# Patient Record
Sex: FEMALE | Race: BLACK OR AFRICAN AMERICAN
[De-identification: names, ages, dates, MRNs, and addresses within clinical notes are randomized per-mention and may not be internally consistent; named-entity substitution may affect disease eponyms.]

---

## 2018-03-28 ENCOUNTER — HOSPITAL ENCOUNTER (EMERGENCY)
Dept: HOSPITAL 74 - JER | Age: 58
Discharge: HOME | End: 2018-03-28
Payer: COMMERCIAL

## 2018-03-28 VITALS — TEMPERATURE: 97.5 F | HEART RATE: 76 BPM | SYSTOLIC BLOOD PRESSURE: 125 MMHG | DIASTOLIC BLOOD PRESSURE: 93 MMHG

## 2018-03-28 VITALS — BODY MASS INDEX: 19.5 KG/M2

## 2018-03-28 DIAGNOSIS — N61.1: Primary | ICD-10-CM

## 2018-03-28 DIAGNOSIS — Z86.14: ICD-10-CM

## 2018-03-28 DIAGNOSIS — G40.909: ICD-10-CM

## 2018-03-28 DIAGNOSIS — G80.9: ICD-10-CM

## 2018-03-28 PROCEDURE — 0H9UXZZ: ICD-10-PCS

## 2018-03-28 NOTE — PDOC
Rapid Medical Evaluation


Medical Evaluation: 


 Allergies











Allergy/AdvReac Type Severity Reaction Status Date / Time


 


phenytoin sodium AdvReac Mild  Verified 07/24/14 18:46





[From Dilantin]     


 


phenytoin sodium extended AdvReac Mild  Verified 07/24/14 18:46





[From Dilantin]     











03/28/18 20:06





I have performed a brief in-person evaluation of this patient.





The patient presents with a chief complaint of: L axilla abscess, currently on 

abx per aide, sent for Robert for I&D. H/o cerebral palsy, profound MR





Pertinent physical exam findings:Stable, exam deferred to ED provider





I have ordered the following:nothing





The patient will proceed to the ED for further evaluation.











**Discharge Disposition





- Diagnosis


 Abscess








- Referrals





- Patient Instructions





- Post Discharge Activity

## 2018-03-28 NOTE — PDOC
History of Present Illness





- General


Chief Complaint: Wound


Stated Complaint: ABSCESS


Time Seen by Provider: 03/28/18 20:10


History Source: Patient





- History of Present Illness


Initial Comments: 





03/28/18 20:57


57 year old female from [facility] history of MRCP, seizure 

disorder, spastic quaraplegic, history of MRSA with left breast abscess 

currently on Clindamycin PO. denies fever/ chills. patient send in for I& D of 

abscess. 





Past History





- Past Medical History


Allergies/Adverse Reactions: 


 Allergies











Allergy/AdvReac Type Severity Reaction Status Date / Time


 


phenytoin sodium AdvReac Mild  Verified 03/28/18 20:11





[From Dilantin]     


 


phenytoin sodium extended AdvReac Mild  Verified 03/28/18 20:11





[From Dilantin]     











Home Medications: 


Ambulatory Orders





Acetaminophen [Tylenol .Regular Strength -] 650 mg PO Q6H 07/24/14 


Albuterol 2.5/Ipratropium 0.5 [Duoneb -] 1 neb NEB Q4H PRN 07/24/14 


Bisacodyl [Dulcolax] 5 mg PO DAILY 07/24/14 


Calcium Carbonate/Vitamin D3 [Oyster Shell Calcium + D Tab] 1 each PO BID 07/24/ 14 


Carbamazepine Xr [Tegretol Xr -] 200 mg PO HS 07/24/14 


Carbamazepine Xr [Tegretol Xr -] 400 mg PO ACHS 07/24/14 


Chlorhexidine Gluconate [Peridex -] 12 ea PO BID 07/24/14 


Docusate Liquid [Colace Liquid -] 20 mg PO BID 07/24/14 


Guaifenesin [Mucinex -] 600 mg PO BID PRN 07/24/14 


Ibuprofen [Motrin -] 400 mg PO TID PRN 07/24/14 


Loratadine 10 mg PO DAILY 07/24/14 


Methylcellulose (with Sugar) [Citrucel Powder] 850 gm PO DAILY 07/24/14 


Pnv/Iron,Carb/Om-3/FA/Fat 1 [Multivitamin with Minerals Cap] 1 each PO DAILY 07/ 24/14 


Polyethylene Glycol 3350 [Miralax 119 gm Btl -] 17 gm PO DAILY 07/24/14 


Ammonium Lactate Lotion [Lac-Hydrin 12% Lotion -] 1 applic TP BID 07/09/16 


Docusate Liquid [Colace Liquid -] 100 mg PO BID 07/09/16 


Simethicone Liquid [Mylicon] 40 mg PO QID PRN 07/09/16 


Sodium Phosphate,Mono-Dibasic [Fleet Enema] 133 ml RC PRN PRN 07/09/16 








COPD: No


Seizures: Yes (EPILEPSY)


Other medical history: Cerebral palsy, MR, congenital quadraplegia





- Suicide/Smoking/Psychosocial Hx


Smoking Status: No


Smoking History: Never smoked


Have you smoked in the past 12 months: No


Number of Cigarettes Smoked Daily: 0


Information on smoking cessation initiated: No


Hx Alcohol Use: No


Drug/Substance Use Hx: No


Substance Use Type: None





*Physical Exam





- Vital Signs


 Last Vital Signs











Temp Pulse Resp BP Pulse Ox


 


 97.5 F L  76   20   125/93   99 


 


 03/28/18 20:12  03/28/18 20:12  03/28/18 20:12  03/28/18 20:12  03/28/18 20:12














- Physical Exam


General Appearance: Yes: Appropriately Dressed


Extremity: positive: Swelling, Other (left breast swelling proximal to left 

axilla noted to PUs fill abscess and induration. no surrounding cellulitis 

noted. )


Neurologic: positive: Fully Oriented, Alert, Normal Mood/Affect





Procedures





- Incision and Drainage


I&D Site: Left: Other (breast small incision draining copious pus drainage. )


Betadine cleansed: Yes


Anesthesia: 1% Lidocaine


Blade Size: 11


Attempts: 1





*DC/Admit/Observation/Transfer


Diagnosis at time of Disposition: 


 Abscess








- Referrals


Referrals: 


Vinod Segal Jr [Primary Care Provider] - 


Wyatt Alfaro MD [Staff Physician] - 24 hours





- Patient Instructions


Printed Discharge Instructions:  DI for Wound Infection


Additional Instructions: 


continue clindamycin





apply warm compress to the area. 








Please have her see breast surgeon as soon as possible. 








return to the ER if symptoms worsen, fever./ 





- Post Discharge Activity

## 2019-06-11 ENCOUNTER — HOSPITAL ENCOUNTER (EMERGENCY)
Dept: HOSPITAL 74 - JER | Age: 59
LOS: 1 days | Discharge: HOME | End: 2019-06-12
Payer: COMMERCIAL

## 2019-06-11 VITALS — HEART RATE: 92 BPM | SYSTOLIC BLOOD PRESSURE: 106 MMHG | DIASTOLIC BLOOD PRESSURE: 76 MMHG

## 2019-06-11 VITALS — BODY MASS INDEX: 11.1 KG/M2

## 2019-06-11 VITALS — TEMPERATURE: 99.4 F

## 2019-06-11 DIAGNOSIS — R63.8: ICD-10-CM

## 2019-06-11 DIAGNOSIS — F73: ICD-10-CM

## 2019-06-11 DIAGNOSIS — G40.909: ICD-10-CM

## 2019-06-11 DIAGNOSIS — N39.0: Primary | ICD-10-CM

## 2019-06-11 DIAGNOSIS — G80.0: ICD-10-CM

## 2019-06-11 LAB
ALBUMIN SERPL-MCNC: 3.7 G/DL (ref 3.4–5)
ALP SERPL-CCNC: 127 U/L (ref 45–117)
ALT SERPL-CCNC: 17 U/L (ref 13–61)
ANION GAP SERPL CALC-SCNC: 6 MMOL/L (ref 8–16)
APPEARANCE UR: (no result)
AST SERPL-CCNC: 24 U/L (ref 15–37)
BACTERIA #/AREA URNS HPF: 5145.6 /HPF
BASOPHILS # BLD: 0.3 % (ref 0–2)
BILIRUB SERPL-MCNC: 0.2 MG/DL (ref 0.2–1)
BILIRUB UR STRIP.AUTO-MCNC: NEGATIVE MG/DL
BUN SERPL-MCNC: 14.4 MG/DL (ref 7–18)
CALCIUM SERPL-MCNC: 9.3 MG/DL (ref 8.5–10.1)
CHLORIDE SERPL-SCNC: 109 MMOL/L (ref 98–107)
CO2 SERPL-SCNC: 32 MMOL/L (ref 21–32)
COLOR UR: (no result)
CREAT SERPL-MCNC: 0.4 MG/DL (ref 0.55–1.3)
DEPRECATED RDW RBC AUTO: 12.9 % (ref 11.6–15.6)
EOSINOPHIL # BLD: 0 % (ref 0–4.5)
EPITH CASTS URNS QL MICRO: 42.3 /HPF
GLUCOSE SERPL-MCNC: 95 MG/DL (ref 74–106)
HCT VFR BLD CALC: 47.9 % (ref 32.4–45.2)
HGB BLD-MCNC: 15.3 GM/DL (ref 10.7–15.3)
LEUKOCYTE ESTERASE UR QL STRIP.AUTO: (no result)
LYMPHOCYTES # BLD: 10.3 % (ref 8–40)
MCH RBC QN AUTO: 27.7 PG (ref 25.7–33.7)
MCHC RBC AUTO-ENTMCNC: 31.8 G/DL (ref 32–36)
MCV RBC: 87.2 FL (ref 80–96)
MONOCYTES # BLD AUTO: 9.5 % (ref 3.8–10.2)
NEUTROPHILS # BLD: 79.9 % (ref 42.8–82.8)
PH UR: 6.5 [PH] (ref 5–8)
PLATELET # BLD AUTO: 162 K/MM3 (ref 134–434)
PMV BLD: 10.9 FL (ref 7.5–11.1)
POTASSIUM SERPLBLD-SCNC: 3.6 MMOL/L (ref 3.5–5.1)
PROT SERPL-MCNC: 7.6 G/DL (ref 6.4–8.2)
PROT UR QL STRIP: 100
PROT UR QL STRIP: NEGATIVE
RBC # BLD AUTO: 5.5 M/MM3 (ref 3.6–5.2)
RBC # BLD AUTO: 70.7 /HPF (ref 0–4)
SODIUM SERPL-SCNC: 148 MMOL/L (ref 136–145)
SP GR UR: 1.03 (ref 1.01–1.03)
UROBILINOGEN UR STRIP-MCNC: 1 MG/DL (ref 0.2–1)
WBC # BLD AUTO: 6.7 K/MM3 (ref 4–10)
WBC # UR AUTO: 61.1 /HPF (ref 0–5)
YEAST #/AREA URNS HPF: (no result) /[HPF]

## 2019-06-11 PROCEDURE — 0T9B70Z DRAINAGE OF BLADDER WITH DRAINAGE DEVICE, VIA NATURAL OR ARTIFICIAL OPENING: ICD-10-PCS

## 2019-06-11 PROCEDURE — 0T9B7ZZ DRAINAGE OF BLADDER, VIA NATURAL OR ARTIFICIAL OPENING: ICD-10-PCS

## 2019-06-11 PROCEDURE — 3E02329 INTRODUCTION OF OTHER ANTI-INFECTIVE INTO MUSCLE, PERCUTANEOUS APPROACH: ICD-10-PCS

## 2019-06-11 NOTE — PDOC
Rapid Medical Evaluation


Time Seen by Provider: 06/11/19 16:21


Medical Evaluation: 


 Allergies











Allergy/AdvReac Type Severity Reaction Status Date / Time


 


phenytoin sodium AdvReac Mild  Verified 03/28/18 20:11





[From Dilantin]     


 


phenytoin sodium extended AdvReac Mild  Verified 03/28/18 20:11





[From Dilantin]     











06/11/19 16:21


I have performed a brief in-person evaluation of this patient.





The patient presents with a chief complaint of: poor PO intake x1 week





Pertinent physical exam findings: Normoactive BS. Abd SNTND.





I have ordered the following: labs, urine





The patient will proceed to the ED for further evaluation.





**Discharge Disposition





- Diagnosis


 Decreased oral intake








- Referrals





- Patient Instructions





- Post Discharge Activity

## 2019-06-11 NOTE — PDOC
History of Present Illness





- General


Chief Complaint: Loss of Appetite


Stated Complaint: LOSS OF APPETITE


Time Seen by Provider: 06/11/19 16:21





- History of Present Illness


Initial Comments: 





06/11/19 17:23


58F [facility] services history of MRCP, seizure disorder, spastic 

quaraplegic brought in for decreased PO intake for the past 3 days. 


Typically is fed using straw but seems like there is now poor effort to get 

food through the straw, has to be more "forcibly fed by hand". Sister is at 

bedside said theres is some vaginal discharge associated with foul urine odor. 





06/11/19 20:50








Past History





- Past Medical History


Allergies/Adverse Reactions: 


 Allergies











Allergy/AdvReac Type Severity Reaction Status Date / Time


 


phenytoin sodium AdvReac Mild  Verified 03/28/18 20:11





[From Dilantin]     


 


phenytoin sodium extended AdvReac Mild  Verified 03/28/18 20:11





[From Dilantin]     











Home Medications: 


Ambulatory Orders





Acetaminophen [Tylenol .Regular Strength -] 650 mg PO Q6H PRN 07/24/14 


Albuterol 2.5/Ipratropium 0.5 [Duoneb -] 1 neb NEB Q4H PRN 07/24/14 


Bisacodyl [Dulcolax] 5 mg PO DAILY 07/24/14 


Carbamazepine Xr [Tegretol Xr -] 400 mg PO ACHS 07/24/14 


Chlorhexidine Gluconate [Peridex -] 1 tsp PO BID 07/24/14 


Guaifenesin [Mucinex -] 600 mg PO BID PRN 07/24/14 


Ibuprofen [Motrin -] 400 mg PO TID PRN 07/24/14 


Loratadine 10 mg PO DAILY 07/24/14 


Methylcellulose (with Sugar) [Citrucel Powder] 1 tsp PO DAILY 07/24/14 


Pnv/Iron,Carb/Om-3/FA/Fat 1 [Multivitamin with Minerals Cap] 1 each PO DAILY 07/ 24/14 


Polyethylene Glycol 3350 [Miralax 119 gm Btl -] 17 gm PO DAILY 07/24/14 


Ammonium Lactate Lotion [Lac-Hydrin 12% Lotion -] 1 applic TP BID 07/09/16 


Docusate Liquid [Colace Liquid -] 20 mg PO BID 07/09/16 


Simethicone Liquid [Mylicon] 40 mg PO QID PRN 07/09/16 


Sodium Phosphate,Mono-Dibasic [Fleet Enema] 133 ml RC PRN PRN 07/09/16 


Calcium Carbonate/Vitamin D3 [Oystercal-D 500 mg-400 Unit Tb] 1 each PO BID 06/ 11/19 


Ergocalciferol [Vitamin D2] 50,000 unit PO Q7D@1000 06/11/19 








COPD: No


Seizures: Yes (EPILEPSY,Astigmatism,Scoliosis)


Other medical history: profound intellectual disabilities,cerebral palsy w/

spastic quadriparesis,





- Immunization History


Immunization Up to Date: No





- Suicide/Smoking/Psychosocial Hx


Smoking Status: No


Smoking History: Never smoked


Have you smoked in the past 12 months: No


Number of Cigarettes Smoked Daily: 0


Information on smoking cessation initiated: No


Hx Alcohol Use: No


Drug/Substance Use Hx: No


Substance Use Type: None





**Review of Systems





- Review of Systems


Able to Perform ROS?: No (non-verbal)





*Physical Exam





- Vital Signs


 Last Vital Signs











Temp Pulse Resp BP Pulse Ox


 


 99.4 F   118 H  16   109/87   94 L


 


 06/11/19 16:21  06/11/19 16:21  06/11/19 16:21  06/11/19 16:21  06/11/19 16:21














- Physical Exam


General Appearance: Yes: Other (Patient noon-verbal, contracted.)


HEENT: positive: EOMI, RANDAL, Normal ENT Inspection


Respiratory/Chest: positive: Lungs Clear, Normal Breath Sounds.  negative: 

Chest Tender, Respiratory Distress


Cardiovascular: positive: Regular Rhythm, S1, S2, Tachycardia


Female Pelvic Exam: positive: other (some yeast like discharge )


Gastrointestinal/Abdominal: positive: Normal Bowel Sounds, Flat, Soft.  negative

: Tender


Integumentary: positive: Normal Color, Dry, Warm


Neurologic: positive: Other (At baseline)





ED Treatment Course





- LABORATORY


CBC & Chemistry Diagram: 


 06/11/19 19:00





 06/11/19 19:00





Medical Decision Making





- Medical Decision Making





06/11/19 20:52


Cheko get septic workup, investigate probable UTI., chest xray and basic labs. 





Difficulty getting urine through straight cath due to difficult anatomy. Second 

try successful with olivares. 


Labs pending. 





Conversation with family as obtaining a IV line is difficult. Will get all labs 

then reassess necessity for IV line. 


06/11/19 22:03


Patient signed out to Dr. Carmichael





*DC/Admit/Observation/Transfer


Diagnosis at time of Disposition: 


 Decreased oral intake, UTI (urinary tract infection)








- Discharge Dispostion


Condition at time of disposition: Stable





- Referrals





- Patient Instructions





- Post Discharge Activity

## 2019-06-11 NOTE — PDOC
Documentation entered by Hamida Nielson SCRIBE, acting as scribe for Linh West DO.








Linh West DO:  This documentation has been prepared by the 

Nisreen michaels Brenda, SCRIBE, under my direction and personally reviewed by me 

in its entirety.  I confirm that the documentation accurately reflects all work

, treatment, procedures, and medical decision making performed by me.  





Attending Attestation





- Resident


Resident Name: Rahul De La Paz





- ED Attending Attestation


I have performed the following: I have examined & evaluated the patient, The 

case was reviewed & discussed with the resident, I agree w/resident's findings 

& plan





- HPI


HPI: 





06/11/19 19:32





The patient is a 58 year old female from [facility] via EMS 

with a significant past medical history of MRCP, seizure disorder, spastic 

quadriplegic secondary to cerebral palsy who presents to the Emergency 

Department with 3 days of decreased PO intake and urine abnormalities. As per 

aide, she is usually fed through a straw at baseline, but has been having a 

poor effort getting food through the straw. The aide additionally notes her 

urine has been foul smelling with white purulence passing through when 

urinating. Patient is nonverbal at baseline secondary to CP since childhood. 





Allergies: phenytoin sodium


Past surgical history: Not reported


Social history: Not reported








- Physicial Exam


PE: 





06/11/19 19:33





Agrees with the residents physical exam. 











- Medical Decision Making





06/11/19 23:30


58-year-old female sent for decreased by mouth intake and possible urinary 

tract infection


Catheterized urine is consistent with UTI at


Patient's sister is at the bedside, she acts as her health care surrogate as 

patient has severe cerebral palsy with limited cognitive ability


They prefer the patient goes back to her facility on oral antibiotics, in 

regards to her decreased by mouth intake and swallowing difficulties they would 

like to repeat a swallow study on an outpatient basis


They are aware that forcing feeds will increase chances of aspiration


Rocephin 250 mg given IM, patient will be discharged on Keflex

## 2019-06-12 NOTE — EKG
Test Reason : 

Blood Pressure : ***/*** mmHG

Vent. Rate : 132 BPM     Atrial Rate : 132 BPM

   P-R Int : 124 ms          QRS Dur : 060 ms

    QT Int : 288 ms       P-R-T Axes : 096 074 090 degrees

   QTc Int : 426 ms

 

SINUS TACHYCARDIA

SEPTAL INFARCT , AGE UNDETERMINED

ABNORMAL ECG

NO PREVIOUS ECGS AVAILABLE

Confirmed by MER PERALTA MD ([id number]) on 6/12/2019 3:29:45 PM

 

Referred By:             Confirmed By:MER PERALTA MD

## 2019-07-03 ENCOUNTER — HOSPITAL ENCOUNTER (EMERGENCY)
Dept: HOSPITAL 74 - FER | Age: 59
Discharge: HOME | End: 2019-07-03
Payer: COMMERCIAL

## 2019-07-03 VITALS — DIASTOLIC BLOOD PRESSURE: 93 MMHG | TEMPERATURE: 99 F | SYSTOLIC BLOOD PRESSURE: 138 MMHG | HEART RATE: 82 BPM

## 2019-07-03 VITALS — BODY MASS INDEX: 11.1 KG/M2

## 2019-07-03 DIAGNOSIS — R63.0: Primary | ICD-10-CM

## 2019-07-03 DIAGNOSIS — G40.909: ICD-10-CM

## 2019-07-03 DIAGNOSIS — G80.9: ICD-10-CM

## 2019-07-03 LAB
ALBUMIN SERPL-MCNC: 3.4 G/DL (ref 3.4–5)
ALP SERPL-CCNC: 98 U/L (ref 45–117)
ALT SERPL-CCNC: 9 U/L (ref 13–61)
ANION GAP SERPL CALC-SCNC: 7 MMOL/L (ref 8–16)
AST SERPL-CCNC: 16 U/L (ref 15–37)
BASOPHILS # BLD: 0.3 % (ref 0–2)
BILIRUB SERPL-MCNC: 0.3 MG/DL (ref 0.2–1)
BUN SERPL-MCNC: 14 MG/DL (ref 7–18)
CALCIUM SERPL-MCNC: 9.3 MG/DL (ref 8.5–10)
CHLORIDE SERPL-SCNC: 106 MMOL/L (ref 98–107)
CO2 SERPL-SCNC: 32 MMOL/L (ref 21–32)
CREAT SERPL-MCNC: < 0.6 MG/DL (ref 0.55–1.3)
DEPRECATED RDW RBC AUTO: 11.9 % (ref 11.6–15.6)
EOSINOPHIL # BLD: 0 % (ref 0–4.5)
GLUCOSE SERPL-MCNC: 119 MG/DL (ref 74–106)
HCT VFR BLD CALC: 39 % (ref 32.4–45.2)
HGB BLD-MCNC: 12.4 GM/DL (ref 10.7–15.3)
LYMPHOCYTES # BLD: 27.4 % (ref 8–40)
MCH RBC QN AUTO: 27.5 PG (ref 25.7–33.7)
MCHC RBC AUTO-ENTMCNC: 31.7 G/DL (ref 32–36)
MCV RBC: 86.6 FL (ref 80–96)
MONOCYTES # BLD AUTO: 8.8 % (ref 3.8–10.2)
NEUTROPHILS # BLD: 63.5 % (ref 42.8–82.8)
PLATELET # BLD AUTO: 227 K/MM3 (ref 134–434)
PMV BLD: 9 FL (ref 7.5–11.1)
POTASSIUM SERPLBLD-SCNC: 3.2 MMOL/L (ref 3.5–5.1)
PROT SERPL-MCNC: 6.2 G/DL (ref 6.4–8.2)
RBC # BLD AUTO: 4.51 M/MM3 (ref 3.6–5.2)
SODIUM SERPL-SCNC: 145 MMOL/L (ref 136–145)
WBC # BLD AUTO: 6 K/MM3 (ref 4–10.8)

## 2019-07-03 NOTE — PDOC
Documentation entered by Coleen Adams SCRIBE, acting as scribe for 

Sanaz Gordon MD.








Sanaz Gordon MD:  This documentation has been prepared by the 

scribe, Coleen Adams SCRIBE, under my direction and personally reviewed by me 

in its entirety.  I confirm that the documentation accurately reflects all work

, treatment, procedures, and medical decision making performed by me.  





History of Present Illness





- General


Chief Complaint: Urinary Problem


Stated Complaint: DIFFICULTY URINATING,DISCHARGE


Time Seen by Provider: 07/03/19 20:01


History Source: Care Provider


Exam Limitations: Clinical Condition





- History of Present Illness


Initial Comments: 





07/03/19 20:06


The patient is a 58-year-old from [facility], with a past 

medical history of MR - nonverbal, epilepsy, and scoliosis, who presents to the 

ED for decreased PO intake. Sister and caregiver are at bedside and report that 

the patient has been experiencing decreased appetite today. As a result, the 

patient has had decreased urine output. Urine is dark in color. LBM was 

yesterday. Otherwise, the patient is behaving at her baseline. HPI is limited 

due to the patients clinical condition. 








PAST MEDICAL HISTORY:  MR -nonverbal, epilepsy, scoliosis.





PAST SURGICAL HISTORY:  no significant history





FAMILY HISTORY:  no pertinent history 





HISTORY:  Pt lives at the [facility]. 





MEDICATIONS:  reviewed





ALLERGIES:  As per nursing notes








General:  (+)Loss of appetite. No fevers or chills, no weakness, no weight loss 


HEENT: No change in vision.  No sore throat,. No ear pain


CardioVascular:  No chest pain or shortness of breath


Respiratory:No cough, or wheezing. 


Gastrointestinal:  no nausea, vomiting, diarrhea or constipation,  No rectal 

bleeding


Genitourinary: (+)Decreased urine output. No dysuria, hematuria, or frequency


Musculoskeletal:  No joint or muscle pain or swelling


Neurologic: No headache, vertigo, dizziness or loss of consciousness


Psychiatric: nor depression 


Skin: No rashes or easy bruising


Endocrine: no increased thirst or abnormal weight change


Allergic: no skin or latex allergy


All other systems reviewed and normal








General: (+)Alert but nonverbal, no acute distress. Very small for her age with 

contractures as a result of having spent her life in bed or a wheelchair. 


HEENT: Throat: Normal, tonsils normal, no erythema or exudate


Neck: Supple, no meningeal signs, no lymphadenopathy


Eyes::Pupils equal reactive and round, extraocular motion intact


Chest: Nontender to palpation 


Cardiac: S1-S2 normal, regular rate and rhythm, no murmurs rubs or gallops


Respiratory: Lungs clear to auscultation bilateral


Abdomen: Soft, nondistended, normal bowel sounds, nontender to palpation 

diffusely


Extremities: Warm, dry, no cyanosis, clubbing, or edema


Skin: (+)Large well-healed midline scar that extends from the sternum to the 

pubic bone. No rashes


Neuro: (+)Alert, but nonverbal. 





07/03/19 20:58


On Reevaluation, patient did have 1 wet diaper in the ED. 


07/03/19 21:40


Patient also is drinking fluids in the ED.





Patient's white count was normal and her labs were normal


This 14 creatinine was 0.6. Her potassium was a little bit low at 3.2. 

Otherwise patient is at her baseline and will be discharged back to her group 

home





Past History





- Past Medical History


Allergies/Adverse Reactions: 


 Allergies











Allergy/AdvReac Type Severity Reaction Status Date / Time


 


phenytoin sodium AdvReac Mild  Verified 03/28/18 20:11





[From Dilantin]     


 


phenytoin sodium extended AdvReac Mild  Verified 03/28/18 20:11





[From Dilantin]     











Home Medications: 


Ambulatory Orders





Acetaminophen [Tylenol .Regular Strength -] 650 mg PO Q6H PRN 07/24/14 


Albuterol 2.5/Ipratropium 0.5 [Duoneb -] 1 neb NEB Q4H PRN 07/24/14 


Bisacodyl [Dulcolax] 5 mg PO DAILY 07/24/14 


Carbamazepine Xr [Tegretol Xr -] 400 mg PO ACHS 07/24/14 


Chlorhexidine Gluconate [Peridex -] 1 tsp PO BID 07/24/14 


Guaifenesin [Mucinex -] 600 mg PO BID PRN 07/24/14 


Ibuprofen [Motrin -] 400 mg PO TID PRN 07/24/14 


Loratadine 10 mg PO DAILY 07/24/14 


Methylcellulose (with Sugar) [Citrucel Powder] 1 tsp PO DAILY 07/24/14 


Pnv/Iron,Carb/Om-3/FA/Fat 1 [Multivitamin with Minerals Cap] 1 each PO DAILY 07/ 24/14 


Polyethylene Glycol 3350 [Miralax 119 gm Btl -] 17 gm PO DAILY 07/24/14 


Ammonium Lactate Lotion [Lac-Hydrin 12% Lotion -] 1 applic TP BID 07/09/16 


Docusate Liquid [Colace Liquid -] 20 mg PO BID 07/09/16 


Simethicone Liquid [Mylicon] 40 mg PO QID PRN 07/09/16 


Sodium Phosphate,Mono-Dibasic [Fleet Enema] 133 ml RC PRN PRN 07/09/16 


Calcium Carbonate/Vitamin D3 [Oystercal-D 500 mg-400 Unit Tb] 1 each PO BID 06/ 11/19 


Cephalexin Monohydrate [Keflex -] 500 mg PO Q8H #21 capsule 06/11/19 


Cephalexin Monohydrate [Keflex -] 500 mg PO Q8H 7 Days #21 capsule 06/11/19 


Ergocalciferol [Vitamin D2] 50,000 unit PO Q7D@1000 06/11/19 








COPD: No


Seizures: Yes (EPILEPSY,Astigmatism,Scoliosis)





- Immunization History


Immunization Up to Date: No





- Suicide/Smoking/Psychosocial Hx


Smoking Status: No


Smoking History: Never smoked


Have you smoked in the past 12 months: No


Number of Cigarettes Smoked Daily: 0


Hx Alcohol Use: No


Drug/Substance Use Hx: No


Substance Use Type: None





*Physical Exam





- Vital Signs


 Last Vital Signs











Temp Pulse Resp BP Pulse Ox


 


 99 F   82   18   138/93    


 


 07/03/19 20:05  07/03/19 20:05  07/03/19 20:05  07/03/19 20:05   














ED Treatment Course





- LABORATORY


CBC & Chemistry Diagram: 


 07/03/19 20:50





 07/03/19 20:50





- ADDITIONAL ORDERS


Additional order review: 


 Laboratory  Results











  07/03/19





  20:50


 


Sodium  145


 


Potassium  3.2 L


 


Chloride  106


 


Carbon Dioxide  32


 


Anion Gap  7 L


 


BUN  14.0


 


Creatinine  < 0.6


 


Est GFR (CKD-EPI)AfAm  114.82


 


Est GFR (CKD-EPI)NonAf  99.07


 


Random Glucose  119 H


 


Calcium  9.3


 


Total Bilirubin  0.3


 


AST  16


 


ALT  9 L


 


Alkaline Phosphatase  98


 


Total Protein  6.2 L


 


Albumin  3.4








 











  07/03/19





  20:50


 


RBC  4.51


 


MCV  86.6


 


MCHC  31.7 L


 


RDW  11.9


 


MPV  9.0


 


Neutrophils %  63.5


 


Lymphocytes %  27.4


 


Monocytes %  8.8


 


Eosinophils %  0.0


 


Basophils %  0.3














- RADIOLOGY


Radiology Studies Ordered: 














 Category Date Time Status


 


 CHEST X-RAY PORTABLE* [RAD] Stat Radiology  07/03/19 20:49 Taken














*DC/Admit/Observation/Transfer


Diagnosis at time of Disposition: 


 Decrease in appetite, Cerebral palsy








- Discharge Dispostion


Disposition: HOME


Condition at time of disposition: Stable


Decision to Admit order: No





- Referrals





- Patient Instructions


Additional Instructions: 


Return to the emergency department immediately with ANY new, persistent or 

worsening symptoms.





Continue any medications as previously prescribed by your physician.





You should follow up with your primary doctor as soon as possible regarding 

today's emergency department visit.


.


Please make sure your doctor reviews the results of your emergency evaluation.





Thank you for coming to the   Emergency Department today for your care. It was 

a pleasure to see you today. Please note that your evaluation is INCOMPLETE 

until you  follow-up with your doctor. 





- Post Discharge Activity

## 2019-08-22 ENCOUNTER — HOSPITAL ENCOUNTER (INPATIENT)
Dept: HOSPITAL 74 - FER | Age: 59
LOS: 21 days | DRG: 388 | End: 2019-09-12
Attending: NURSE PRACTITIONER | Admitting: INTERNAL MEDICINE
Payer: COMMERCIAL

## 2019-08-22 VITALS — BODY MASS INDEX: 12.1 KG/M2

## 2019-08-22 DIAGNOSIS — E87.0: ICD-10-CM

## 2019-08-22 DIAGNOSIS — J98.4: ICD-10-CM

## 2019-08-22 DIAGNOSIS — A41.89: ICD-10-CM

## 2019-08-22 DIAGNOSIS — J90: ICD-10-CM

## 2019-08-22 DIAGNOSIS — F73: ICD-10-CM

## 2019-08-22 DIAGNOSIS — E87.1: ICD-10-CM

## 2019-08-22 DIAGNOSIS — G80.0: ICD-10-CM

## 2019-08-22 DIAGNOSIS — G80.8: ICD-10-CM

## 2019-08-22 DIAGNOSIS — N17.9: ICD-10-CM

## 2019-08-22 DIAGNOSIS — D50.9: ICD-10-CM

## 2019-08-22 DIAGNOSIS — M41.9: ICD-10-CM

## 2019-08-22 DIAGNOSIS — B35.6: ICD-10-CM

## 2019-08-22 DIAGNOSIS — R06.03: ICD-10-CM

## 2019-08-22 DIAGNOSIS — R00.0: ICD-10-CM

## 2019-08-22 DIAGNOSIS — K21.9: ICD-10-CM

## 2019-08-22 DIAGNOSIS — I46.9: ICD-10-CM

## 2019-08-22 DIAGNOSIS — J45.909: ICD-10-CM

## 2019-08-22 DIAGNOSIS — E83.39: ICD-10-CM

## 2019-08-22 DIAGNOSIS — G40.909: ICD-10-CM

## 2019-08-22 DIAGNOSIS — E87.6: ICD-10-CM

## 2019-08-22 DIAGNOSIS — K56.609: Primary | ICD-10-CM

## 2019-08-22 DIAGNOSIS — Z74.01: ICD-10-CM

## 2019-08-22 DIAGNOSIS — J98.11: ICD-10-CM

## 2019-08-22 DIAGNOSIS — Z53.09: ICD-10-CM

## 2019-08-22 DIAGNOSIS — R64: ICD-10-CM

## 2019-08-22 DIAGNOSIS — E87.5: ICD-10-CM

## 2019-08-22 LAB
ALBUMIN SERPL-MCNC: 4 G/DL (ref 3.4–5)
ALP SERPL-CCNC: 168 U/L (ref 45–117)
ALT SERPL-CCNC: 16 U/L (ref 13–61)
ANION GAP SERPL CALC-SCNC: 15 MMOL/L (ref 8–16)
AST SERPL-CCNC: 30 U/L (ref 15–37)
BILIRUB SERPL-MCNC: 0.7 MG/DL (ref 0.2–1)
BUN SERPL-MCNC: 15 MG/DL (ref 7–18)
CALCIUM SERPL-MCNC: 10 MG/DL (ref 8.5–10)
CHLORIDE SERPL-SCNC: 96 MMOL/L (ref 98–107)
CO2 SERPL-SCNC: 32 MMOL/L (ref 21–32)
CREAT SERPL-MCNC: 0.5 MG/DL (ref 0.55–1.3)
DEPRECATED RDW RBC AUTO: 14 % (ref 11.6–15.6)
GLUCOSE SERPL-MCNC: 145 MG/DL (ref 74–106)
HCT VFR BLD CALC: 50 % (ref 32.4–45.2)
HGB BLD-MCNC: 16.5 GM/DL (ref 10.7–15.3)
INR BLD: 1 (ref 0.82–1.09)
MCH RBC QN AUTO: 28.9 PG (ref 25.7–33.7)
MCHC RBC AUTO-ENTMCNC: 33.1 G/DL (ref 32–36)
MCV RBC: 87.5 FL (ref 80–96)
PLATELET # BLD AUTO: 314 K/MM3 (ref 134–434)
PLATELET BLD QL SMEAR: ADEQUATE
PMV BLD: 9.5 FL (ref 7.5–11.1)
POTASSIUM SERPLBLD-SCNC: 3.4 MMOL/L (ref 3.5–5.1)
PROT SERPL-MCNC: 8.1 G/DL (ref 6.4–8.2)
PT PNL PPP: 11.2 SEC (ref 10.2–13)
RBC # BLD AUTO: 5.71 M/MM3 (ref 3.6–5.2)
SODIUM SERPL-SCNC: 143 MMOL/L (ref 136–145)
WBC # BLD AUTO: 16 K/MM3 (ref 4–10.8)

## 2019-08-22 PROCEDURE — C1751 CATH, INF, PER/CENT/MIDLINE: HCPCS

## 2019-08-22 PROCEDURE — P9058 RBC, L/R, CMV-NEG, IRRAD: HCPCS

## 2019-08-22 PROCEDURE — P9038 RBC IRRADIATED: HCPCS

## 2019-08-22 RX ADMIN — PIPERACILLIN SODIUM,TAZOBACTAM SODIUM SCH MLS/HR: 3; .375 INJECTION, POWDER, FOR SOLUTION INTRAVENOUS at 21:40

## 2019-08-22 RX ADMIN — LEVETIRACETAM SCH MG: 100 INJECTION, SOLUTION, CONCENTRATE INTRAVENOUS at 23:44

## 2019-08-22 RX ADMIN — DEXTROSE MONOHYDRATE, SODIUM CHLORIDE, AND POTASSIUM CHLORIDE SCH MLS/HR: 50; 9; 2.98 INJECTION, SOLUTION INTRAVENOUS at 21:39

## 2019-08-22 NOTE — HP
CHIEF COMPLAINT: Appears uncomfortable, crying (non-verbal)





PCP: Dr. Vinod Segal, [facility]





HISTORY OF PRESENT ILLNESS:


59 year-old female resident of [facility], with a PMH significant for 

profound mental retardation, cerebral palsy, congenital quadriplegia, severe 

scoliosis, seizure disorder, and GERD. Aides have observed patient becoming 

progressively constipated after starting famotidine for GERD. Last normal BM 

was 3 days ago. Today patient began to appear very uncomfortable and has been 

crying. She was given a suppository and a fleets enema this morning, but only 

passed 2 hard priyanka of stool. Patient ate breakfast this morning as usual. 

Has not had any vomiting. No fever. No observed sweats, chills. 





ER course was notable for:


(1) WBC 16.0k, p130


(2) Xray: developing SBO


(3) K 3.4





Recent Travel:


No





PAST MEDICAL HISTORY:


Profound mental retardation


Cerebral palsy


Congenital quadriplegia


Severe scoliosis


Seizure disorder


GERD





PAST SURGICAL HISTORY:


Abdominal surgery as an infant





Social History: resides at [facility]


Smoking: no


Alcohol: no


Drugs: no





Family History: reviewed, non-contributory





Allergies


phenytoin sodium [From Dilantin] Adverse Reaction (Mild, Verified 08/22/19 18:36

)


 PER [facility] RECORDS, PT HAS DILANTIN SENSITIVITY 


phenytoin sodium extended [From Dilantin] Adverse Reaction (Mild, Verified 08/22 /19 18:36)


 


 PER [facility] RECORDS, PT HAS DILANTIN SENSITIVITY 


sellfish Allergy (Uncoded 08/22/19 18:36)


 








 Home Medications











 Medication  Instructions  Recorded


 


Carbamazepine Xr [Tegretol Xr -] 20 mg PO BID 07/24/14


 


Polyethylene Glycol 3350 [Miralax 17 gm PO DAILY 07/24/14





119 gm Btl -]  


 


Calcium Carbonate/Vitamin D3 1 each PO BID 08/22/19





[Oyster Shell 500-Vit D3 200 Tb]  


 


Famotidine [Pepcid] 40 mg PO BID 08/22/19


 


Fexofenadine HCl [Allegra Allergy] 30 mg PO BID 08/22/19


 


Pedi Multivit 158/Iron/Vit K1 1 each PO DAILY 08/22/19





[Cerovite Jr]  


 


Simethicone 40 mg PO Q4H 08/22/19


 


Wheat Dextrin [Benefiber] 1 each PO DAILY 08/22/19








REVIEW OF SYSTEMS: unable to obtain, patient non-verbal








PHYSICAL EXAMINATION


 Vital Signs - 24 hr











  08/22/19 08/22/19





  13:23 17:29


 


Temperature 98.2 F 


 


Pulse Rate 130 H 


 


Pulse Rate [  124 H





Right]  


 


Respiratory 18 19





Rate  


 


Blood Pressure 98/62 


 


Blood Pressure  108/81





[Left Arm]  


 


O2 Sat by Pulse 99 100





Oximetry (%)  











GENERAL: Awake. Grimacing. 


EYES: Pupils equal, round and reactive to light, sclera anicteric, conjunctiva 

clear. No lid lag.


LUNGS: Breath sounds CTA


HEART: Regular rate and rhythm, S1 and S2


ABDOMEN: Soft, well-healed surgical scar, tenderness over the RLQ, active bowel 

sounds


MUSCULOSKELETAL: contractures all-four extremities


UPPER EXTREMITIES: 2+ pulses, warm, well-perfused. No peripheral edema.


LOWER EXTREMITIES: 2+ pulses, warm, well-perfused. No calf tenderness. No 

peripheral edema. 


SKIN: Warm, dry, normal turgor, no rashes or lesions noted, normal capillary 

refill. 


 Laboratory Results - last 24 hr











  08/22/19 08/22/19 08/22/19





  17:22 17:22 17:22


 


WBC   16.0 H 


 


RBC   5.71 H 


 


Hgb   16.5 H 


 


Hct   50.0 H D 


 


MCV   87.5 


 


MCH   28.9 


 


MCHC   33.1 


 


RDW   14.0  D 


 


Plt Count   314 


 


MPV   9.5 


 


Neutrophils %   No Result Required. 


 


Lymphocytes %   No Result Required. 


 


PT with INR   


 


INR   


 


PTT (Actin FS)  32.9  


 


Sodium    143


 


Potassium    3.4 L


 


Chloride    96 L


 


Carbon Dioxide    32


 


Anion Gap    15


 


BUN    15.0


 


Creatinine    0.5 L


 


Est GFR (CKD-EPI)AfAm    121.07


 


Est GFR (CKD-EPI)NonAf    104.46


 


Random Glucose    145 H


 


Calcium    10.0


 


Total Bilirubin    0.7


 


AST    30


 


ALT    16


 


Alkaline Phosphatase    168 H


 


Total Protein    8.1


 


Albumin    4.0














  08/22/19





  17:22


 


WBC 


 


RBC 


 


Hgb 


 


Hct 


 


MCV 


 


MCH 


 


MCHC 


 


RDW 


 


Plt Count 


 


MPV 


 


Neutrophils % 


 


Lymphocytes % 


 


PT with INR  11.2


 


INR  1.00


 


PTT (Actin FS) 


 


Sodium 


 


Potassium 


 


Chloride 


 


Carbon Dioxide 


 


Anion Gap 


 


BUN 


 


Creatinine 


 


Est GFR (CKD-EPI)AfAm 


 


Est GFR (CKD-EPI)NonAf 


 


Random Glucose 


 


Calcium 


 


Total Bilirubin 


 


AST 


 


ALT 


 


Alkaline Phosphatase 


 


Total Protein 


 


Albumin 











ASSESSMENT/PLAN


59 year-old female resident of [facility], with a PMH significant for 

profound mental retardation, cerebral palsy, congenital quadriplegia, severe 

scoliosis, seizure disorder, and GERD. Admitted for SBO.





SBO


   --seen on xray; cannot do CT due to contractures, movement


   --repeat abdominal xray in am


   --afebrile, elevated WBC


   --no vomiting


   --start Zosyn


   --NPO


   --IV fluids


   --morphine PRN for pain





Profound mental retardation


Cerebral palsy


Congenital quadriplegia


Severe scoliosis


   --dependent for all ADLs





Seizure disorder


   --on PO carbamazepine at [place]


   --Keppra IV 500mg BID while NPO





GERD


   --hold protonix





Hypokalemia


   --replete





FEN


   Fluids: D5NS+40K@75mL/hr


   Electrolytes: replete as indicated


   Nutrition: NPO





DVT prophylaxis: no chemical prophylaxis due to possible surgical intervention





Dispo: continues to require inpatient care.











Visit type





- Emergency Visit


Emergency Visit: Yes


ED Registration Date: 08/22/19


Care time: The patient presented to the Emergency Department on the above date 

and was hospitalized for further evaluation of their emergent condition.





- New Patient


This patient is new to me today: Yes


Date on this admission: 08/23/19





- Critical Care


Critical Care patient: No

## 2019-08-22 NOTE — PDOC
History of Present Illness





- General


Chief Complaint: Constipation


Stated Complaint: constipation


Time Seen by Provider: 08/22/19 13:35





Past History





- Past Medical History


Allergies/Adverse Reactions: 


 Allergies











Allergy/AdvReac Type Severity Reaction Status Date / Time


 


phenytoin sodium AdvReac Mild  Verified 08/22/19 13:24





[From Dilantin]     


 


phenytoin sodium extended AdvReac Mild  Verified 08/22/19 13:24





[From Dilantin]     


 


sellfish Allergy   Uncoded 08/22/19 13:24











Home Medications: 


Ambulatory Orders





Acetaminophen [Tylenol .Regular Strength -] 650 mg PO Q6H PRN 07/24/14 


Albuterol 2.5/Ipratropium 0.5 [Duoneb -] 1 neb NEB Q4H PRN 07/24/14 


Bisacodyl [Dulcolax] 5 mg PO DAILY 07/24/14 


Carbamazepine Xr [Tegretol Xr -] 400 mg PO ACHS 07/24/14 


Chlorhexidine Gluconate [Peridex -] 1 tsp PO BID 07/24/14 


Guaifenesin [Mucinex -] 600 mg PO BID PRN 07/24/14 


Ibuprofen [Motrin -] 400 mg PO TID PRN 07/24/14 


Loratadine 10 mg PO DAILY 07/24/14 


Methylcellulose (with Sugar) [Citrucel Powder] 1 tsp PO DAILY 07/24/14 


Pnv/Iron,Carb/Om-3/FA/Fat 1 [Multivitamin with Minerals Cap] 1 each PO DAILY 07/ 24/14 


Polyethylene Glycol 3350 [Miralax 119 gm Btl -] 17 gm PO DAILY 07/24/14 


Ammonium Lactate Lotion [Lac-Hydrin 12% Lotion -] 1 applic TP BID 07/09/16 


Docusate Liquid [Colace Liquid -] 20 mg PO BID 07/09/16 


Simethicone Liquid [Mylicon] 40 mg PO QID PRN 07/09/16 


Sodium Phosphate,Mono-Dibasic [Fleet Enema] 133 ml RC PRN PRN 07/09/16 


Calcium Carbonate/Vitamin D3 [Oystercal-D 500 mg-400 Unit Tb] 1 each PO BID 06/ 11/19 


Cephalexin Monohydrate [Keflex -] 500 mg PO Q8H #21 capsule 06/11/19 


Cephalexin Monohydrate [Keflex -] 500 mg PO Q8H 7 Days #21 capsule 06/11/19 


Ergocalciferol [Vitamin D2] 50,000 unit PO Q7D@1000 06/11/19 








COPD: No


Seizures: Yes (EPILEPSY,Astigmatism,Scoliosis)





- Immunization History


Immunization Up to Date: No





- Suicide/Smoking/Psychosocial Hx


Smoking Status: No


Smoking History: Never smoked


Have you smoked in the past 12 months: No


Number of Cigarettes Smoked Daily: 0


Hx Alcohol Use: No


Drug/Substance Use Hx: No


Substance Use Type: None





*Physical Exam





- Vital Signs


 Last Vital Signs











Temp Pulse Resp BP Pulse Ox


 


 98.2 F   130 H  18   98/62   99 


 


 08/22/19 13:23  08/22/19 13:23  08/22/19 13:23  08/22/19 13:23  08/22/19 13:23














*DC/Admit/Observation/Transfer





- Discharge Dispostion


Condition at time of disposition: Stable





- Referrals





- Patient Instructions





- Post Discharge Activity

## 2019-08-22 NOTE — PDOC
History of Present Illness





- General


Chief Complaint: Constipation


Stated Complaint: constipation


Time Seen by Provider: 08/22/19 13:35





- History of Present Illness


Initial Comments: 





08/22/19 14:00


59 F with h/o MR - nonverbal, epilepsy, GERD, and scoliosis, presenting to ED 

for constipation. Pt was recently diagnosed with GERD and started on 

famotidine. Since then, pt has been getting progressively more constipated. Per 

aide, pt's last normal BM was 3 days ago. Today, pt began to appear very 

uncomfortable and has been crying. Pt was given a suppository as well as a 

fleets enema this morning, but only passed 2 hard priyanka of stool. Aide states 

that pt ate breakfast this morning as usual. Has not had any vomiting.


Pt unable to contribute additional history due to being nonverbal.





Past History





- Past Medical History


Allergies/Adverse Reactions: 


 Allergies











Allergy/AdvReac Type Severity Reaction Status Date / Time


 


phenytoin sodium AdvReac Mild  Verified 08/22/19 13:24





[From Dilantin]     


 


phenytoin sodium extended AdvReac Mild  Verified 08/22/19 13:24





[From Dilantin]     


 


sellfish Allergy   Uncoded 08/22/19 13:24











Home Medications: 


Ambulatory Orders





Carbamazepine Xr [Tegretol Xr -] 20 mg PO BID 07/24/14 


Polyethylene Glycol 3350 [Miralax 119 gm Btl -] 17 gm PO DAILY 07/24/14 


Calcium Carbonate/Vitamin D3 [Oyster Shell 500-Vit D3 200 Tb] 1 each PO BID 08/ 22/19 


Famotidine [Pepcid] 40 mg PO BID 08/22/19 


Fexofenadine HCl [Allegra Allergy] 30 mg PO BID 08/22/19 


Pedi Multivit 158/Iron/Vit K1 [Cerovite Jr] 1 each PO DAILY 08/22/19 


Simethicone 40 mg PO Q4H 08/22/19 


Wheat Dextrin [Benefiber] 1 each PO DAILY 08/22/19 








COPD: No


Seizures: Yes (EPILEPSY,Astigmatism,Scoliosis)





- Immunization History


Immunization Up to Date: No





- Suicide/Smoking/Psychosocial Hx


Smoking Status: No


Smoking History: Never smoked


Have you smoked in the past 12 months: No


Number of Cigarettes Smoked Daily: 0


Hx Alcohol Use: No


Drug/Substance Use Hx: No


Substance Use Type: None





**Review of Systems





- Review of Systems


Able to Perform ROS?: No





*Physical Exam





- Vital Signs


 Last Vital Signs











Temp Pulse Resp BP Pulse Ox


 


 98.2 F   130 H  18   98/62   99 


 


 08/22/19 13:23  08/22/19 13:23  08/22/19 13:23  08/22/19 13:23  08/22/19 13:23














- Physical Exam


Comments: 





08/22/19 14:03


GENERAL: Awake, in no acute distress.


HEAD: No signs of trauma


EYES: PERRLA, EOMI, sclera anicteric, conjunctiva clear


ENT: Auricles normal inspection, hearing grossly normal, nares patent, 

oropharynx clear without exudates. Moist mucosa


NECK: Nontender, no stepoffs, Normal ROM, supple, no lymphadenopathy, JVD, or 

masses


LUNGS: Breath sounds equal, clear to auscultation bilaterally.  No wheezes, and 

no crackles


HEART: Regular rate and rhythm, normal S1 and S2, no murmurs, rubs or gallops


ABDOMEN: Soft, nontender, normoactive bowel sounds.  No guarding, no rebound.  

No masses


EXTREMITIES:  No clubbing or cyanosis. No cords, erythema, or tenderness


NEUROLOGICAL: moves all extremities


SKIN: Warm, Dry, normal turgor, no rashes or lesions noted.





ED Treatment Course





- LABORATORY


CBC & Chemistry Diagram: 


 08/22/19 17:22





 08/22/19 17:22





Medical Decision Making





- Medical Decision Making





08/22/19 14:04


59 F with constipation x 3 days. Difficult to assess abdominal tenderness as pt 

is non-verbal. However, her abdomen is soft and non-distended. Pt has not 

vomited and does not clinically appear obstructed.


Pt is unable to have CT scan due to contractures of legs and inability to lie 

straight.


- XR abdomen


- Fleets enema


- Tylenol for pain





08/22/19 16:31


Pt with large amount of stool passage with fleets enema.


However, abdominal XR is concerning for SBO





Will consult surgery





08/22/19 17:58


Discussed with Dr. Alfaro, who recommends NPO. Repeat films tomorrow.





Labs pending.





*DC/Admit/Observation/Transfer


Diagnosis at time of Disposition: 


 SBO (small bowel obstruction)








- Discharge Dispostion


Condition at time of disposition: Stable


Decision to Admit order: Yes





- Referrals





- Patient Instructions





- Post Discharge Activity





- Attestations


Physician Attestion: 





08/22/19 17:59








I, Dr. Wyatt Chilel MD, attest that this document has been prepared under my 

direction and personally reviewed by me in its entirety.   I further attest, 

that it accurately reflects all work, treatment, procedures and medical decision

-making performed by me.

## 2019-08-23 PROCEDURE — 3E0G76Z INTRODUCTION OF NUTRITIONAL SUBSTANCE INTO UPPER GI, VIA NATURAL OR ARTIFICIAL OPENING: ICD-10-PCS | Performed by: NURSE PRACTITIONER

## 2019-08-23 PROCEDURE — 0DH67UZ INSERTION OF FEEDING DEVICE INTO STOMACH, VIA NATURAL OR ARTIFICIAL OPENING: ICD-10-PCS | Performed by: NURSE PRACTITIONER

## 2019-08-23 RX ADMIN — PIPERACILLIN SODIUM,TAZOBACTAM SODIUM SCH MLS/HR: 3; .375 INJECTION, POWDER, FOR SOLUTION INTRAVENOUS at 09:53

## 2019-08-23 RX ADMIN — LEVETIRACETAM SCH MG: 100 INJECTION, SOLUTION, CONCENTRATE INTRAVENOUS at 23:08

## 2019-08-23 RX ADMIN — PIPERACILLIN SODIUM,TAZOBACTAM SODIUM SCH MLS/HR: 3; .375 INJECTION, POWDER, FOR SOLUTION INTRAVENOUS at 17:53

## 2019-08-23 RX ADMIN — LEVETIRACETAM SCH MG: 100 INJECTION, SOLUTION, CONCENTRATE INTRAVENOUS at 09:53

## 2019-08-23 RX ADMIN — MORPHINE SULFATE PRN MG: 2 INJECTION, SOLUTION INTRAMUSCULAR; INTRAVENOUS at 23:09

## 2019-08-23 RX ADMIN — PIPERACILLIN SODIUM,TAZOBACTAM SODIUM SCH MLS/HR: 3; .375 INJECTION, POWDER, FOR SOLUTION INTRAVENOUS at 01:28

## 2019-08-23 RX ADMIN — DEXTROSE MONOHYDRATE, SODIUM CHLORIDE, AND POTASSIUM CHLORIDE SCH MLS/HR: 50; 9; 2.98 INJECTION, SOLUTION INTRAVENOUS at 10:00

## 2019-08-23 NOTE — PROC
Procedure Note


Procedure: 





Surgery





Patient being followed for SBO seen and examined at bedside with family and her 

Aide from the facility at bedside. Patient's sister states she appears much 

better after NGT placement. The patient has been resting comfortably. The NGT 

has been  set to low, continuous wall suction with low output this morning. 





 Vital Signs











Temp  97.7 F   08/23/19 14:00


 


Pulse  57 L  08/23/19 14:00


 


Resp  16   08/23/19 14:00


 


BP  127/63   08/23/19 14:00


 


Pulse Ox  98   08/23/19 08:50








 Intake & Output











 08/22/19 08/23/19 08/23/19





 23:59 11:59 23:59


 


Intake Total 0 750 


 


Output Total   200


 


Balance 0 750 -200


 


Weight 62 lb  


 


Intake:   


 


  IV  600 


 


    DEXTROSE 5%-NORMAL SALINE  600 





    +40 MEQ KCL - 40 meq In 1   





    ,000 ml @ 75 mls/hr IV   





    ASDIR ROSE MARIE Rx#:[id number]   


 


  IVPB  150 


 


  Oral 0  


 


Output:   


 


  Gastric Drainage   200


 


Other:   


 


  Voiding Method Diaper Diaper Diaper


 


  # Unmeasured Voids   


 


    Void  1 


 


  Bowel Movement  No 


 


  Height 5 ft  


 


  Body Mass Index (BMI) 12.1  


 


  Weight Measurement Method Patient Lift Scale  


 


  Weight Measurement Method Estimated by Staff  








 CBC, BMP





 08/22/19 17:22 





 08/22/19 17:22 





PE:


Patient resting comfortably, NAD

## 2019-08-23 NOTE — PN
Physical Exam: 


SUBJECTIVE: Patient seen and examined at bedside. Surgical team present. Sister 

present. 








OBJECTIVE:





 Vital Signs











 Period  Temp  Pulse  Resp  BP Sys/Carlson  Pulse Ox


 


 Last 24 Hr  98.2 F-98.6 F  124-130  18-20  /62-94  


























 Laboratory Results - last 24 hr











  08/22/19 08/22/19 08/22/19





  17:22 17:22 17:22


 


WBC   16.0 H 


 


RBC   5.71 H 


 


Hgb   16.5 H 


 


Hct   50.0 H D 


 


MCV   87.5 


 


MCH   28.9 


 


MCHC   33.1 


 


RDW   14.0  D 


 


Plt Count   314 


 


MPV   9.5 


 


Neutrophils %   No Result Required. 


 


Neutrophils % (Manual)   93.0 H* 


 


Lymphocytes %   No Result Required. 


 


Lymphocytes % (Manual)   4.0 L 


 


Monocytes % (Manual)   3 L 


 


Platelet Estimate   Adequate 


 


PT with INR   


 


INR   


 


PTT (Actin FS)  32.9  


 


Sodium    143


 


Potassium    3.4 L


 


Chloride    96 L


 


Carbon Dioxide    32


 


Anion Gap    15


 


BUN    15.0


 


Creatinine    0.5 L


 


Est GFR (CKD-EPI)AfAm    121.07


 


Est GFR (CKD-EPI)NonAf    104.46


 


Random Glucose    145 H


 


Lactic Acid   


 


Calcium    10.0


 


Total Bilirubin    0.7


 


AST    30


 


ALT    16


 


Alkaline Phosphatase    168 H


 


Total Protein    8.1


 


Albumin    4.0


 


Blood Type   


 


Antibody Screen   














  08/22/19 08/22/19 08/22/19





  17:22 17:22 17:27


 


WBC   


 


RBC   


 


Hgb   


 


Hct   


 


MCV   


 


MCH   


 


MCHC   


 


RDW   


 


Plt Count   


 


MPV   


 


Neutrophils %   


 


Neutrophils % (Manual)   


 


Lymphocytes %   


 


Lymphocytes % (Manual)   


 


Monocytes % (Manual)   


 


Platelet Estimate   


 


PT with INR  11.2  


 


INR  1.00  


 


PTT (Actin FS)   


 


Sodium   


 


Potassium   


 


Chloride   


 


Carbon Dioxide   


 


Anion Gap   


 


BUN   


 


Creatinine   


 


Est GFR (CKD-EPI)AfAm   


 


Est GFR (CKD-EPI)NonAf   


 


Random Glucose   


 


Lactic Acid   


 


Calcium   


 


Total Bilirubin   


 


AST   


 


ALT   


 


Alkaline Phosphatase   


 


Total Protein   


 


Albumin   


 


Blood Type   O POSITIVE  O POSITIVE


 


Antibody Screen    Negative














  08/22/19





  19:40


 


WBC 


 


RBC 


 


Hgb 


 


Hct 


 


MCV 


 


MCH 


 


MCHC 


 


RDW 


 


Plt Count 


 


MPV 


 


Neutrophils % 


 


Neutrophils % (Manual) 


 


Lymphocytes % 


 


Lymphocytes % (Manual) 


 


Monocytes % (Manual) 


 


Platelet Estimate 


 


PT with INR 


 


INR 


 


PTT (Actin FS) 


 


Sodium 


 


Potassium 


 


Chloride 


 


Carbon Dioxide 


 


Anion Gap 


 


BUN 


 


Creatinine 


 


Est GFR (CKD-EPI)AfAm 


 


Est GFR (CKD-EPI)NonAf 


 


Random Glucose 


 


Lactic Acid  1.9


 


Calcium 


 


Total Bilirubin 


 


AST 


 


ALT 


 


Alkaline Phosphatase 


 


Total Protein 


 


Albumin 


 


Blood Type 


 


Antibody Screen 








                           Active Medications











Generic Name Dose Route Start Last Admin





  Trade Name Bartoloq  PRN Reason Stop Dose Admin


 


Acetaminophen  650 mg  08/22/19 19:45  





  Tylenol Suppository -  PA   





  Q6H PRN   





  FEVER   





     





     





     


 


Albuterol Sulfate  1 amp  08/22/19 19:38  





  Ventolin 0.083% Nebulizer Soln -  NEB   





  Q4H PRN   





  other   





     





     





     


 


Dextrose/Sodium Chloride  40 meq in 1,000 mls @ 75 mls/hr  08/22/19 20:00  08/22 /19 21:39





  Dextrose 5%-Normal Saline+40 Meq Kcl -  IV   75 mls/hr





  ASDIR ROSE MARIE   Administration





     





     





     





     


 


Piperacillin Sod/Tazobactam  50 mls @ 100 mls/hr  08/22/19 21:00  08/23/19 01:28





  Sod 3.375 gm/ Dextrose  IVPB   100 mls/hr





  Q8H-IV ROSE MARIE   Administration





     





     





  Protocol   





     


 


Levetiracetam  500 mg  08/22/19 23:00  08/22/19 23:44





  Keppra Injection -  IVPB   500 mg





  BID ROSE MARIE   Administration





     





     





     





     











ASSESSMENT/PLAN


59 year-old female resident of [facility], with a PMH significant for 

profound mental retardation, cerebral palsy, congenital quadriplegia, severe 

scoliosis, seizure disorder, and GERD. Admitted for SBO.





SBO


   --repeat xray pending


   --afebrile, elevated WBC


   --surgery to place NGT


   --continue Zosyn 


   --NPO


   --IV fluids


   --morphine 0.5mg IVP PRN for pain - one dose given last night, tolerated well





Profound mental retardation


Cerebral palsy


Congenital quadriplegia


Severe scoliosis


   --dependent for all ADLs





Seizure disorder


   --on PO carbamazepine at [facility]


   --Keppra IV 500mg BID while NPO





GERD


   --hold protonix





Hypokalemia


   --replete





FEN


   Fluids: D5NS+40K@75mL/hr


   Electrolytes: replete as indicated


   Nutrition: NPO





DVT prophylaxis: no chemical prophylaxis due to possible surgical intervention





Dispo: continues to require inpatient care.

















Visit type





- Emergency Visit


Emergency Visit: Yes


ED Registration Date: 08/22/19


Care time: The patient presented to the Emergency Department on the above date 

and was hospitalized for further evaluation of their emergent condition.





- New Patient


This patient is new to me today: No





- Critical Care


Critical Care patient: No

## 2019-08-23 NOTE — PN
Progress Note (short form)





- Note


Progress Note: 





Surgery





Patient being followed for SBO seen and examined at bedside with family and her 

Aide from the facility at bedside. Patient's sister states she appears much 

better after NGT placement. The patient has been resting comfortably. The NGT 

has been  set to low, continuous wall suction with low output this morning. 





 Vital Signs











Temp  97.7 F   08/23/19 14:00


 


Pulse  57 L  08/23/19 14:00


 


Resp  16   08/23/19 14:00


 


BP  127/63   08/23/19 14:00


 


Pulse Ox  98   08/23/19 08:50








 Intake & Output











 08/22/19 08/23/19 08/23/19





 23:59 11:59 23:59


 


Intake Total 0 750 


 


Output Total   200


 


Balance 0 750 -200


 


Weight 62 lb  


 


Intake:   


 


  IV  600 


 


    DEXTROSE 5%-NORMAL SALINE  600 





    +40 MEQ KCL - 40 meq In 1   





    ,000 ml @ 75 mls/hr IV   





    ASDIR ROSE MARIE Rx#:[id number]   


 


  IVPB  150 


 


  Oral 0  


 


Output:   


 


  Gastric Drainage   200


 


Other:   


 


  Voiding Method Diaper Diaper Diaper


 


  # Unmeasured Voids   


 


    Void  1 


 


  Bowel Movement  No 


 


  Height 5 ft  


 


  Body Mass Index (BMI) 12.1  


 


  Weight Measurement Method Patient Lift Scale  


 


  Weight Measurement Method Estimated by Staff  








 CBC, BMP





 08/22/19 17:22 





 08/22/19 17:22 





PE:


Patient resting comfortably, NAD


Unlabored resp on RA


Extremities contracted x4


Abd: Soft, nontender, mild distention,  absent bowel sounds, no guarding, no 

rebound.  Healed modline surgical scar and lateral scar. 

















Problem List





- Problems


(1) SBO (small bowel obstruction)


Assessment/Plan: 


-NPO/NGT


-IVF


- serial abd exams 


- f/u serial x rays


-transfer to [facility] pending bed availability











Code(s): K56.609 - UNSP INTESTNL OBST, UNSP AS TO PARTIAL VERSUS COMPLETE OBST

## 2019-08-23 NOTE — CONSULT
- Consultation


REQUESTING PROVIDER: Wyatt Chilel MD





CONSULT REQUEST: We have been asked to surgically evaluate this patient for SBO





PCP:Kasie Norman





HISTORY OF PRESENT ILLNESS: ARIADNEP who is a 58 y/o resident of [facility] who 

presented to the [facility] ED w/abdominal pain and distention and obstipation and 

constipation; in the ED enemas were given w/apparent good results h/e abdominal 

x rays were done and read as c/w an SBO.





PMHx:  MR/CP/congenital quadaplegia/GERD/scoliosis       





PSHx:  unknown abdominal surgery      


 Home Medications











 Medication  Instructions  Recorded


 


Carbamazepine Xr [Tegretol Xr -] 400 mg PO AM 07/24/14


 


Polyethylene Glycol 3350 [Miralax 17 gm PO DAILY 07/24/14





119 gm Btl -]  


 


Albuterol 0.083% Nebulizer Sol 1 neb NEB Q4H PRN 08/22/19





[Ventolin 0.083%]  


 


Calcium Carbonate/Vitamin D3 1 each PO BID 08/22/19





[Oyster Shell 500-Vit D3 200 Tb]  


 


Carbamazepine [Tegretol -] 200 mg PO HS 08/22/19


 


Famotidine [Pepcid] 40 mg PO BID 08/22/19


 


Fexofenadine HCl [Allegra Allergy] 30 mg PO BID 08/22/19


 


Pedi Multivit 158/Iron/Vit K1 1 each PO DAILY 08/22/19





[Cerovite Jr]  


 


Simethicone 40 mg PO Q4H 08/22/19


 


Wheat Dextrin [Benefiber] 1 each PO DAILY 08/22/19








 Allergies











Allergy/AdvReac Type Severity Reaction Status Date / Time


 


phenytoin sodium AdvReac Mild  Verified 08/22/19 18:36





[From Dilantin]     


 


phenytoin sodium extended AdvReac Mild  Verified 08/22/19 18:36





[From Dilantin]     


 


sellfish Allergy   Uncoded 08/22/19 18:36








REVIEW OF SYSTEMS: unobtainable due to patients underlying status.








PHYSICAL EXAM:


GENERAL: Awake, alert, and not oriented, in no acute distress ??.


HEAD: Normal with no signs of trauma.


EYES:  sclera anicteric, conjunctiva clear.


NECK: Limited ROM, supple without lymphadenopathy, JVD, or masses.


ABDOMEN: Soft, nontender,distended, absent bowel sounds, no guarding, no rebound

, no masses.  No organomegaly. Healed modline surgical scar.


MUSCULOSKELETAL: Contracted x 4.


UPPER EXTREMITIES: 2+ pulses, warm, well-perfused. No cyanosis. Cap refill <2 

seconds. No peripheral edema. Contracted.


LOWER EXTREMITIES: 2+ pulses, warm, well-perfused. No calf tenderness. No 

peripheral edema. Contracted.


NEUROLOGICAL: Aphasic, gait not observed.


SKIN: Warm, dry, normal turgor, no rashes or lesions noted.


 Vital Signs











Temperature  98 F   08/23/19 09:32


 


Pulse Rate  112 H  08/23/19 09:32


 


Respiratory Rate  18   08/23/19 09:32


 


Blood Pressure  135/89   08/23/19 09:32


 


O2 Sat by Pulse Oximetry (%)  98   08/23/19 08:50








 Lab Results











WBC  16.0 K/mm3 (4.0-10.8)  H  08/22/19  17:22    


 


RBC  5.71 M/mm3 (3.60-5.2)  H  08/22/19  17:22    


 


Hgb  16.5 GM/dl (10.7-15.3)  H  08/22/19  17:22    


 


Hct  50.0 % (32.4-45.2)  H D 08/22/19  17:22    


 


MCV  87.5 fl (80-96)   08/22/19  17:22    


 


MCHC  33.1 g/dl (32.0-36.0)   08/22/19  17:22    


 


RDW  14.0 % (11.6-15.6)  D 08/22/19  17:22    


 


Plt Count  314 K/MM3 (134-434)   08/22/19  17:22    


 


Sodium  143 mmol/L (136-145)   08/22/19  17:22    


 


Potassium  3.4 mmol/L (3.5-5.1)  L  08/22/19  17:22    


 


Chloride  96 mmol/L ()  L  08/22/19  17:22    


 


Carbon Dioxide  32 mmol/L (21-32)   08/22/19  17:22    


 


Anion Gap  15 MMOL/L (8-16)   08/22/19  17:22    


 


BUN  15.0 mg/dl (7-18)   08/22/19  17:22    


 


Creatinine  0.5 mg/dl (0.55-1.3)  L  08/22/19  17:22    


 


Random Glucose  145 mg/dl ()  H  08/22/19  17:22    


 


Calcium  10.0 mg/dl (8.5-10)   08/22/19  17:22    


 


Blood Type  O POSITIVE   08/22/19  17:27    


 


Antibody Screen  Negative   08/22/19  17:27    


 


INR  1.00  (0.82-1.09)   08/22/19  17:22    








AXR's c/w possible evolving sbo.





IMP: evolving SBO.





PLAN: NPO/NGT/IVF/serial exams and x rays and xfer to [facility] due to risk of 

aspiration and need for possible supervised small bowel series; d/w the family 

extensively; I personally placed the NGT and confirmed its location.








Wyatt Alfaro MD FACS

## 2019-08-23 NOTE — CON.ID
Consult


Consult Specialty:: infectious diseases


Referred by:: Peg


Reason for Consultation:: Leukocytosis





- History of Present Illness


Chief Complaint: pain abd


History of Present Illness: 





59 year-old female resident of [facility], with a PMH significant for 

profound mental retardation, cerebral palsy, congenital quadriplegia, severe 

scoliosis, seizure disorder, and GERD. Aides have observed patient becoming 

progressively constipated after starting famotidine for GERD. Last normal BM 

was 3 days ago. Today patient began to appear very uncomfortable and has been 

crying. She was given a suppository and a fleets enema this morning, but only 

passed 2 hard priyanka of stool. Patient ate breakfast this morning as usual. 

Has not had any vomiting. No fever. No observed sweats, chills. 


Patient was seen by surgery now has a ng tube in place


sister in the room says she is feeling much better








- History Source


History Provided By: Family Member


Limitations to Obtaining History: Clinical Condition





- Alcohol/Substance Use


Hx Alcohol Use: No





- Smoking History


Smoking history: Never smoked


Have you smoked in the past 12 months: No


Aproximately how many cigarettes per day: 0





Home Medications





- Allergies


Allergies/Adverse Reactions: 


 Allergies











Allergy/AdvReac Type Severity Reaction Status Date / Time


 


phenytoin sodium AdvReac Mild  Verified 08/22/19 18:36





[From Dilantin]     


 


phenytoin sodium extended AdvReac Mild  Verified 08/22/19 18:36





[From Dilantin]     


 


sellfish Allergy   Uncoded 08/22/19 18:36














- Home Medications


Home Medications: 


Ambulatory Orders





Carbamazepine Xr [Tegretol Xr -] 400 mg PO AM 07/24/14 


Polyethylene Glycol 3350 [Miralax 119 gm Btl -] 17 gm PO DAILY 07/24/14 


Albuterol 0.083% Nebulizer Sol [Ventolin 0.083%] 1 neb NEB Q4H PRN 08/22/19 


Calcium Carbonate/Vitamin D3 [Oyster Shell 500-Vit D3 200 Tb] 1 each PO BID 08/ 22/19 


Carbamazepine [Tegretol -] 200 mg PO HS 08/22/19 


Famotidine [Pepcid] 40 mg PO BID 08/22/19 


Fexofenadine HCl [Allegra Allergy] 30 mg PO BID 08/22/19 


Pedi Multivit 158/Iron/Vit K1 [Cerovite Jr] 1 each PO DAILY 08/22/19 


Simethicone 40 mg PO Q4H 08/22/19 


Wheat Dextrin [Benefiber] 1 each PO DAILY 08/22/19 











Review of Systems


Unable to obtain ROS, reason: unable to obtain





Physical Exam


Vital Signs: 


 Vital Signs











Temperature  98 F   08/23/19 09:32


 


Pulse Rate  112 H  08/23/19 09:32


 


Respiratory Rate  18   08/23/19 09:32


 


Blood Pressure  135/89   08/23/19 09:32


 


O2 Sat by Pulse Oximetry (%)  98   08/23/19 08:50











Constitutional: Yes: No Distress, Calm


Cardiovascular: Yes: Regular Rate and Rhythm


Respiratory: Yes: Regular, CTA Bilaterally


Gastrointestinal: Yes: Other (absent bowel sounds,ng tube in place)


Musculoskeletal: Yes: Other (contracted)


Extremities: Yes: Other (contracted)


Neurological: Yes: Alert, Other


Psychiatric: Yes: Other


Labs: 


 CBC, BMP





 08/22/19 17:22 





 08/22/19 17:22 











Imaging





- Results


X-ray: Report Reviewed, Image Reviewed





Assessment/Plan





59 year-old female resident of [facility], with a PMH significant for 

profound mental retardation, cerebral palsy, congenital quadriplegia, severe 

scoliosis, seizure disorder, and GERD. Admitted for SBO.





SBO


Profound mental retardation


Cerebral palsy


Congenital quadriplegia


Severe scoliosis


Seizure disorder


GERD





plan


npo


hydration


ng suctioning


surgery on board


rest as per the team and surgery

## 2019-08-24 LAB
ALBUMIN SERPL-MCNC: 2.2 G/DL (ref 3.4–5)
ALP SERPL-CCNC: 109 U/L (ref 45–117)
ALT SERPL-CCNC: 10 U/L (ref 13–61)
ANION GAP SERPL CALC-SCNC: 2 MMOL/L (ref 8–16)
AST SERPL-CCNC: 14 U/L (ref 15–37)
BASOPHILS # BLD: 0.1 % (ref 0–2)
BILIRUB SERPL-MCNC: 0.5 MG/DL (ref 0.2–1)
BUN SERPL-MCNC: 19.9 MG/DL (ref 7–18)
CALCIUM SERPL-MCNC: 9.2 MG/DL (ref 8.5–10.1)
CHLORIDE SERPL-SCNC: 128 MMOL/L (ref 98–107)
CO2 SERPL-SCNC: 26 MMOL/L (ref 21–32)
CREAT SERPL-MCNC: 0.6 MG/DL (ref 0.55–1.3)
DEPRECATED RDW RBC AUTO: 15.7 % (ref 11.6–15.6)
EOSINOPHIL # BLD: 0 % (ref 0–4.5)
GLUCOSE SERPL-MCNC: 156 MG/DL (ref 74–106)
HCT VFR BLD CALC: 40.9 % (ref 32.4–45.2)
HGB BLD-MCNC: 13.1 GM/DL (ref 10.7–15.3)
LYMPHOCYTES # BLD: 11.1 % (ref 8–40)
MAGNESIUM SERPL-MCNC: 2.4 MG/DL (ref 1.8–2.4)
MCH RBC QN AUTO: 28.5 PG (ref 25.7–33.7)
MCHC RBC AUTO-ENTMCNC: 31.9 G/DL (ref 32–36)
MCV RBC: 89.2 FL (ref 80–96)
MONOCYTES # BLD AUTO: 15.4 % (ref 3.8–10.2)
NEUTROPHILS # BLD: 73.4 % (ref 42.8–82.8)
PLATELET # BLD AUTO: 285 K/MM3 (ref 134–434)
PMV BLD: 9.6 FL (ref 7.5–11.1)
POTASSIUM SERPLBLD-SCNC: 6.6 MMOL/L (ref 3.5–5.1)
PROT SERPL-MCNC: 6 G/DL (ref 6.4–8.2)
RBC # BLD AUTO: 4.59 M/MM3 (ref 3.6–5.2)
SODIUM SERPL-SCNC: 156 MMOL/L (ref 136–145)
WBC # BLD AUTO: 7 K/MM3 (ref 4–10)

## 2019-08-24 RX ADMIN — MORPHINE SULFATE PRN MG: 2 INJECTION, SOLUTION INTRAMUSCULAR; INTRAVENOUS at 16:58

## 2019-08-24 RX ADMIN — MORPHINE SULFATE PRN MG: 2 INJECTION, SOLUTION INTRAMUSCULAR; INTRAVENOUS at 08:22

## 2019-08-24 RX ADMIN — METOPROLOL TARTRATE PRN MG: 5 INJECTION, SOLUTION INTRAVENOUS at 16:05

## 2019-08-24 RX ADMIN — DEXTROSE MONOHYDRATE SCH MLS/HR: 50 INJECTION, SOLUTION INTRAVENOUS at 21:51

## 2019-08-24 RX ADMIN — PIPERACILLIN SODIUM,TAZOBACTAM SODIUM SCH MLS/HR: 3; .375 INJECTION, POWDER, FOR SOLUTION INTRAVENOUS at 09:45

## 2019-08-24 RX ADMIN — LEVETIRACETAM SCH MG: 100 INJECTION, SOLUTION, CONCENTRATE INTRAVENOUS at 09:46

## 2019-08-24 RX ADMIN — PIPERACILLIN SODIUM,TAZOBACTAM SODIUM SCH MLS/HR: 3; .375 INJECTION, POWDER, FOR SOLUTION INTRAVENOUS at 03:20

## 2019-08-24 RX ADMIN — PIPERACILLIN SODIUM,TAZOBACTAM SODIUM SCH: 3; .375 INJECTION, POWDER, FOR SOLUTION INTRAVENOUS at 18:27

## 2019-08-24 RX ADMIN — LEVETIRACETAM SCH MG: 100 INJECTION, SOLUTION, CONCENTRATE INTRAVENOUS at 22:18

## 2019-08-24 NOTE — EKG
Test Reason : 

Blood Pressure : ***/*** mmHG

Vent. Rate : 127 BPM     Atrial Rate : 127 BPM

   P-R Int : 208 ms          QRS Dur : 056 ms

    QT Int : 288 ms       P-R-T Axes : 000 043 006 degrees

   QTc Int : 418 ms

 

SINUS TACHYCARDIA

LOW VOLTAGE QRS

NONSPECIFIC T WAVE ABNORMALITY

ABNORMAL ECG

WHEN COMPARED WITH ECG OF 23-AUG-2019 20:46,

NO SIGNIFICANT CHANGE WAS FOUND

CLINICAL CORRELATION IS RECOMMENDED

Confirmed by MARTÍN FINK, JEAN ([id number]) on 8/24/2019 3:40:47 PM

 

Referred By: FARHAN ESCALERA           Confirmed By:JEAN RESENDIZ MD
Test Reason : 

Blood Pressure : ***/*** mmHG

Vent. Rate : 135 BPM     Atrial Rate : 135 BPM

   P-R Int : 176 ms          QRS Dur : 060 ms

    QT Int : 360 ms       P-R-T Axes : [phone] degrees

   QTc Int : 540 ms

 

SINUS TACHYCARDIA

T WAVE ABNORMALITY, CONSIDER LATERAL ISCHEMIA

ABNORMAL ECG

WHEN COMPARED WITH ECG OF 11-JUN-2019 16:54,

QRS VOLTAGE HAS DECREASED

NON-SPECIFIC CHANGE IN ST SEGMENT IN ANTERIOR LEADS

T WAVE AMPLITUDE HAS DECREASED IN INFERIOR LEADS

CLINICAL CORRELATION IS RECOMMENDED

Confirmed by MARTÍN FINK, JEAN ([id number]) on 8/24/2019 3:50:51 PM

 

Referred By:  HI           Confirmed By:JEAN RESENDIZ MD
done

## 2019-08-24 NOTE — CON.CARD
Consult


Consult Specialty:: cardiology


Referred by:: medicine


Reason for Consultation:: tachycardia





- History of Present Illness


Chief Complaint: sbo


History of Present Illness: 


59F h/o mental retardation, cerebral palsy, quadriplegia, scoliosis, seizure 

disorder p/w constipation, dx with SBO, now with NG tube.  Unable to obtain 

history from patient, per sister have not noticed difficulty breathing, no 

cardiac history.  





- Alcohol/Substance Use


Hx Alcohol Use: No





- Smoking History


Smoking history: Never smoked


Have you smoked in the past 12 months: No


Aproximately how many cigarettes per day: 0





Home Medications





- Allergies


Allergies/Adverse Reactions: 


 Allergies











Allergy/AdvReac Type Severity Reaction Status Date / Time


 


phenytoin sodium AdvReac Mild  Verified 08/22/19 18:36





[From Dilantin]     


 


phenytoin sodium extended AdvReac Mild  Verified 08/22/19 18:36





[From Dilantin]     


 


sellfish Allergy   Uncoded 08/22/19 18:36














- Home Medications


Home Medications: 


Ambulatory Orders





Carbamazepine Xr [Tegretol Xr -] 400 mg PO AM 07/24/14 


Polyethylene Glycol 3350 [Miralax 119 gm Btl -] 17 gm PO DAILY 07/24/14 


Albuterol 0.083% Nebulizer Sol [Ventolin 0.083%] 1 neb NEB Q4H PRN 08/22/19 


Calcium Carbonate/Vitamin D3 [Oyster Shell 500-Vit D3 200 Tb] 1 each PO BID 08/ 22/19 


Carbamazepine [Tegretol -] 200 mg PO HS 08/22/19 


Famotidine [Pepcid] 40 mg PO BID 08/22/19 


Fexofenadine HCl [Allegra Allergy] 30 mg PO BID 08/22/19 


Pedi Multivit 158/Iron/Vit K1 [Cerovite Jr] 1 each PO DAILY 08/22/19 


Simethicone 40 mg PO Q4H 08/22/19 


Wheat Dextrin [Benefiber] 1 each PO DAILY 08/22/19 











Family Disease History





- Family Disease History


Family History: Unable to Obtain (nonverbal)





Review of Systems


Unable to obtain ROS, reason: nonverbal


Vital Signs: 


 Vital Signs











Temperature  97.9 F   08/24/19 08:20


 


Pulse Rate  126 H  08/24/19 08:20


 


Respiratory Rate  24 H  08/24/19 08:20


 


Blood Pressure  115/85   08/24/19 11:24


 


O2 Sat by Pulse Oximetry (%)  98   08/23/19 08:50











Constitutional: Yes: No Distress


HENT: Yes: Atraumatic


Neck: Yes: Supple


Respiratory: Yes: Regular, CTA Bilaterally


Gastrointestinal: Yes: Soft


Cardiovascular: Yes: Tachycardia


JVD: No


Heart Sounds: Yes: S1, S2


Edema: No


Peripheral Pulses WNL: Yes


Integumentary: No: Jaundice


Neurological: Yes: Alert


Psychiatric: No: Agitated





- Other Data


Labs, Other Data: 


 CBC, BMP





 08/22/19 17:22 





 08/22/19 17:22 





 INR, PTT











INR  1.00  (0.82-1.09)   08/22/19  17:22    








 Troponin, BNP











  08/23/19





  23:59


 


Troponin I  < 0.02








 Troponin, BNP











  08/23/19





  23:59


 


Troponin I  < 0.02














Assessment/Plan





EKG: sinus tachycardia, nonspecific ST/T wave changes





tele: sinus tachycardia








tachycardia


- sinus, monitoring on tele


- echo ordered


- likely in setting of underlying pain, anxiety, SBO - manage per primary


- trop neg x 1, no ischemia on EKG - unlikely ACS





SBO


- manage per surgery





seizure disorder


- manage per primary





GERD


-holding meds, NPO

## 2019-08-24 NOTE — PN
Progress Note, Physician


Chief Complaint: 





sister at bedside.  concerned over patient having worsening pain and 

discomfort.  pt is grimacing, showing signs of pain and discomfort.


ativan 0.5mg given


abd xray ordered


ekg ordered


chest xray ordered for congestion


History of Present Illness: 





Patient is a 59 year old female (resident of [facility]) with a PMHx 

significant for profound mental retardation, cerebral palsy, congenital 

quadriplegia, severe scoliosis, seizure disorder, and GERD.  Transferred from 

[place] to [facility] for further management of SBO, sepsis and tachycardia.  








- Current Medication List


Current Medications: 


Active Medications





Acetaminophen (Tylenol Suppository -)  650 mg AK Q6H PRN


   PRN Reason: FEVER


Albuterol Sulfate (Ventolin 0.083% Nebulizer Soln -)  1 amp NEB Q4H PRN


   PRN Reason: SHORTNESS OF BREATH


Dextrose/Sodium Chloride (Dextrose 5%-Normal Saline+40 Meq Kcl -)  40 meq in 1,

000 mls @ 75 mls/hr IV ASDIR ROSE MARIE


   Last Admin: 08/23/19 19:00 Dose:  Not Given


Piperacillin Sod/Tazobactam (Sod 3.375 gm/ Dextrose)  50 mls @ 100 mls/hr IVPB 

Q8H-IV ROSE MARIE; Protocol


   Last Admin: 08/24/19 09:45 Dose:  100 mls/hr


Levetiracetam (Keppra Injection -)  500 mg IVPB BID ROSE MARIE


   Last Admin: 08/24/19 09:46 Dose:  500 mg


Lorazepam (Ativan Injection -)  0.5 mg IVPUSH ONCE ONE


   Stop: 08/24/19 10:46


Lorazepam (Ativan Injection -)  0.5 mg IVPUSH Q6H PRN


   PRN Reason: ANXIETY


Morphine Sulfate (Morphine Sulfate)  0.5 mg IM Q8H PRN


   PRN Reason: PAIN LEVEL 6-10


   Last Admin: 08/24/19 08:22 Dose:  0.5 mg











- Objective


Vital Signs: 


 Vital Signs











Temperature  97.9 F   08/24/19 08:20


 


Pulse Rate  126 H  08/24/19 08:20


 


Respiratory Rate  24 H  08/24/19 08:20


 


Blood Pressure  118/91   08/24/19 08:20


 


O2 Sat by Pulse Oximetry (%)  98   08/23/19 08:50











Constitutional: Yes: Mild Distress


Eyes: Yes: WNL


HENT: Yes: Atraumatic


Neck: Yes: Supple


Cardiovascular: Yes: Tachycardia


Respiratory: Yes: Accessory Muscle Use, On Nasal O2, Rales, Tachypnea


Gastrointestinal: Yes: Distention


Extremities: Yes: Other (contracted)


Integumentary: Yes: WNL


Neurological: Yes: Alert, Lethargy, Other


Labs: 


 CBC, BMP





 08/22/19 17:22 





 08/22/19 17:22 





 INR, PTT











INR  1.00  (0.82-1.09)   08/22/19  17:22    














- ....Imaging


Chest X-ray: Report Reviewed


EKG: Report Reviewed





Problem List





- Problems


(1) Sepsis


Assessment/Plan: 


patient with elevated wbc, tachycardia, respiratory rate > 24, chest xray shows 

possible early left infiltrate


already on zosyn


monitor respiratory status.  add 2 liters of nasal cannula.


blood cultures 


Code(s): A41.9 - SEPSIS, UNSPECIFIED ORGANISM   





(2) SBO (small bowel obstruction)


Assessment/Plan: 





NGT to low intermittent suction


abd xray with sbo


abdomen distended, absent bowel sounds


monitor drain output


Code(s): K56.609 - UNSP INTESTNL OBST, UNSP AS TO PARTIAL VERSUS COMPLETE OBST 

  





(3) Aspiration into airway


Assessment/Plan: 


chest xray shows left lower lobe infiltrate.  on zosyn.


support with 2 liters of nasal cannula


monitor oxygen levels.


Code(s): T17.908A - UNSP FB IN RESP TRACT, PART UNSP CAUSING OTH INJURY, INIT   





(4) Decreased oral intake


Assessment/Plan: 


NPO


start clinimax if prolonged NPO


Code(s): R63.8 - OTHER SYMPTOMS AND SIGNS CONCERNING FOOD AND FLUID INTAKE   





(5) Seizure


Assessment/Plan: 


on keppra.  


on tegratol in NH, continue keppra iv bid





Code(s): R56.9 - UNSPECIFIED CONVULSIONS   





(6) Hypokalemia


Assessment/Plan: 


on NS with K.


monitor K levels with repeat labs.


Code(s): E87.6 - HYPOKALEMIA   





(7) Profound mental handicap


Assessment/Plan: 


supportive care.





Code(s): F73 - PROFOUND INTELLECTUAL DISABILITIES   





(8) Tachycardia


Assessment/Plan: 


tachycardia likely in the setting of abdominal pain, anxiety, NGT


- trop neg x 1, no ischemia on EKG


on morphine, lopressor iv, given ativan 0.5 mg x 1 today 


apply 2 liters of nasal cannula for congestion.





Code(s): R00.0 - TACHYCARDIA, UNSPECIFIED   





(9) Left pulmonary infiltrate on CXR


Assessment/Plan: 


apply oxygen 2 liters.  patient noted to be congested and breathing rate 24s


on zosyn


Code(s): R91.8 - OTHER NONSPECIFIC ABNORMAL FINDING OF LUNG FIELD   





(10) Prophylactic measure


Assessment/Plan: 


fen


npo


on d5 40meq


monitor electrolytes 





full code





Code(s): Z29.9 - ENCOUNTER FOR PROPHYLACTIC MEASURES, UNSPECIFIED   





Visit type





- Emergency Visit


Emergency Visit: Yes


ED Registration Date: 08/22/19


Care time: The patient presented to the Emergency Department on the above date 

and was hospitalized for further evaluation of their emergent condition.





- New Patient


This patient is new to me today: Yes


Date on this admission: 08/24/19





- Critical Care


Critical Care patient: No





- Discharge Referral


Referred to [facility] No

## 2019-08-24 NOTE — PN
Progress Note, Physician





- Current Medication List


Current Medications: 


Active Medications





Acetaminophen (Tylenol Suppository -)  650 mg AR Q6H PRN


   PRN Reason: FEVER


Albuterol Sulfate (Ventolin 0.083% Nebulizer Soln -)  1 amp NEB Q4H PRN


   PRN Reason: SHORTNESS OF BREATH


Dextrose/Sodium Chloride (Dextrose 5%-Normal Saline+40 Meq Kcl -)  40 meq in 1,

000 mls @ 75 mls/hr IV ASDIR ROSE MARIE


   Last Admin: 08/23/19 19:00 Dose:  Not Given


Piperacillin Sod/Tazobactam (Sod 3.375 gm/ Dextrose)  50 mls @ 100 mls/hr IVPB 

Q8H-IV ROSE MARIE; Protocol


   Last Admin: 08/24/19 09:45 Dose:  100 mls/hr


Levetiracetam (Keppra Injection -)  500 mg IVPB BID ROSE MARIE


   Last Admin: 08/24/19 09:46 Dose:  500 mg


Lorazepam (Ativan Injection -)  0.5 mg IVPUSH Q6H PRN


   PRN Reason: ANXIETY


Morphine Sulfate (Morphine Sulfate)  0.5 mg IM Q8H PRN


   PRN Reason: PAIN LEVEL 6-10


   Last Admin: 08/24/19 08:22 Dose:  0.5 mg











- Objective


Vital Signs: 


 Vital Signs











Temperature  97.9 F   08/24/19 08:20


 


Pulse Rate  126 H  08/24/19 08:20


 


Respiratory Rate  24 H  08/24/19 08:20


 


Blood Pressure  118/91   08/24/19 08:20


 


O2 Sat by Pulse Oximetry (%)  98   08/23/19 08:50











Labs: 


 CBC, BMP





 08/22/19 17:22 





 08/22/19 17:22 





 INR, PTT











INR  1.00  (0.82-1.09)   08/22/19  17:22

## 2019-08-25 LAB
ALBUMIN SERPL-MCNC: 2 G/DL (ref 3.4–5)
ALP SERPL-CCNC: 91 U/L (ref 45–117)
ALT SERPL-CCNC: 9 U/L (ref 13–61)
ANION GAP SERPL CALC-SCNC: 4 MMOL/L (ref 8–16)
ANION GAP SERPL CALC-SCNC: 5 MMOL/L (ref 8–16)
AST SERPL-CCNC: 14 U/L (ref 15–37)
BILIRUB SERPL-MCNC: 0.5 MG/DL (ref 0.2–1)
BUN SERPL-MCNC: 21.8 MG/DL (ref 7–18)
BUN SERPL-MCNC: 22.6 MG/DL (ref 7–18)
CALCIUM SERPL-MCNC: 8.8 MG/DL (ref 8.5–10.1)
CALCIUM SERPL-MCNC: 8.9 MG/DL (ref 8.5–10.1)
CHLORIDE SERPL-SCNC: 120 MMOL/L (ref 98–107)
CHLORIDE SERPL-SCNC: 124 MMOL/L (ref 98–107)
CO2 SERPL-SCNC: 23 MMOL/L (ref 21–32)
CO2 SERPL-SCNC: 25 MMOL/L (ref 21–32)
CREAT SERPL-MCNC: 0.5 MG/DL (ref 0.55–1.3)
CREAT SERPL-MCNC: 0.6 MG/DL (ref 0.55–1.3)
GLUCOSE SERPL-MCNC: 148 MG/DL (ref 74–106)
GLUCOSE SERPL-MCNC: 166 MG/DL (ref 74–106)
MAGNESIUM SERPL-MCNC: 2.4 MG/DL (ref 1.8–2.4)
POTASSIUM SERPLBLD-SCNC: 5.6 MMOL/L (ref 3.5–5.1)
POTASSIUM SERPLBLD-SCNC: 5.8 MMOL/L (ref 3.5–5.1)
PROT SERPL-MCNC: 5.4 G/DL (ref 6.4–8.2)
SODIUM SERPL-SCNC: 151 MMOL/L (ref 136–145)
SODIUM SERPL-SCNC: 151 MMOL/L (ref 136–145)

## 2019-08-25 RX ADMIN — LEVETIRACETAM SCH MG: 100 INJECTION, SOLUTION, CONCENTRATE INTRAVENOUS at 10:13

## 2019-08-25 RX ADMIN — PIPERACILLIN SODIUM,TAZOBACTAM SODIUM SCH MLS/HR: 3; .375 INJECTION, POWDER, FOR SOLUTION INTRAVENOUS at 01:19

## 2019-08-25 RX ADMIN — LEVETIRACETAM SCH MG: 100 INJECTION, SOLUTION, CONCENTRATE INTRAVENOUS at 23:33

## 2019-08-25 RX ADMIN — MORPHINE SULFATE PRN MG: 2 INJECTION, SOLUTION INTRAMUSCULAR; INTRAVENOUS at 18:26

## 2019-08-25 RX ADMIN — PIPERACILLIN SODIUM,TAZOBACTAM SODIUM SCH MLS/HR: 3; .375 INJECTION, POWDER, FOR SOLUTION INTRAVENOUS at 17:07

## 2019-08-25 RX ADMIN — METOPROLOL TARTRATE PRN MG: 5 INJECTION, SOLUTION INTRAVENOUS at 06:54

## 2019-08-25 RX ADMIN — MORPHINE SULFATE PRN MG: 2 INJECTION, SOLUTION INTRAMUSCULAR; INTRAVENOUS at 01:11

## 2019-08-25 RX ADMIN — DEXTROSE MONOHYDRATE SCH MLS/HR: 50 INJECTION, SOLUTION INTRAVENOUS at 23:33

## 2019-08-25 RX ADMIN — MORPHINE SULFATE PRN MG: 2 INJECTION, SOLUTION INTRAMUSCULAR; INTRAVENOUS at 10:34

## 2019-08-25 RX ADMIN — PIPERACILLIN SODIUM,TAZOBACTAM SODIUM SCH MLS/HR: 3; .375 INJECTION, POWDER, FOR SOLUTION INTRAVENOUS at 10:13

## 2019-08-25 NOTE — PN
Progress Note, Physician


Chief Complaint: 





kayexelate given by overnight team for K 6.6





patient with hyperkalemia and hypernatremia which are both improving with D5.  

renal consulted.   ok to give lasix 20mg x 1 now both for K and for increased 

congestion of upper anterior lobes.





for a swallow evaluation once more stable.  patient had a recent swallow 

evaluation at [facility] which recommended pureed diet with nectar thick fluids. 








History of Present Illness: 





Patient is a 59 year old female (resident of [facility]) with a PMHx 

significant for profound mental retardation, cerebral palsy, congenital 

quadriplegia, severe scoliosis, seizure disorder, and GERD.  Transferred from 

[place] to [facility] for further management of SBO, sepsis and tachycardia.  








- Current Medication List


Current Medications: 


Active Medications





Acetaminophen (Tylenol Suppository -)  650 mg SD Q6H PRN


   PRN Reason: FEVER


Albuterol Sulfate (Ventolin 0.083% Nebulizer Soln -)  1 amp NEB Q4H PRN


   PRN Reason: SHORTNESS OF BREATH


Piperacillin Sod/Tazobactam (Sod 3.375 gm/ Dextrose)  50 mls @ 100 mls/hr IVPB 

Q8H-IV ROSE MARIE; Protocol


   Last Admin: 08/25/19 10:13 Dose:  100 mls/hr


Dextrose (D5w -)  1,000 mls @ 75 mls/hr IV ASDIR ROSE MARIE


   Last Admin: 08/24/19 21:51 Dose:  75 mls/hr


Levetiracetam (Keppra Injection -)  500 mg IVPB BID ROSE MARIE


   Last Admin: 08/25/19 10:13 Dose:  500 mg


Lorazepam (Ativan Injection -)  0.25 mg IVPB DAILY PRN


   PRN Reason: ANXIETY


Metoprolol Tartrate (Lopressor Injection -)  5 mg IVPUSH Q4H PRN


   PRN Reason: TACHYCARDIA


   Last Admin: 08/25/19 06:54 Dose:  5 mg


Morphine Sulfate (Morphine Sulfate)  0.5 mg IVPB Q8H PRN


   PRN Reason: PAIN LEVEL 6-10


   Last Admin: 08/25/19 10:34 Dose:  0.5 mg











- Objective


Vital Signs: 


 Vital Signs











Temperature  98 F   08/25/19 06:27


 


Pulse Rate  113 H  08/25/19 08:59


 


Respiratory Rate  20   08/25/19 08:48


 


Blood Pressure  113/62   08/25/19 06:54


 


O2 Sat by Pulse Oximetry (%)  98   08/25/19 08:59











Constitutional: Yes: No Distress, Calm


Eyes: Yes: Conjunctiva Clear


HENT: Yes: Atraumatic


Neck: Yes: Supple


Cardiovascular: Yes: Tachycardia


Respiratory: Yes: On Nasal O2, Rhonchi (rhonchi on anterior upper lobes), SOB


Gastrointestinal: Yes: Distention


...Rectal Exam: Yes: Deferred


Musculoskeletal: Yes: Other (contracted)


Extremities: Yes: Other (contracted)


Edema: No


Integumentary: Yes: WNL


Neurological: Yes: Other (history of profound MR)


Labs: 


 CBC, BMP





 08/24/19 20:00 





 08/25/19 11:23 





 INR, PTT











INR  1.00  (0.82-1.09)   08/22/19  17:22    














- ....Imaging


Chest X-ray: Report Reviewed


EKG: Report Reviewed





Problem List





- Problems


(1) Sepsis


Assessment/Plan: 


patient with elevated wbc, tachycardia, respiratory rate > 24, chest xray shows 

possible early left infiltrate


already on zosyn


monitor respiratory status.  add 2 liters of nasal cannula.


blood cultures ordered


will give lasix 20mg x 1 as she has rhonchi of upper lobes


monitor output


Code(s): A41.9 - SEPSIS, UNSPECIFIED ORGANISM   





(2) SBO (small bowel obstruction)


Assessment/Plan: 





NGT to low intermittent suction


abd xray with sbo


abdomen distended, absent bowel sounds


monitor drain output


surgery following


Code(s): K56.609 - UNSP INTESTNL OBST, UNSP AS TO PARTIAL VERSUS COMPLETE OBST 

  





(3) Aspiration into airway


Assessment/Plan: 


chest xray shows left lower lobe infiltrate.  on zosyn.


support with 2 liters of nasal cannula


monitor oxygen levels.


Code(s): T17.908A - UNSP FB IN RESP TRACT, PART UNSP CAUSING OTH INJURY, INIT   





(4) Decreased oral intake


Assessment/Plan: 


NPO


start clinimax if prolonged NPO  


Code(s): R63.8 - OTHER SYMPTOMS AND SIGNS CONCERNING FOOD AND FLUID INTAKE   





(5) Seizure


Assessment/Plan: 


on keppra.  


on tegratol in [place], continue keppra iv bid





Code(s): R56.9 - UNSPECIFIED CONVULSIONS   





(6) Hypernatremia


Assessment/Plan: 


renal consulted for electrolyte imbalance.


Code(s): E87.0 - HYPEROSMOLALITY AND HYPERNATREMIA   





(7) Hyperkalemia


Assessment/Plan: 


given kayexelate overnight


K 5.8>5.6 today


monitor on tele


will give lasix 20 now


Code(s): E87.5 - HYPERKALEMIA   





(8) Profound mental handicap


Assessment/Plan: 


supportive care.





Code(s): F73 - PROFOUND INTELLECTUAL DISABILITIES   





(9) Tachycardia


Assessment/Plan: 


tachycardia likely in the setting of abdominal pain, anxiety, NGT


trop neg x 1, no ischemia on EKG


on morphine, lopressor iv


apply 2 liters of nasal cannula for congestion.





Code(s): R00.0 - TACHYCARDIA, UNSPECIFIED   





(10) Left pulmonary infiltrate on CXR


Assessment/Plan: 


apply oxygen 2 liters.  patient noted to be congested and breathing rate 24s


on zosyn


give lasix now


Code(s): R91.8 - OTHER NONSPECIFIC ABNORMAL FINDING OF LUNG FIELD   





(11) Prophylactic measure


Assessment/Plan: 


fen


npo


on d5 @ 75


monitor electrolytes





full code





Code(s): Z29.9 - ENCOUNTER FOR PROPHYLACTIC MEASURES, UNSPECIFIED   





Visit type





- Emergency Visit


Emergency Visit: Yes


ED Registration Date: 08/22/19


Care time: The patient presented to the Emergency Department on the above date 

and was hospitalized for further evaluation of their emergent condition.





- New Patient


This patient is new to me today: No





- Critical Care


Critical Care patient: No





- Discharge Referral


Referred to [facility] No

## 2019-08-25 NOTE — PN
Progress Note (short form)





- Note


Progress Note: 


s: unable to obtain hx, nonverbal.  Current Medications





Acetaminophen (Tylenol Suppository -)  650 mg VT Q6H PRN


   PRN Reason: FEVER


Albuterol Sulfate (Ventolin 0.083% Nebulizer Soln -)  1 amp NEB Q4H PRN


   PRN Reason: SHORTNESS OF BREATH


Piperacillin Sod/Tazobactam (Sod 3.375 gm/ Dextrose)  50 mls @ 100 mls/hr IVPB 

Q8H-IV [facility]; Protocol


   Last Admin: 08/25/19 10:13 Dose:  100 mls/hr


Dextrose (D5w -)  1,000 mls @ 75 mls/hr IV ASDIR [facility]


   Last Admin: 08/24/19 21:51 Dose:  75 mls/hr


Levetiracetam (Keppra Injection -)  500 mg IVPB BID [facility]


   Last Admin: 08/25/19 10:13 Dose:  500 mg


Lorazepam (Ativan Injection -)  0.25 mg IVPB DAILY PRN


   PRN Reason: ANXIETY


Metoprolol Tartrate (Lopressor Injection -)  5 mg IVPUSH Q4H PRN


   PRN Reason: TACHYCARDIA


   Last Admin: 08/25/19 06:54 Dose:  5 mg


Morphine Sulfate (Morphine Sulfate)  0.5 mg IVPB Q8H PRN


   PRN Reason: PAIN LEVEL 6-10


   Last Admin: 08/25/19 10:34 Dose:  0.5 mg








  Vital Signs











 Period  Temp  Pulse  Resp  BP Sys/Carlson  Pulse Ox


 


 Last 24 Hr  97.4 F-98.7 F  110-151  20-22  /62-96  96-99











Constitutional: Yes: No Distress


HENT: Yes: Atraumatic


Neck: Yes: Supple


Respiratory: Yes: Regular, CTA Bilaterally


Gastrointestinal: Yes: Soft


Cardiovascular: Yes: Tachycardia


JVD: No


Heart Sounds: Yes: S1, S2


Edema: No


Peripheral Pulses WNL: Yes


Integumentary: No: Jaundice


Neurological: Yes: Alert


Psychiatric: No: Agitated





Assessment/Plan





EKG: sinus tachycardia, nonspecific ST/T wave changes





tele: sinus








tachycardia


- sinus, monitoring on tele


- echo ordered


- likely in setting of underlying pain, anxiety, SBO - manage per primary, HR 

improving


- trop neg x 1, no ischemia on EKG - unlikely ACS





SBO


- manage per surgery





seizure disorder


- manage per primary





GERD


-holding meds, NPO

## 2019-08-25 NOTE — PN
Progress Note (short form)





- Note


Progress Note: 





Attending Surgeon





Seen in f/u; w/o flatus and/or BM





abdo-soft; less distended; ? baseline ?





 cc





IMP: SBO





PLAN: Continue present tx.; repeat imaging in AM.





Wyatt Alfaro MD FACS

## 2019-08-25 NOTE — CONSULT
Consult


Consult Specialty:: Nephrology


Reason for Consultation:: hyperkalemia





- History of Present Illness


Chief Complaint: sent in for constipation


History of Present Illness: 





Pt is a 59 year old female with pmhx of devleopemental delay, epilepsy, gerd 

and scoliosis who presents to the ER initially for constipation. She was found 

to have SBO. I was called to evaluate patient for multiple electrolyte 

abnormalities including hypernatremia and hyperkalemia. She is non verbal and 

unable to give history. Chart was reviewed. Family are at bedside and care was 

discussed with them. 





- History Source


History Provided By: Family Member, Medical Record


Limitations to Obtaining History: Clinical Condition





- Past Medical History


CNS: Yes: Seizure, Other (developemental delay)


Gastrointestinal: Yes: GERD





- Alcohol/Substance Use


Hx Alcohol Use: No





- Smoking History


Smoking history: Never smoked


Have you smoked in the past 12 months: No


Aproximately how many cigarettes per day: 0





Home Medications





- Allergies


Allergies/Adverse Reactions: 


 Allergies











Allergy/AdvReac Type Severity Reaction Status Date / Time


 


phenytoin sodium AdvReac Mild  Verified 08/22/19 18:36





[From Dilantin]     


 


phenytoin sodium extended AdvReac Mild  Verified 08/22/19 18:36





[From Dilantin]     


 


sellfish Allergy   Uncoded 08/22/19 18:36














- Home Medications


Home Medications: 


Ambulatory Orders





Carbamazepine Xr [Tegretol Xr -] 400 mg PO AM 07/24/14 


Polyethylene Glycol 3350 [Miralax 119 gm Btl -] 17 gm PO DAILY 07/24/14 


Albuterol 0.083% Nebulizer Sol [Ventolin 0.083%] 1 neb NEB Q4H PRN 08/22/19 


Calcium Carbonate/Vitamin D3 [Oyster Shell 500-Vit D3 200 Tb] 1 each PO BID 08/ 22/19 


Carbamazepine [Tegretol -] 200 mg PO HS 08/22/19 


Famotidine [Pepcid] 40 mg PO BID 08/22/19 


Fexofenadine HCl [Allegra Allergy] 30 mg PO BID 08/22/19 


Pedi Multivit 158/Iron/Vit K1 [Cerovite Jr] 1 each PO DAILY 08/22/19 


Simethicone 40 mg PO Q4H 08/22/19 


Wheat Dextrin [Benefiber] 1 each PO DAILY 08/22/19 











Family Disease History





- Family Disease History


Family History: Unable to Obtain





Review of Systems


Unable to obtain ROS, reason: pt non verbal





Physical Exam


Vital Signs: 


 Vital Signs











Temperature  98 F   08/25/19 06:27


 


Pulse Rate  113 H  08/25/19 08:59


 


Respiratory Rate  20   08/25/19 08:48


 


Blood Pressure  113/62   08/25/19 06:54


 


O2 Sat by Pulse Oximetry (%)  98   08/25/19 08:59











Constitutional: Yes: Calm


Eyes: Yes: Conjunctiva Clear


HENT: Yes: Atraumatic


Cardiovascular: Yes: S1, S2


Respiratory: Yes: CTA Bilaterally


Gastrointestinal: Yes: Soft, Other (ng tube in place)


Renal/: Yes: Incontinence


Musculoskeletal: Yes: Muscle Weakness


Edema: No


Neurological: Yes: Pre-Existing Deficit, Other (non verbal)


Labs: 


 CBC, BMP





 08/24/19 20:00 





 08/25/19 11:23 





 Laboratory Tests











  08/22/19 08/22/19 08/24/19





  17:22 17:22 20:00


 


WBC  16.0 H   7.0


 


Hgb  16.5 H   13.1


 


Sodium   143 


 


Potassium   


 


Chloride   


 


Carbon Dioxide   


 


Anion Gap   


 


BUN   














  08/24/19 08/25/19 08/25/19





  20:00 06:10 11:23


 


WBC   


 


Hgb   


 


Sodium  156 H  151 H  151 H


 


Potassium  6.6 H*  5.8 H  5.6 H


 


Chloride    120 H


 


Carbon Dioxide    25


 


Anion Gap    5 L


 


BUN    21.8 H














Imaging





- Results


Chest X-ray: Report Reviewed


X-ray: Report Reviewed





Problem List





- Problems


(1) Hyperkalemia


Code(s): E87.5 - HYPERKALEMIA   





(2) Hypernatremia


Code(s): E87.0 - HYPEROSMOLALITY AND HYPERNATREMIA   





(3) SBO (small bowel obstruction)


Code(s): K56.609 - UNSP INTESTNL OBST, UNSP AS TO PARTIAL VERSUS COMPLETE OBST 

  





(4) Seizure


Code(s): R56.9 - UNSPECIFIED CONVULSIONS   





Assessment/Plan





 Current Medications











Generic Name Dose Route Start Last Admin





  Trade Name Freq  PRN Reason Stop Dose Admin


 


Acetaminophen  650 mg  08/24/19 14:13  





  Tylenol Suppository -  WA   





  Q6H PRN   





  FEVER   





     





     





     


 


Albuterol Sulfate  1 amp  08/24/19 14:13  





  Ventolin 0.083% Nebulizer Soln -  NEB   





  Q4H PRN   





  SHORTNESS OF BREATH   





     





     





     


 


Piperacillin Sod/Tazobactam  50 mls @ 100 mls/hr  08/24/19 18:00  08/25/19 10:13





  Sod 3.375 gm/ Dextrose  IVPB   100 mls/hr





  Q8H-IV [facility]   Administration





     





     





  Protocol   





     


 


Dextrose  1,000 mls @ 75 mls/hr  08/24/19 21:45  08/24/19 21:51





  D5w -  IV   75 mls/hr





  ASDIR [facility]   Administration





     





     





     





     


 


Levetiracetam  500 mg  08/24/19 22:00  08/25/19 10:13





  Keppra Injection -  IVPB   500 mg





  BID [facility]   Administration





     





     





     





     


 


Lorazepam  0.25 mg  08/25/19 06:00  





  Ativan Injection -  IVPB   





  DAILY PRN   





  ANXIETY   





     





     





     


 


Metoprolol Tartrate  5 mg  08/24/19 15:49  08/25/19 06:54





  Lopressor Injection -  IVPUSH   5 mg





  Q4H PRN   Administration





  TACHYCARDIA   





     





     





     


 


Morphine Sulfate  0.5 mg  08/24/19 11:28  08/25/19 10:34





  Morphine Sulfate  IVPB   0.5 mg





  Q8H PRN   Administration





  PAIN LEVEL 6-10   





     





     





     








Impression


1. hypernatremia


2. hyperkalemia


3. SBO


4. epilepsy


5. developemental delay





Plan


- potassium is improving


- sodium is improving


- cont with d5w, this should help with potassium and sodium


- if she has signs of overload give 20 mg of lasix (will help with potassium)


- surgery input appreciated


- pt with ng ti suction, monitor output

## 2019-08-25 NOTE — PN
Progress Note, Physician


History of Present Illness: 





patient stable


no new issues


ng tube in place


still draining 





- Current Medication List


Current Medications: 


Active Medications





Acetaminophen (Tylenol Suppository -)  650 mg [place] Q6H PRN


   PRN Reason: FEVER


Albuterol Sulfate (Ventolin 0.083% Nebulizer Soln -)  1 amp NEB Q4H PRN


   PRN Reason: SHORTNESS OF BREATH


Piperacillin Sod/Tazobactam (Sod 3.375 gm/ Dextrose)  50 mls @ 100 mls/hr IVPB 

Q8H-IV [facility]; Protocol


   Last Admin: 08/25/19 10:13 Dose:  100 mls/hr


Dextrose (D5w -)  1,000 mls @ 75 mls/hr IV ASDIR [facility]


   Last Admin: 08/24/19 21:51 Dose:  75 mls/hr


Levetiracetam (Keppra Injection -)  500 mg IVPB BID [facility]


   Last Admin: 08/25/19 10:13 Dose:  500 mg


Lorazepam (Ativan Injection -)  0.25 mg IVPB DAILY PRN


   PRN Reason: ANXIETY


Metoprolol Tartrate (Lopressor Injection -)  5 mg IVPUSH Q4H PRN


   PRN Reason: TACHYCARDIA


   Last Admin: 08/25/19 06:54 Dose:  5 mg


Morphine Sulfate (Morphine Sulfate)  0.5 mg IVPB Q8H PRN


   PRN Reason: PAIN LEVEL 6-10


   Last Admin: 08/25/19 01:11 Dose:  0.5 mg











- Objective


Vital Signs: 


 Vital Signs











Temperature  98 F   08/25/19 06:27


 


Pulse Rate  113 H  08/25/19 08:59


 


Respiratory Rate  20   08/25/19 08:48


 


Blood Pressure  113/62   08/25/19 06:54


 


O2 Sat by Pulse Oximetry (%)  98   08/25/19 08:59











Constitutional: Yes: No Distress, Calm


Cardiovascular: Yes: S1, S2


Respiratory: Yes: Regular, Poor Air Entry


Gastrointestinal: Yes: Soft, Hypoactive Bowel Sounds, Other (ng in place)


Musculoskeletal: Yes: WNL


Extremities: Yes: Other


Neurological: Yes: Alert, Other


Labs: 


 CBC, BMP





 08/24/19 20:00 





 08/25/19 06:10 





 INR, PTT











INR  1.00  (0.82-1.09)   08/22/19  17:22    














Assessment/Plan





59 year-old female resident of [facility], with a PMH significant for 

profound mental retardation, cerebral palsy, congenital quadriplegia, severe 

scoliosis, seizure disorder, and GERD. Admitted for SBO.





SBO


Profound mental retardation


Cerebral palsy


Congenital quadriplegia


Severe scoliosis


Seizure disorder


GERD





plan


npo


hydration


ng suctioning


ct abx

## 2019-08-26 LAB
ALBUMIN SERPL-MCNC: 2 G/DL (ref 3.4–5)
ALP SERPL-CCNC: 84 U/L (ref 45–117)
ALT SERPL-CCNC: 10 U/L (ref 13–61)
ANION GAP SERPL CALC-SCNC: 9 MMOL/L (ref 8–16)
AST SERPL-CCNC: 13 U/L (ref 15–37)
BASOPHILS # BLD: 0.1 % (ref 0–2)
BILIRUB SERPL-MCNC: 0.4 MG/DL (ref 0.2–1)
BUN SERPL-MCNC: 19.5 MG/DL (ref 7–18)
CALCIUM SERPL-MCNC: 8.8 MG/DL (ref 8.5–10.1)
CHLORIDE SERPL-SCNC: 108 MMOL/L (ref 98–107)
CO2 SERPL-SCNC: 26 MMOL/L (ref 21–32)
CREAT SERPL-MCNC: 0.6 MG/DL (ref 0.55–1.3)
DEPRECATED RDW RBC AUTO: 14.7 % (ref 11.6–15.6)
EOSINOPHIL # BLD: 0 % (ref 0–4.5)
GLUCOSE SERPL-MCNC: 153 MG/DL (ref 74–106)
HCT VFR BLD CALC: 36.3 % (ref 32.4–45.2)
HGB BLD-MCNC: 11.8 GM/DL (ref 10.7–15.3)
LYMPHOCYTES # BLD: 12.5 % (ref 8–40)
MAGNESIUM SERPL-MCNC: 2.1 MG/DL (ref 1.8–2.4)
MCH RBC QN AUTO: 28.2 PG (ref 25.7–33.7)
MCHC RBC AUTO-ENTMCNC: 32.6 G/DL (ref 32–36)
MCV RBC: 86.4 FL (ref 80–96)
MONOCYTES # BLD AUTO: 10.5 % (ref 3.8–10.2)
NEUTROPHILS # BLD: 76.9 % (ref 42.8–82.8)
PLATELET # BLD AUTO: 165 K/MM3 (ref 134–434)
PMV BLD: 9.4 FL (ref 7.5–11.1)
POTASSIUM SERPLBLD-SCNC: 3.9 MMOL/L (ref 3.5–5.1)
PROT SERPL-MCNC: 5.3 G/DL (ref 6.4–8.2)
RBC # BLD AUTO: 4.2 M/MM3 (ref 3.6–5.2)
SODIUM SERPL-SCNC: 143 MMOL/L (ref 136–145)
WBC # BLD AUTO: 8 K/MM3 (ref 4–10)

## 2019-08-26 RX ADMIN — LEVETIRACETAM SCH MG: 100 INJECTION, SOLUTION, CONCENTRATE INTRAVENOUS at 11:10

## 2019-08-26 RX ADMIN — MORPHINE SULFATE PRN MG: 2 INJECTION, SOLUTION INTRAMUSCULAR; INTRAVENOUS at 13:30

## 2019-08-26 RX ADMIN — PIPERACILLIN SODIUM,TAZOBACTAM SODIUM SCH MLS/HR: 3; .375 INJECTION, POWDER, FOR SOLUTION INTRAVENOUS at 01:21

## 2019-08-26 RX ADMIN — PIPERACILLIN SODIUM,TAZOBACTAM SODIUM SCH MLS/HR: 3; .375 INJECTION, POWDER, FOR SOLUTION INTRAVENOUS at 17:43

## 2019-08-26 RX ADMIN — LEUCINE, PHENYLALANINE, LYSINE, METHIONINE, ISOLEUCINE, VALINE, HISTIDINE, THREONINE, TRYPTOPHAN, ALANINE, GLYCINE, ARGININE, PROLINE, SERINE, TYROSINE, DEXTROSE SCH MLS/HR: 311; 238; 247; 170; 255; 247; 204; 179; 77; 880; 438; 489; 289; 213; 17; 5 INJECTION INTRAVENOUS at 15:13

## 2019-08-26 RX ADMIN — MORPHINE SULFATE PRN MG: 2 INJECTION, SOLUTION INTRAMUSCULAR; INTRAVENOUS at 21:23

## 2019-08-26 RX ADMIN — PIPERACILLIN SODIUM,TAZOBACTAM SODIUM SCH MLS/HR: 3; .375 INJECTION, POWDER, FOR SOLUTION INTRAVENOUS at 11:10

## 2019-08-26 RX ADMIN — MORPHINE SULFATE PRN MG: 2 INJECTION, SOLUTION INTRAMUSCULAR; INTRAVENOUS at 04:30

## 2019-08-26 RX ADMIN — LEVETIRACETAM SCH MG: 100 INJECTION, SOLUTION, CONCENTRATE INTRAVENOUS at 21:23

## 2019-08-26 NOTE — ECHO
______________________________________________________________________________



Name: MIGUEL ANGEL MICHELLE                                   Exam:Adult Echocardiogram

MRN: [id number]         Study Date: 2019 12:58 PM

Age: 59 yrs

______________________________________________________________________________



Reason For Study: CHF

Height: 60 in        Weight: 62 lb        BSA: 1.1 m2



______________________________________________________________________________



MMode/2D Measurements & Calculations

IVSd: 0.76 cm                                Ao root diam: 2.9 cm

LVIDd: 2.8 cm                                LA dimension: 2.7 cm

LVIDs: 1.9 cm

LVPWd: 0.69 cm

IVSs: 0.94 cm



______________________________________________

LVPWs: 0.73 cm                               EDV(Teich): 28.6 ml

                                             ESV(Teich): 11.4 ml



Doppler Measurements & Calculations

MV E max hubert: 49.9 cm/sec                                  Ao V2 max: 124.9 cm/sec

MV A max hubert: 91.1 cm/sec                                  Ao max P.2 mmHg

MV E/A: 0.55                                               Ao V2 mean: 88.8 cm/sec

                                                           Ao mean PG: 3.5 mmHg

                                                           Ao V2 VTI: 18.1 cm



____________________________________________________________

Med Peak E' Hubert: 2.7 cm/sec

Med E/e': 18.2

Lat Peak E' Hubert: 2.6 cm/sec

Lat E/e': 18.9





______________________________________________________________________________

Procedure

A complete two-dimensional transthoracic echocardiogram was performed (2D, M-mode, Doppler and color 
flow

Doppler). Technically limited study.

Left Ventricle

The left ventricle is normal in size. Left ventricular systolic function is normal. Ejection Fraction
 = 60-

65%. LV diastology reveals E/A reversal with elevated filling pressure (E/E' 18) c/w diastolic dysfun
ction. No

regional wall motion abnormalities noted.

Right Ventricle

The right ventricle is not well visualized.

Atria

The left atrial size is normal. Right atrial size is normal.

Mitral Valve

There is mild mitral annular calcification. There is mild mitral regurgitation.

Tricuspid Valve

The tricuspid valve is normal in structure and function. No tricuspid regurgitation.

Aortic Valve

The aortic valve is normal in structure and function. No aortic regurgitation is present.

Pulmonic Valve

The pulmonic valve is not well visualized.

Great Vessels

The aortic root is normal size.

Pericardium/Pleura

There is no pericardial effusion.



______________________________________________________________________________



Interpretation Summary

Technically limited study

The left ventricle is normal in size.

Left ventricular systolic function is normal.

No regional wall motion abnormalities noted.

Ejection Fraction = 60-65%.

LV diastology reveals E/A reversal with elevated filling pressure (E/E' 18) c/w diastolic dysfunction


The right ventricle is not well visualized.

The left atrial size is normal.

Right atrial size is normal.

There is mild mitral annular calcification.

There is mild mitral regurgitation.

There is no pericardial effusion.



Previous study is not available for comparison







Ever Ayala MD 2019 02:07 PM

## 2019-08-26 NOTE — CONSULT
Admitting History and Physical





- Primary Care Physician


PCP: Corby Stringer





- Admission


History of Present Illness: 





59 year old female with pmhx of devleopemental delay, epilepsy, gerd and 

scoliosis admitted with SBO.





The NGT has been maintained on low, continuous wall suction with low output 

this morning, draining green liquid.





This is my first consult with this pt.





At [place], patient is on a pured diet with honey thick liquids, medication 

taken with food.


She receives power pudding at breakfast, Weight gain supplement at breakfast 

and dinner, bran cereal three times a week, prune juice twice daily. She is 

supplemented with Ensure pudding twice a day and power pudding once daily. 2 

jose hn is given for meal refusal.


History Source: Family Member, Medical Record





- Past Medical History


CNS: Yes: Seizure, Other (developemental delay)


Gastrointestinal: Yes: GERD





- Advance Directives


Advance Directives: Yes: Health Care Proxy





- Smoking History


Smoking history: Never smoked


Have you smoked in the past 12 months: No


Aproximately how many cigarettes per day: 0





- Alcohol/Substance Use


Hx Alcohol Use: No





History





- Admission


Reason For Visit: SMALL BOWEL OBSTRUCTION





- Diagnostics


Modified Barium Swallow: Report Reviewed (Modified barium swallow performed at 

[facility] on  July 25, 2019. In summary, it was inconsistent by 

labial seal, reduced lingual range of motion, pooling in the vallecula before 

the swallow. No aspiration.slow oral transport. Delayed swallow. No aspiration. 

Pure and nectar thick liquid were recommended)





Speech Evaluation





- Communication


Communication: Yes: Non-Communicable


Oral Expression Ability: Yes: Non-Verbal, Non-Vocal





- Swallow Evaluation/Bedside Assessment


Current Nutritional Intake: NPO, NG Tube (suction)





Recommendations





- Speech Evaluation, Impression/Plan


Impression: Case reviewed with PNP as well as pt's family. Pt is receiving NG 

suction at this time. Swallowing assessment will be performed once medically 

cleared.

## 2019-08-26 NOTE — PN
Progress Note (short form)





- Note


Progress Note: 





Surgery





Patient being followed for SBO seen and examined at bedside with family and her 

Aide from the facility at bedside. Patient's sister states she appears to be 

more uncomfortable this morning.  The patient's sister states she has been 

relatively comfortable throughout the weekend and denies any new symproms. The 

NGT has been maintained on low, continuous wall suction with low output this 

morning. 





 Vital Signs











Temp  97.5 F L  08/26/19 09:31


 


Pulse  110 H  08/26/19 09:31


 


Resp  24 H  08/26/19 09:31


 


BP  112/73   08/26/19 09:31


 


Pulse Ox  100   08/25/19 22:00








 Intake & Output











 08/25/19 08/26/19 08/26/19





 23:59 11:59 23:59


 


Intake Total 1050 900 


 


Output Total 400  


 


Balance 650 900 


 


Intake:   


 


   900 


 


    D5w - 1,000 ml @ 75 mls/ 900 900 





    hr IV ASDIR ROSE MARIE Rx#:   





    [id number]   


 


  IVPB 150  


 


  Oral 0 0 


 


Output:   


 


  Gastric Drainage 400  


 


Other:   


 


  Voiding Method Incontinent Incontinent 


 


  # Unmeasured Voids   


 


    Void 1 1 


 


  Bowel Movement No  











 CBC, BMP





 08/26/19 06:15 





 08/26/19 06:15 











PE:


Patient resting comfortably but appears restless this morning. 


Unlabored resp on RA


Extremities contracted x4


Abd: Soft, nontender, mild distention,  no guarding, no rebound.  Healed 

midline surgical scar and lateral scar. 





ABD x-ray pending

















Problem List





- Problems


(1) SBO (small bowel obstruction)


Assessment/Plan: 


-NPO/NGT- maintained at low continuous suction


-IVF


- serial abd exams 


- f/u serial x rays


-Surgery to follow





Evaluation and plan discussed with Dr Alfaro











Code(s): K56.609 - UNSP INTESTNL OBST, UNSP AS TO PARTIAL VERSUS COMPLETE OBST

## 2019-08-26 NOTE — PN
Progress Note, Physician


Chief Complaint: 





nonverbal





TELE: NSR, sinus tach


History of Present Illness: 





NGT 





- Current Medication List


Current Medications: 


Active Medications





Acetaminophen (Tylenol Suppository -)  650 mg MA Q6H PRN


   PRN Reason: FEVER


Albuterol Sulfate (Ventolin 0.083% Nebulizer Soln -)  1 amp NEB Q4H PRN


   PRN Reason: SHORTNESS OF BREATH


Piperacillin Sod/Tazobactam (Sod 3.375 gm/ Dextrose)  50 mls @ 100 mls/hr IVPB 

Q8H-IV ROSE MARIE; Protocol


   Last Admin: 08/26/19 01:21 Dose:  100 mls/hr


Dextrose (D5w -)  1,000 mls @ 75 mls/hr IV ASDIR ROSE MARIE


   Last Admin: 08/25/19 23:33 Dose:  75 mls/hr


Levetiracetam (Keppra Injection -)  500 mg IVPB BID ROSE MARIE


   Last Admin: 08/25/19 23:33 Dose:  500 mg


Lorazepam (Ativan Injection -)  0.25 mg IVPB DAILY PRN


   PRN Reason: ANXIETY


Metoprolol Tartrate (Lopressor Injection -)  5 mg IVPUSH Q4H PRN


   PRN Reason: TACHYCARDIA


   Last Admin: 08/25/19 06:54 Dose:  5 mg


Morphine Sulfate (Morphine Sulfate)  0.5 mg IVPB Q8H PRN


   PRN Reason: PAIN LEVEL 6-10


   Last Admin: 08/26/19 04:30 Dose:  0.5 mg











- Objective


Vital Signs: 


 Vital Signs











Temperature  97.5 F L  08/26/19 09:31


 


Pulse Rate  110 H  08/26/19 09:31


 


Respiratory Rate  24 H  08/26/19 09:31


 


Blood Pressure  112/73   08/26/19 09:31


 


O2 Sat by Pulse Oximetry (%)  100   08/25/19 22:00











Cardiovascular: Yes: Regular Rate and Rhythm


Respiratory: Yes: Rhonchi


Gastrointestinal: Yes: Soft


Edema: No


Labs: 


 CBC, BMP





 08/26/19 06:15 





 08/26/19 06:15 





 INR, PTT











INR  1.00  (0.82-1.09)   08/22/19  17:22    








Microbiology





08/24/19 20:00   Blood - Peripheral Venous   Blood Culture - Preliminary


                              NO GROWTH OBTAINED AFTER 24 HOURS, INCUBATION TO 

CONTINUE


                              FOR 4 DAYS.





 Laboratory Tests











  08/26/19 08/26/19





  06:15 06:15


 


WBC  8.0 


 


Hgb  11.8 


 


Plt Count  165  D 


 


Sodium   143


 


Potassium   3.9


 


BUN   19.5 H


 


Creatinine   0.6


 


Total Bilirubin   0.4


 


AST   13 L


 


ALT   10 L


 


Alkaline Phosphatase   84














- ....Imaging


EKG: Image Reviewed





Assessment/Plan





DATA:








tele: sinus, sinus tach











IMP:


Sinus tachycardia:


- sinus, monitoring on tele


- f/u echo


- likely in setting of underlying pain, anxiety, SBO - manage per primary, HR 

improving


- trop neg x 1, no ischemia on EKG - unlikely ACS





SBO:


- manage per surgery, NGT in place





Seizure disorder:


- manage per primary





GERD:


-holding meds, NPO

## 2019-08-26 NOTE — PN
Progress Note, Physician


Chief Complaint: 





patient with hyperkalemia and hypernatremia which are both now resolved.  renal 

consulted.   





for a swallow evaluation once more stable.  patient had a recent swallow 

evaluation at [facility] which recommended pureed diet with nectar thick fluids. 








History of Present Illness: 





Patient is a 59 year old female (resident of [facility]) with a PMHx 

significant for profound mental retardation, cerebral palsy, congenital 

quadriplegia, severe scoliosis, seizure disorder, and GERD.  Transferred from 

[place] to [facility] for further management of SBO, sepsis and tachycardia.  








- Current Medication List


Current Medications: 


Active Medications





Acetaminophen (Tylenol Suppository -)  650 mg WI Q6H PRN


   PRN Reason: FEVER


Albuterol Sulfate (Ventolin 0.083% Nebulizer Soln -)  1 amp NEB Q4H PRN


   PRN Reason: SHORTNESS OF BREATH


Piperacillin Sod/Tazobactam (Sod 3.375 gm/ Dextrose)  50 mls @ 100 mls/hr IVPB 

Q8H-IV [facility]; Protocol


   Last Admin: 08/26/19 11:10 Dose:  100 mls/hr


Amino Acids (Clinimix -)  1,000 mls @ 84 mls/hr IV Q12H [facility]


Levetiracetam (Keppra Injection -)  500 mg IVPB BID [facility]


   Last Admin: 08/26/19 11:10 Dose:  500 mg


Lorazepam (Ativan Injection -)  0.25 mg IVPB DAILY PRN


   PRN Reason: ANXIETY


Metoprolol Tartrate (Lopressor Injection -)  5 mg IVPUSH Q4H PRN


   PRN Reason: TACHYCARDIA


   Last Admin: 08/25/19 06:54 Dose:  5 mg


Morphine Sulfate (Morphine Sulfate)  0.5 mg IVPB Q8H PRN


   PRN Reason: PAIN LEVEL 6-10


   Last Admin: 08/26/19 04:30 Dose:  0.5 mg











- Objective


Vital Signs: 


 Vital Signs











Temperature  97.5 F L  08/26/19 09:31


 


Pulse Rate  110 H  08/26/19 09:31


 


Respiratory Rate  24 H  08/26/19 09:31


 


Blood Pressure  112/73   08/26/19 09:31


 


O2 Sat by Pulse Oximetry (%)  100   08/25/19 22:00











Constitutional: Yes: Calm


Eyes: Yes: WNL


HENT: Yes: Atraumatic


Neck: Yes: Supple


Cardiovascular: Yes: Tachycardia


Respiratory: Yes: Rales


Gastrointestinal: Yes: Distention


...Rectal Exam: Yes: Deferred


Genitourinary: Yes: WNL


Labs: 


 CBC, BMP





 08/26/19 06:15 





 08/26/19 06:15 





 INR, PTT











INR  1.00  (0.82-1.09)   08/22/19  17:22    














Problem List





- Problems


(1) Sepsis


Assessment/Plan: 


patient with elevated wbc, tachycardia, respiratory rate > 24, chest xray shows 

possible early left infiltrate


On Zosyn.  WBC improved on Zosyn, tachycardia improving.  on 2 liters nasal 

cannula.  


monitor respiratory status.  added 2 liters of nasal cannula.


blood cultures ordered and pending.  ID following


Rhonchi of upper lobes improving.  Given lasix 20 ivpb yesterday with good 

response.


monitor output


Code(s): A41.9 - SEPSIS, UNSPECIFIED ORGANISM   





(2) SBO (small bowel obstruction)


Assessment/Plan: 





NGT to low intermittent suction


abd xray with sbo


abdomen distended, absent bowel sounds


monitor drain output


surgery following, notes appreciated


Code(s): K56.609 - UNSP INTESTNL OBST, UNSP AS TO PARTIAL VERSUS COMPLETE OBST 

  





(3) Aspiration into airway


Assessment/Plan: 


chest xray shows left lower lobe infiltrate.  on zosyn.


support with 2 liters of nasal cannula


monitor oxygen levels and titrate off oxygen as tolerated.  not home oxygen 

dependent at home. 


Code(s): T17.908A - UNSP FB IN RESP TRACT, PART UNSP CAUSING OTH INJURY, INIT   





(4) Decreased oral intake


Assessment/Plan: 


NPO.  day #4 of NPO, will start Clinimax for nutrition.  before she starts 

mouth feeds, will need a swallow evaluation.  speech and swallow consulted and 

plan of care discussed.   


Code(s): R63.8 - OTHER SYMPTOMS AND SIGNS CONCERNING FOOD AND FLUID INTAKE   





(5) Seizure


Assessment/Plan: 


on keppra.  


on tegratol in [place], continue keppra iv bid.  once no longer npo, can restart 

tegretol. 





Code(s): R56.9 - UNSPECIFIED CONVULSIONS   





(6) Hypernatremia


Assessment/Plan: 


renal consulted for electrolyte imbalance.  recommendations appreciated. 


Code(s): E87.0 - HYPEROSMOLALITY AND HYPERNATREMIA   





(7) Hyperkalemia


Assessment/Plan: 


resolved.  monitor K daily


Code(s): E87.5 - HYPERKALEMIA   





(8) Profound mental handicap


Assessment/Plan: 


supportive care.





Code(s): F73 - PROFOUND INTELLECTUAL DISABILITIES   





(9) Tachycardia


Assessment/Plan: 


tachycardia likely in the setting of abdominal pain, anxiety, NGT- improving. 


trop neg x 1, no ischemia on EKG


on morphine, lopressor iv


apply 2 liters of nasal cannula for congestion.





Code(s): R00.0 - TACHYCARDIA, UNSPECIFIED   





(10) Left pulmonary infiltrate on CXR


Assessment/Plan: 


apply oxygen 2 liters.  patient noted to be congested and breathing rate 24s


on zosyn


responded well to lasix


Code(s): R91.8 - OTHER NONSPECIFIC ABNORMAL FINDING OF LUNG FIELD   





(11) Prophylactic measure


Assessment/Plan: 


fen


npo


on clinimax


monitor electrolytes





full code





Code(s): Z29.9 - ENCOUNTER FOR PROPHYLACTIC MEASURES, UNSPECIFIED   





Visit type





- Emergency Visit


Emergency Visit: Yes


ED Registration Date: 08/22/19


Care time: The patient presented to the Emergency Department on the above date 

and was hospitalized for further evaluation of their emergent condition.





- New Patient


This patient is new to me today: No





- Critical Care


Critical Care patient: No





- Discharge Referral


Referred to [facility] No

## 2019-08-26 NOTE — PN
Progress Note, Physician


History of Present Illness: 





patient stable


no new issues


ng tube in place


still draining 





- Current Medication List


Current Medications: 


Active Medications





Acetaminophen (Tylenol Suppository -)  650 mg NV Q6H PRN


   PRN Reason: FEVER


Albuterol Sulfate (Ventolin 0.083% Nebulizer Soln -)  1 amp NEB Q4H PRN


   PRN Reason: SHORTNESS OF BREATH


Piperacillin Sod/Tazobactam (Sod 3.375 gm/ Dextrose)  50 mls @ 100 mls/hr IVPB 

Q8H-IV [facility]; Protocol


   Last Admin: 08/26/19 01:21 Dose:  100 mls/hr


Dextrose (D5w -)  1,000 mls @ 75 mls/hr IV ASDIR [facility]


   Last Admin: 08/25/19 23:33 Dose:  75 mls/hr


Levetiracetam (Keppra Injection -)  500 mg IVPB BID [facility]


   Last Admin: 08/25/19 23:33 Dose:  500 mg


Lorazepam (Ativan Injection -)  0.25 mg IVPB DAILY PRN


   PRN Reason: ANXIETY


Metoprolol Tartrate (Lopressor Injection -)  5 mg IVPUSH Q4H PRN


   PRN Reason: TACHYCARDIA


   Last Admin: 08/25/19 06:54 Dose:  5 mg


Morphine Sulfate (Morphine Sulfate)  0.5 mg IVPB Q8H PRN


   PRN Reason: PAIN LEVEL 6-10


   Last Admin: 08/26/19 04:30 Dose:  0.5 mg











- Objective


Vital Signs: 


 Vital Signs











Temperature  97.5 F L  08/26/19 09:31


 


Pulse Rate  110 H  08/26/19 09:31


 


Respiratory Rate  24 H  08/26/19 09:31


 


Blood Pressure  112/73   08/26/19 09:31


 


O2 Sat by Pulse Oximetry (%)  100   08/25/19 22:00











Constitutional: Yes: No Distress, Calm


Cardiovascular: Yes: Regular Rate and Rhythm


Respiratory: Yes: Regular, CTA Bilaterally


Gastrointestinal: Yes: Normal Bowel Sounds, Soft


Musculoskeletal: Yes: WNL


Extremities: Yes: WNL


Neurological: Yes: Alert, Oriented


Psychiatric: Yes: Alert, Oriented


Labs: 


 CBC, BMP





 08/26/19 06:15 





 08/26/19 06:15 





 INR, PTT











INR  1.00  (0.82-1.09)   08/22/19  17:22    














Assessment/Plan





59 year-old female resident of [facility], with a PMH significant for 

profound mental retardation, cerebral palsy, congenital quadriplegia, severe 

scoliosis, seizure disorder, and GERD. Admitted for SBO.





SBO


Profound mental retardation


Cerebral palsy


Congenital quadriplegia


Severe scoliosis


Seizure disorder


GERD





plan


npo


hydration


ng suctioning


ct abx

## 2019-08-26 NOTE — PN
Progress Note, Physician


History of Present Illness: 





Pt seen and examined at bedside. She appears comfortable. 





- Current Medication List


Current Medications: 


Active Medications





Acetaminophen (Tylenol Suppository -)  650 mg LA Q6H PRN


   PRN Reason: FEVER


Albuterol Sulfate (Ventolin 0.083% Nebulizer Soln -)  1 amp NEB Q4H PRN


   PRN Reason: SHORTNESS OF BREATH


Piperacillin Sod/Tazobactam (Sod 3.375 gm/ Dextrose)  50 mls @ 100 mls/hr IVPB 

Q8H-IV [facility]; Protocol


   Last Admin: 08/26/19 11:10 Dose:  100 mls/hr


Dextrose (D5w -)  1,000 mls @ 75 mls/hr IV ASDIR [facility]


   Last Admin: 08/25/19 23:33 Dose:  75 mls/hr


Levetiracetam (Keppra Injection -)  500 mg IVPB BID [facility]


   Last Admin: 08/26/19 11:10 Dose:  500 mg


Lorazepam (Ativan Injection -)  0.25 mg IVPB DAILY PRN


   PRN Reason: ANXIETY


Metoprolol Tartrate (Lopressor Injection -)  5 mg IVPUSH Q4H PRN


   PRN Reason: TACHYCARDIA


   Last Admin: 08/25/19 06:54 Dose:  5 mg


Morphine Sulfate (Morphine Sulfate)  0.5 mg IVPB Q8H PRN


   PRN Reason: PAIN LEVEL 6-10


   Last Admin: 08/26/19 04:30 Dose:  0.5 mg











- Objective


Vital Signs: 


 Vital Signs











Temperature  97.5 F L  08/26/19 09:31


 


Pulse Rate  110 H  08/26/19 09:31


 


Respiratory Rate  24 H  08/26/19 09:31


 


Blood Pressure  112/73   08/26/19 09:31


 


O2 Sat by Pulse Oximetry (%)  100   08/25/19 22:00











Constitutional: Yes: Calm


Eyes: Yes: Conjunctiva Clear


HENT: Yes: Atraumatic


Cardiovascular: Yes: S1, S2


Respiratory: Yes: CTA Bilaterally


Gastrointestinal: Yes: Soft, Other (ngt)


Genitourinary: Yes: Incontinence


Musculoskeletal: Yes: Muscle Weakness, Other (contracted)


Edema: No


Neurological: Yes: Pre-Existing Deficit


Labs: 


 CBC, BMP





 08/26/19 06:15 





 08/26/19 06:15 





 INR, PTT











INR  1.00  (0.82-1.09)   08/22/19  17:22    














Problem List





- Problems


(1) Hyperkalemia


Code(s): E87.5 - HYPERKALEMIA   





(2) Hypernatremia


Code(s): E87.0 - HYPEROSMOLALITY AND HYPERNATREMIA   





(3) SBO (small bowel obstruction)


Code(s): K56.609 - UNSP INTESTNL OBST, UNSP AS TO PARTIAL VERSUS COMPLETE OBST 

  





(4) Seizure


Code(s): R56.9 - UNSPECIFIED CONVULSIONS   





Assessment/Plan


 Current Medications











Generic Name Dose Route Start Last Admin





  Trade Name Freq  PRN Reason Stop Dose Admin


 


Acetaminophen  650 mg  08/24/19 14:13  





  Tylenol Suppository -  [place]   





  Q6H PRN   





  FEVER   





     





     





     


 


Albuterol Sulfate  1 amp  08/24/19 14:13  





  Ventolin 0.083% Nebulizer Soln -  NEB   





  Q4H PRN   





  SHORTNESS OF BREATH   





     





     





     


 


Piperacillin Sod/Tazobactam  50 mls @ 100 mls/hr  08/24/19 18:00  08/26/19 11:10





  Sod 3.375 gm/ Dextrose  IVPB   100 mls/hr





  Q8H-IV [facility]   Administration





     





     





  Protocol   





     


 


Dextrose  1,000 mls @ 75 mls/hr  08/24/19 21:45  08/25/19 23:33





  D5w -  IV   75 mls/hr





  ASDIR [facility]   Administration





     





     





     





     


 


Levetiracetam  500 mg  08/24/19 22:00  08/26/19 11:10





  Keppra Injection -  IVPB   500 mg





  BID [facility]   Administration





     





     





     





     


 


Lorazepam  0.25 mg  08/25/19 06:00  





  Ativan Injection -  IVPB   





  DAILY PRN   





  ANXIETY   





     





     





     


 


Metoprolol Tartrate  5 mg  08/24/19 15:49  08/25/19 06:54





  Lopressor Injection -  IVPUSH   5 mg





  Q4H PRN   Administration





  TACHYCARDIA   





     





     





     


 


Morphine Sulfate  0.5 mg  08/24/19 11:28  08/26/19 04:30





  Morphine Sulfate  IVPB   0.5 mg





  Q8H PRN   Administration





  PAIN LEVEL 6-10   





     





     





     











Impression


1. hypernatremia


2. hyperkalemia


3. SBO


4. epilepsy


5. developemental delay





Plan


- potassium is normal


- discussed with medical team


- no objection to changing fluids over to clinimix


- surgery follow up


- monitor lytes closely


- pt with ng ti suction, monitor output

## 2019-08-27 LAB
ALBUMIN SERPL-MCNC: 2 G/DL (ref 3.4–5)
ALP SERPL-CCNC: 82 U/L (ref 45–117)
ALT SERPL-CCNC: 11 U/L (ref 13–61)
ANION GAP SERPL CALC-SCNC: 9 MMOL/L (ref 8–16)
AST SERPL-CCNC: 13 U/L (ref 15–37)
BASOPHILS # BLD: 0 % (ref 0–2)
BILIRUB SERPL-MCNC: 0.5 MG/DL (ref 0.2–1)
BUN SERPL-MCNC: 22.6 MG/DL (ref 7–18)
CALCIUM SERPL-MCNC: 8.6 MG/DL (ref 8.5–10.1)
CHLORIDE SERPL-SCNC: 101 MMOL/L (ref 98–107)
CO2 SERPL-SCNC: 25 MMOL/L (ref 21–32)
CREAT SERPL-MCNC: 0.4 MG/DL (ref 0.55–1.3)
DEPRECATED RDW RBC AUTO: 14.3 % (ref 11.6–15.6)
EOSINOPHIL # BLD: 0 % (ref 0–4.5)
GLUCOSE SERPL-MCNC: 135 MG/DL (ref 74–106)
HCT VFR BLD CALC: 37.7 % (ref 32.4–45.2)
HGB BLD-MCNC: 12.3 GM/DL (ref 10.7–15.3)
LYMPHOCYTES # BLD: 13 % (ref 8–40)
MAGNESIUM SERPL-MCNC: 2.1 MG/DL (ref 1.8–2.4)
MCH RBC QN AUTO: 28.1 PG (ref 25.7–33.7)
MCHC RBC AUTO-ENTMCNC: 32.5 G/DL (ref 32–36)
MCV RBC: 86.5 FL (ref 80–96)
MONOCYTES # BLD AUTO: 8.2 % (ref 3.8–10.2)
NEUTROPHILS # BLD: 78.8 % (ref 42.8–82.8)
PLATELET # BLD AUTO: 183 K/MM3 (ref 134–434)
PMV BLD: 9.5 FL (ref 7.5–11.1)
POTASSIUM SERPLBLD-SCNC: 3.1 MMOL/L (ref 3.5–5.1)
PROT SERPL-MCNC: 5.6 G/DL (ref 6.4–8.2)
RBC # BLD AUTO: 4.36 M/MM3 (ref 3.6–5.2)
SODIUM SERPL-SCNC: 135 MMOL/L (ref 136–145)
WBC # BLD AUTO: 9.2 K/MM3 (ref 4–10)

## 2019-08-27 RX ADMIN — LEUCINE, PHENYLALANINE, LYSINE, METHIONINE, ISOLEUCINE, VALINE, HISTIDINE, THREONINE, TRYPTOPHAN, ALANINE, GLYCINE, ARGININE, PROLINE, SERINE, TYROSINE, DEXTROSE SCH MLS/HR: 311; 238; 247; 170; 255; 247; 204; 179; 77; 880; 438; 489; 289; 213; 17; 5 INJECTION INTRAVENOUS at 22:06

## 2019-08-27 RX ADMIN — PIPERACILLIN SODIUM,TAZOBACTAM SODIUM SCH MLS/HR: 3; .375 INJECTION, POWDER, FOR SOLUTION INTRAVENOUS at 09:54

## 2019-08-27 RX ADMIN — MORPHINE SULFATE PRN MG: 2 INJECTION, SOLUTION INTRAMUSCULAR; INTRAVENOUS at 17:45

## 2019-08-27 RX ADMIN — POTASSIUM CHLORIDE SCH MLS/HR: 7.46 INJECTION, SOLUTION INTRAVENOUS at 14:01

## 2019-08-27 RX ADMIN — LEVETIRACETAM SCH MG: 100 INJECTION, SOLUTION, CONCENTRATE INTRAVENOUS at 22:39

## 2019-08-27 RX ADMIN — MORPHINE SULFATE PRN MG: 2 INJECTION, SOLUTION INTRAMUSCULAR; INTRAVENOUS at 13:15

## 2019-08-27 RX ADMIN — LEVETIRACETAM SCH MG: 100 INJECTION, SOLUTION, CONCENTRATE INTRAVENOUS at 09:54

## 2019-08-27 RX ADMIN — PIPERACILLIN SODIUM,TAZOBACTAM SODIUM SCH MLS/HR: 3; .375 INJECTION, POWDER, FOR SOLUTION INTRAVENOUS at 17:43

## 2019-08-27 RX ADMIN — LEUCINE, PHENYLALANINE, LYSINE, METHIONINE, ISOLEUCINE, VALINE, HISTIDINE, THREONINE, TRYPTOPHAN, ALANINE, GLYCINE, ARGININE, PROLINE, SERINE, TYROSINE, DEXTROSE SCH MLS/HR: 311; 238; 247; 170; 255; 247; 204; 179; 77; 880; 438; 489; 289; 213; 17; 5 INJECTION INTRAVENOUS at 17:47

## 2019-08-27 RX ADMIN — MORPHINE SULFATE PRN MG: 2 INJECTION, SOLUTION INTRAMUSCULAR; INTRAVENOUS at 22:01

## 2019-08-27 RX ADMIN — PIPERACILLIN SODIUM,TAZOBACTAM SODIUM SCH MLS/HR: 3; .375 INJECTION, POWDER, FOR SOLUTION INTRAVENOUS at 01:52

## 2019-08-27 RX ADMIN — LEUCINE, PHENYLALANINE, LYSINE, METHIONINE, ISOLEUCINE, VALINE, HISTIDINE, THREONINE, TRYPTOPHAN, ALANINE, GLYCINE, ARGININE, PROLINE, SERINE, TYROSINE, DEXTROSE SCH MLS/HR: 311; 238; 247; 170; 255; 247; 204; 179; 77; 880; 438; 489; 289; 213; 17; 5 INJECTION INTRAVENOUS at 04:30

## 2019-08-27 RX ADMIN — POTASSIUM CHLORIDE SCH MLS/HR: 7.46 INJECTION, SOLUTION INTRAVENOUS at 15:15

## 2019-08-27 RX ADMIN — LEUCINE, PHENYLALANINE, LYSINE, METHIONINE, ISOLEUCINE, VALINE, HISTIDINE, THREONINE, TRYPTOPHAN, ALANINE, GLYCINE, ARGININE, PROLINE, SERINE, TYROSINE, DEXTROSE SCH: 311; 238; 247; 170; 255; 247; 204; 179; 77; 880; 438; 489; 289; 213; 17; 5 INJECTION INTRAVENOUS at 20:37

## 2019-08-27 RX ADMIN — MORPHINE SULFATE PRN MG: 2 INJECTION, SOLUTION INTRAMUSCULAR; INTRAVENOUS at 07:03

## 2019-08-27 NOTE — PN
Progress Note (short form)





- Note


Progress Note: 


s: nonverbal


 Current Medications





Acetaminophen (Tylenol Suppository -)  650 mg [place] Q6H PRN


   PRN Reason: FEVER


Albuterol Sulfate (Ventolin 0.083% Nebulizer Soln -)  1 amp NEB Q4H PRN


   PRN Reason: SHORTNESS OF BREATH


Piperacillin Sod/Tazobactam (Sod 3.375 gm/ Dextrose)  50 mls @ 100 mls/hr IVPB 

Q8H-IV [facility]; Protocol


   Last Admin: 08/27/19 09:54 Dose:  100 mls/hr


Amino Acids (Clinimix -)  1,000 mls @ 84 mls/hr IV Q12H [facility]


   Last Admin: 08/27/19 04:30 Dose:  84 mls/hr


Levetiracetam (Keppra Injection -)  500 mg IVPB BID [facility]


   Last Admin: 08/27/19 09:54 Dose:  500 mg


Lorazepam (Ativan Injection -)  0.25 mg IVPB DAILY PRN


   PRN Reason: ANXIETY


   Last Admin: 08/27/19 01:51 Dose:  0.25 mg


Metoprolol Tartrate (Lopressor Injection -)  5 mg IVPUSH Q4H PRN


   PRN Reason: TACHYCARDIA


   Last Admin: 08/25/19 06:54 Dose:  5 mg


Morphine Sulfate (Morphine Sulfate)  0.5 mg IVPB Q4H PRN


   PRN Reason: PAIN LEVEL 6-10








 Vital Signs











 Period  Temp  Pulse  Resp  BP Sys/Carlson  Pulse Ox


 


 Last 24 Hr  97.4 F-98.6 F  110-125  22-24  105-125/67-89  97











Gen: NAD


Cardiovascular: Yes: Regular Rate and Rhythm


Respiratory: Yes: Rhonchi


Gastrointestinal: Yes: Soft


Edema: No


no jaundice diaphoresis


not agitated





- ....Imaging


EKG: Image Reviewed





Assessment/Plan





DATA:








tele: sinus, sinus tach





echo 8/2019  nl LV function, E/A reversal, RV not well visualized, mild MR





IMP:


Sinus tachycardia:


- sinus, monitoring on tele


- echo unremarkable


- likely in setting of underlying pain, anxiety, SBO - manage per primary, HR 

improving


- trop neg x 1, no ischemia on EKG - unlikely ACS





SBO:


- manage per surgery, NGT in place





Seizure disorder:


- manage per primary





GERD:


-holding meds, NPO

## 2019-08-27 NOTE — PN
Progress Note, Physician


History of Present Illness: 





Pt seen and examined at bedside. She appears comfortable. She is on clinimix. 





- Current Medication List


Current Medications: 


Active Medications





Acetaminophen (Tylenol Suppository -)  650 mg [place] Q6H PRN


   PRN Reason: FEVER


Albuterol Sulfate (Ventolin 0.083% Nebulizer Soln -)  1 amp NEB Q4H PRN


   PRN Reason: SHORTNESS OF BREATH


Piperacillin Sod/Tazobactam (Sod 3.375 gm/ Dextrose)  50 mls @ 100 mls/hr IVPB 

Q8H-IV [facility]; Protocol


   Last Admin: 08/27/19 09:54 Dose:  100 mls/hr


Amino Acids (Clinimix -)  1,000 mls @ 84 mls/hr IV Q12H [facility]


   Last Admin: 08/27/19 04:30 Dose:  84 mls/hr


Levetiracetam (Keppra Injection -)  500 mg IVPB BID [facility]


   Last Admin: 08/27/19 09:54 Dose:  500 mg


Lorazepam (Ativan Injection -)  0.25 mg IVPB DAILY PRN


   PRN Reason: ANXIETY


   Last Admin: 08/27/19 01:51 Dose:  0.25 mg


Metoprolol Tartrate (Lopressor Injection -)  5 mg IVPUSH Q4H PRN


   PRN Reason: TACHYCARDIA


   Last Admin: 08/25/19 06:54 Dose:  5 mg


Morphine Sulfate (Morphine Sulfate)  0.5 mg IVPB Q4H PRN


   PRN Reason: PAIN LEVEL 6-10


   Last Admin: 08/27/19 13:15 Dose:  0.5 mg











- Objective


Vital Signs: 


 Vital Signs











Temperature  99 F   08/27/19 14:00


 


Pulse Rate  113 H  08/27/19 14:00


 


Respiratory Rate  20   08/27/19 14:00


 


Blood Pressure  129/86   08/27/19 14:00


 


O2 Sat by Pulse Oximetry (%)  95   08/27/19 15:42











Constitutional: Yes: Calm


Eyes: Yes: Conjunctiva Clear


HENT: Yes: Atraumatic


Cardiovascular: Yes: S1, S2


Respiratory: Yes: CTA Bilaterally


Gastrointestinal: Yes: Soft, Other (ng tube)


Genitourinary: Yes: Incontinence


Musculoskeletal: Yes: Other (contracted)


Edema: No


Neurological: Yes: Pre-Existing Deficit


Labs: 


 CBC, BMP





 08/27/19 07:18 





 08/27/19 07:18 





 INR, PTT











INR  1.00  (0.82-1.09)   08/22/19  17:22    














Problem List





- Problems


(1) Hyperkalemia


Code(s): E87.5 - HYPERKALEMIA   





(2) Hypernatremia


Code(s): E87.0 - HYPEROSMOLALITY AND HYPERNATREMIA   





(3) SBO (small bowel obstruction)


Code(s): K56.609 - UNSP INTESTNL OBST, UNSP AS TO PARTIAL VERSUS COMPLETE OBST 

  





(4) Seizure


Code(s): R56.9 - UNSPECIFIED CONVULSIONS   





Assessment/Plan


 Current Medications











Generic Name Dose Route Start Last Admin





  Trade Name Freq  PRN Reason Stop Dose Admin


 


Acetaminophen  650 mg  08/24/19 14:13  





  Tylenol Suppository -  FL   





  Q6H PRN   





  FEVER   





     





     





     


 


Albuterol Sulfate  1 amp  08/24/19 14:13  





  Ventolin 0.083% Nebulizer Soln -  NEB   





  Q4H PRN   





  SHORTNESS OF BREATH   





     





     





     


 


Piperacillin Sod/Tazobactam  50 mls @ 100 mls/hr  08/24/19 18:00  08/27/19 09:54





  Sod 3.375 gm/ Dextrose  IVPB   100 mls/hr





  Q8H-IV [facility]   Administration





     





     





  Protocol   





     


 


Amino Acids  1,000 mls @ 84 mls/hr  08/26/19 12:30  08/27/19 04:30





  Clinimix -  IV   84 mls/hr





  Q12H RO[facility]   Administration





     





     





     





     


 


Levetiracetam  500 mg  08/24/19 22:00  08/27/19 09:54





  Keppra Injection -  IVPB   500 mg





  BID RO[facility]   Administration





     





     





     





     


 


Lorazepam  0.25 mg  08/25/19 06:00  08/27/19 01:51





  Ativan Injection -  IVPB   0.25 mg





  DAILY PRN   Administration





  ANXIETY   





     





     





     


 


Metoprolol Tartrate  5 mg  08/24/19 15:49  08/25/19 06:54





  Lopressor Injection -  IVPUSH   5 mg





  Q4H PRN   Administration





  TACHYCARDIA   





     





     





     


 


Morphine Sulfate  0.5 mg  08/27/19 11:28  08/27/19 13:15





  Morphine Sulfate  IVPB   0.5 mg





  Q4H PRN   Administration





  PAIN LEVEL 6-10   





     





     





     











Impression


1. hypernatremia


2. hyperkalemia


3. SBO


4. epilepsy


5. developemental delay





Plan


- decrease rate of clinimix


- replace potassium


- monitor labs closely while on clinimix


- surgery follow up





- potassium is normal


- discussed with medical team


- no objection to changing fluids over to clinimix


- surgery follow up


- monitor lytes closely


- pt with ng ti suction, monitor output

## 2019-08-27 NOTE — PN
Progress Note, Physician





- Current Medication List


Current Medications: 


Active Medications





Acetaminophen (Tylenol Suppository -)  650 mg [place] Q6H PRN


   PRN Reason: FEVER


Albuterol Sulfate (Ventolin 0.083% Nebulizer Soln -)  1 amp NEB Q4H PRN


   PRN Reason: SHORTNESS OF BREATH


Piperacillin Sod/Tazobactam (Sod 3.375 gm/ Dextrose)  50 mls @ 100 mls/hr IVPB 

Q8H-IV [facility]; Protocol


   Last Admin: 08/27/19 09:54 Dose:  100 mls/hr


Amino Acids (Clinimix -)  1,000 mls @ 84 mls/hr IV Q12H [facility]


   Last Admin: 08/27/19 04:30 Dose:  84 mls/hr


Levetiracetam (Keppra Injection -)  500 mg IVPB BID [facility]


   Last Admin: 08/27/19 09:54 Dose:  500 mg


Lorazepam (Ativan Injection -)  0.25 mg IVPB DAILY PRN


   PRN Reason: ANXIETY


   Last Admin: 08/27/19 01:51 Dose:  0.25 mg


Metoprolol Tartrate (Lopressor Injection -)  5 mg IVPUSH Q4H PRN


   PRN Reason: TACHYCARDIA


   Last Admin: 08/25/19 06:54 Dose:  5 mg


Morphine Sulfate (Morphine Sulfate)  0.5 mg IVPB Q4H PRN


   PRN Reason: PAIN LEVEL 6-10


   Last Admin: 08/27/19 13:15 Dose:  0.5 mg











- Objective


Vital Signs: 


 Vital Signs











Temperature  98.6 F   08/27/19 06:00


 


Pulse Rate  125 H  08/27/19 06:00


 


Respiratory Rate  22 H  08/27/19 06:00


 


Blood Pressure  109/76   08/27/19 06:00


 


O2 Sat by Pulse Oximetry (%)  97   08/26/19 21:00











Labs: 


 CBC, BMP





 08/27/19 07:18 





 08/27/19 07:18 





 INR, PTT











INR  1.00  (0.82-1.09)   08/22/19  17:22

## 2019-08-27 NOTE — PN
Progress Note (short form)





- Note


Progress Note: 





Surgery





Patient being followed for SBO seen and examined at bedside with family and her 

Aide from the facility at bedside. Patient's sister states she exhibited pain 

throughout the evening and overnight crying at times and not sleeping.  Nursing 

reports no new symptoms and the NGT has been maintained on low, continuous 


wall suction.





  


 Vital Signs








Temp  97.8 F   08/26/19 23:00


 


Pulse  121 H  08/26/19 23:00


 


Resp  22 H  08/26/19 23:00


 


BP  107/67   08/26/19 23:00


 


Pulse Ox  97   08/26/19 21:00








 Intake & Output











 08/26/19 08/26/19 08/27/19





 11:59 23:59 11:59


 


Intake Total 900 436 


 


Balance 900 436 


 


Intake:   


 


   336 


 


    Clinimix  336 


 


    D5w - 1,000 ml @ 75 mls/ 900  





    hr IV ASDIR ROSE MARIE Rx#:   





    [id number]   


 


  IVPB  100 


 


  Oral 0 0 


 


Other:   


 


  Voiding Method Incontinent Incontinent 


 


  # Unmeasured Voids   


 


    Void 1 3 


 


  Bowel Movement  Yes 


 


  # Bowel Movements  2 








 CBC, BMP





 08/26/19 06:15 





 08/26/19 06:15 











PE:


Patient resting comfortably but appears restless this morning. 


Unlabored resp on RA


Extremities contracted x4


Abd: Soft, nontender, mild distention,  no guarding, no rebound.  Healed 

midline surgical scar and lateral scar. 





ABD on 8/26, no significant change from prior study





ABD x-ray this morning pending

















Problem List





- Problems


(1) SBO (small bowel obstruction)


Assessment/Plan: 


-reassess pain management given her weight- will discuss with team. 


-NPO/NGT- maintained at low continuous suction


-IVF


- serial abd exams 


- f/u serial x rays


-Surgery to follow





Evaluation and plan discussed with Dr Alfaro











Code(s): K56.609 - UNSP INTESTNL OBST, UNSP AS TO PARTIAL VERSUS COMPLETE OBST

## 2019-08-27 NOTE — PN
Progress Note, Physician


Chief Complaint: 








patient non-verbal. Sister at bedside and fells she is having more pain.





History of Present Illness: 











History of Present Illness: 





Patient is a 59 year old female (resident of [facility]) with a PMHx 

significant for profound mental retardation, cerebral palsy, congenital 

quadriplegia, severe scoliosis, seizure disorder, and GERD.  Transferred from 

[place] to [facility] for further management of SBO, sepsis and tachycardia.  





- Current Medication List


Current Medications: 


Active Medications





Acetaminophen (Tylenol Suppository -)  650 mg CA Q6H PRN


   PRN Reason: FEVER


Albuterol Sulfate (Ventolin 0.083% Nebulizer Soln -)  1 amp NEB Q4H PRN


   PRN Reason: SHORTNESS OF BREATH


Piperacillin Sod/Tazobactam (Sod 3.375 gm/ Dextrose)  50 mls @ 100 mls/hr IVPB 

Q8H-IV ROSE MARIE; Protocol


   Last Admin: 08/27/19 01:52 Dose:  100 mls/hr


Amino Acids (Clinimix -)  1,000 mls @ 84 mls/hr IV Q12H RO[facility]


   Last Admin: 08/27/19 04:30 Dose:  84 mls/hr


Levetiracetam (Keppra Injection -)  500 mg IVPB BID ROSE MARIE


   Last Admin: 08/26/19 21:23 Dose:  500 mg


Lorazepam (Ativan Injection -)  0.25 mg IVPB DAILY PRN


   PRN Reason: ANXIETY


   Last Admin: 08/27/19 01:51 Dose:  0.25 mg


Metoprolol Tartrate (Lopressor Injection -)  5 mg IVPUSH Q4H PRN


   PRN Reason: TACHYCARDIA


   Last Admin: 08/25/19 06:54 Dose:  5 mg


Morphine Sulfate (Morphine Sulfate)  0.5 mg IVPB Q8H PRN


   PRN Reason: PAIN LEVEL 6-10


   Last Admin: 08/27/19 07:03 Dose:  0.5 mg











- Objective


Vital Signs: 


 Vital Signs











Temperature  98.6 F   08/27/19 06:00


 


Pulse Rate  125 H  08/27/19 06:00


 


Respiratory Rate  22 H  08/27/19 06:00


 


Blood Pressure  109/76   08/27/19 06:00


 


O2 Sat by Pulse Oximetry (%)  97   08/26/19 21:00











Constitutional: Yes: Calm, Thin, Other


Eyes: Yes: WNL, Conjunctiva Clear, EOM Intact


HENT: Yes: WNL, Atraumatic, Normocephalic


Neck: Yes: WNL, Supple, Trachea Midline


Cardiovascular: Yes: WNL, Regular Rate and Rhythm, Tachycardia


Respiratory: Yes: WNL, Regular, Diminished (at bases, pt very contracted), 

Rhonchi (scattered)


Gastrointestinal: Yes: Soft, Hypoactive Bowel Sounds, Other (NGT noted to LIWS 

with greenish drainage. Abd firm, BS hypoactive. NPO)


...Rectal Exam: Yes: Deferred


Genitourinary: Yes: WNL (daiper in place)


Breast(s): Yes: WNL


Musculoskeletal: Yes: Other (UE/LE severly contracted)


Extremities: Yes: Deformity


Edema: Yes


Edema: LLE: Trace, RLE: Trace


Peripheral Pulses WNL: Yes


Integumentary: Yes: WNL


Neurological: Yes: Other (developmentaly delayed, non verbal. Able to trak with 

eyes)


...Motor Strength: LUE, LLE, RUE, RLE (generalized weakness, contracted)


Psychiatric: Yes: Alert


Labs: 


 CBC, BMP





 08/26/19 06:15 





 08/26/19 06:15 





 INR, PTT











INR  1.00  (0.82-1.09)   08/22/19  17:22    














Problem List





- Problems


(1) SBO (small bowel obstruction)


Assessment/Plan: 


-reassess pain management given her weight- will discuss with team. 


-NPO/NGT- maintained at low continuous suction


-IVF


- serial abd exams 


- f/u serial x rays


-Surgery Dr Alfaro to follow


Code(s): K56.609 - UNSP INTESTNL OBST, UNSP AS TO PARTIAL VERSUS COMPLETE OBST 

  





(2) Hypokalemia


Assessment/Plan: 


K 3.1 after repletion yesterday. KCL IV given


continue to monitor K, maintain >4.0


Code(s): E87.6 - HYPOKALEMIA   





(3) Prophylactic measure


Code(s): Z29.9 - ENCOUNTER FOR PROPHYLACTIC MEASURES, UNSPECIFIED   





(4) Seizure


Assessment/Plan: 


on keppra.  


on tegratol in NH, continue keppra iv bid.  once no longer npo, can restart 

tegretol. 





Code(s): R56.9 - UNSPECIFIED CONVULSIONS   





(5) Sepsis


Assessment/Plan: 


patient with elevated wbc 9.2, tachycardia, respiratory rate > 24, chest xray 

shows possible early left infiltrate


c/w Zosyn.  


monitor respiratory status.  


c/w 2 L NC


blood cultures ordered and pending.  ID following


Rhonchi of upper lobes improving.  Given lasix 20 ivpb yesterday with good 

response.


monitor output


Code(s): A41.9 - SEPSIS, UNSPECIFIED ORGANISM   





(6) Aspiration into airway


Code(s): T17.908A - UNSP FB IN RESP TRACT, PART UNSP CAUSING OTH INJURY, INIT   





(7) Cerebral palsy


Assessment/Plan: 


supportive care.


Code(s): G80.9 - CEREBRAL PALSY, UNSPECIFIED   





(8) Left pulmonary infiltrate on CXR


Assessment/Plan: 


CXR shows left lower lobe infiltrate.  


c/w zosyn.


support with 2 liters of nasal cannula


monitor oxygen levels and titrate off oxygen as tolerated.  not home oxygen 

dependent at home. 


Code(s): R91.8 - OTHER NONSPECIFIC ABNORMAL FINDING OF LUNG FIELD   





(9) Tachycardia


Assessment/Plan: 


 with liekly pain component


MSO4 .5mg IV q8H


will increase the frequency to q4 H and monitor HR


 


Code(s): R00.0 - TACHYCARDIA, UNSPECIFIED   





(10) Decreased oral intake


Assessment/Plan: 


NPO.  day #5 of NPO, will start Clinimax for nutrition.  


speech and swallow consulted and will assess once taking PO


Code(s): R63.8 - OTHER SYMPTOMS AND SIGNS CONCERNING FOOD AND FLUID INTAKE   





(11) Hypernatremia


Assessment/Plan: 


Na now 135


appreciate renal consultation


continue to monitor electrolytes


Code(s): E87.0 - HYPEROSMOLALITY AND HYPERNATREMIA   





Visit type





- Emergency Visit


Emergency Visit: Yes


ED Registration Date: 08/22/19


Care time: The patient presented to the Emergency Department on the above date 

and was hospitalized for further evaluation of their emergent condition.





- New Patient


This patient is new to me today: Yes


Date on this admission: 08/27/19





- Critical Care


Critical Care patient: No





- Discharge Referral


Referred to [facility] No

## 2019-08-28 LAB
ALBUMIN SERPL-MCNC: 2 G/DL (ref 3.4–5)
ALP SERPL-CCNC: 75 U/L (ref 45–117)
ALT SERPL-CCNC: 8 U/L (ref 13–61)
ANION GAP SERPL CALC-SCNC: 8 MMOL/L (ref 8–16)
AST SERPL-CCNC: 14 U/L (ref 15–37)
BASOPHILS # BLD: 0.3 % (ref 0–2)
BILIRUB SERPL-MCNC: 0.7 MG/DL (ref 0.2–1)
BUN SERPL-MCNC: 21.9 MG/DL (ref 7–18)
CALCIUM SERPL-MCNC: 8.8 MG/DL (ref 8.5–10.1)
CHLORIDE SERPL-SCNC: 101 MMOL/L (ref 98–107)
CO2 SERPL-SCNC: 27 MMOL/L (ref 21–32)
CREAT SERPL-MCNC: 0.3 MG/DL (ref 0.55–1.3)
DEPRECATED RDW RBC AUTO: 14 % (ref 11.6–15.6)
EOSINOPHIL # BLD: 0 % (ref 0–4.5)
GLUCOSE SERPL-MCNC: 131 MG/DL (ref 74–106)
HCT VFR BLD CALC: 32.2 % (ref 32.4–45.2)
HGB BLD-MCNC: 10.8 GM/DL (ref 10.7–15.3)
LYMPHOCYTES # BLD: 12.3 % (ref 8–40)
MAGNESIUM SERPL-MCNC: 2 MG/DL (ref 1.8–2.4)
MCH RBC QN AUTO: 28.3 PG (ref 25.7–33.7)
MCHC RBC AUTO-ENTMCNC: 33.5 G/DL (ref 32–36)
MCV RBC: 84.4 FL (ref 80–96)
MONOCYTES # BLD AUTO: 10.1 % (ref 3.8–10.2)
NEUTROPHILS # BLD: 77.3 % (ref 42.8–82.8)
PLATELET # BLD AUTO: 193 K/MM3 (ref 134–434)
PMV BLD: 9.9 FL (ref 7.5–11.1)
POTASSIUM SERPLBLD-SCNC: 3 MMOL/L (ref 3.5–5.1)
PROT SERPL-MCNC: 5.4 G/DL (ref 6.4–8.2)
RBC # BLD AUTO: 3.82 M/MM3 (ref 3.6–5.2)
SODIUM SERPL-SCNC: 136 MMOL/L (ref 136–145)
WBC # BLD AUTO: 9 K/MM3 (ref 4–10)

## 2019-08-28 RX ADMIN — POTASSIUM CHLORIDE SCH MLS/HR: 7.46 INJECTION, SOLUTION INTRAVENOUS at 13:08

## 2019-08-28 RX ADMIN — LEUCINE, PHENYLALANINE, LYSINE, METHIONINE, ISOLEUCINE, VALINE, HISTIDINE, THREONINE, TRYPTOPHAN, ALANINE, GLYCINE, ARGININE, PROLINE, SERINE, TYROSINE, DEXTROSE SCH: 311; 238; 247; 170; 255; 247; 204; 179; 77; 880; 438; 489; 289; 213; 17; 5 INJECTION INTRAVENOUS at 04:29

## 2019-08-28 RX ADMIN — MORPHINE SULFATE PRN MG: 2 INJECTION, SOLUTION INTRAMUSCULAR; INTRAVENOUS at 05:30

## 2019-08-28 RX ADMIN — POTASSIUM CHLORIDE SCH MLS/HR: 7.46 INJECTION, SOLUTION INTRAVENOUS at 14:27

## 2019-08-28 RX ADMIN — POTASSIUM CHLORIDE SCH MLS/HR: 7.46 INJECTION, SOLUTION INTRAVENOUS at 15:30

## 2019-08-28 RX ADMIN — PIPERACILLIN SODIUM,TAZOBACTAM SODIUM SCH MLS/HR: 3; .375 INJECTION, POWDER, FOR SOLUTION INTRAVENOUS at 17:53

## 2019-08-28 RX ADMIN — POTASSIUM CHLORIDE SCH MLS/HR: 7.46 INJECTION, SOLUTION INTRAVENOUS at 16:45

## 2019-08-28 RX ADMIN — POTASSIUM CHLORIDE SCH MLS/HR: 149 INJECTION, SOLUTION, CONCENTRATE INTRAVENOUS at 14:56

## 2019-08-28 RX ADMIN — LEVETIRACETAM SCH MG: 100 INJECTION, SOLUTION, CONCENTRATE INTRAVENOUS at 11:30

## 2019-08-28 RX ADMIN — PIPERACILLIN SODIUM,TAZOBACTAM SODIUM SCH MLS/HR: 3; .375 INJECTION, POWDER, FOR SOLUTION INTRAVENOUS at 01:06

## 2019-08-28 RX ADMIN — LEVETIRACETAM SCH MG: 100 INJECTION, SOLUTION, CONCENTRATE INTRAVENOUS at 21:49

## 2019-08-28 RX ADMIN — PIPERACILLIN SODIUM,TAZOBACTAM SODIUM SCH MLS/HR: 3; .375 INJECTION, POWDER, FOR SOLUTION INTRAVENOUS at 11:29

## 2019-08-28 RX ADMIN — MORPHINE SULFATE PRN MG: 2 INJECTION, SOLUTION INTRAMUSCULAR; INTRAVENOUS at 23:47

## 2019-08-28 NOTE — PN
Progress Note (short form)





- Note


Progress Note: 


s: nonverbal


  Current Medications





Acetaminophen (Tylenol Suppository -)  650 mg [place] Q6H PRN


   PRN Reason: FEVER


   Last Admin: 08/28/19 05:08 Dose:  650 mg


Albuterol Sulfate (Ventolin 0.083% Nebulizer Soln -)  1 amp NEB Q4H PRN


   PRN Reason: SHORTNESS OF BREATH


Piperacillin Sod/Tazobactam (Sod 3.375 gm/ Dextrose)  50 mls @ 100 mls/hr IVPB 

Q8H-IV [facility]; Protocol


   Last Admin: 08/28/19 11:29 Dose:  100 mls/hr


Amino Acids (Clinimix -)  1,000 mls @ 60 mls/hr IV Q12H RO[facility]


   Last Admin: 08/28/19 04:29 Dose:  Not Given


Potassium Chloride (Potassium Chloride 10 Meq Premix Ivpb -)  10 meq in 100 mls 

@ 100 mls/hr IVPB Q60M ROSE MARIE


   Stop: 08/28/19 12:29


Levetiracetam (Keppra Injection -)  500 mg IVPB BID ROSE MARIE


   Last Admin: 08/28/19 11:30 Dose:  500 mg


Lorazepam (Ativan Injection -)  0.25 mg IVPB DAILY PRN


   PRN Reason: ANXIETY


   Last Admin: 08/27/19 01:51 Dose:  0.25 mg


Metoprolol Tartrate (Lopressor Injection -)  5 mg IVPUSH Q4H PRN


   PRN Reason: TACHYCARDIA


   Last Admin: 08/25/19 06:54 Dose:  5 mg


Morphine Sulfate (Morphine Sulfate)  0.5 mg IVPB Q4H PRN


   PRN Reason: PAIN LEVEL 6-10


   Last Admin: 08/28/19 05:30 Dose:  0.5 mg











  Vital Signs











 Period  Temp  Pulse  Resp  BP Sys/Carlson  Pulse Ox


 


 Last 24 Hr  97.6 F-100.3 F  106-133  18-20  111-129/71-86  











Gen: NAD


Cardiovascular: Yes: Regular Rate and Rhythm


Respiratory: Yes: Rhonchi


Gastrointestinal: Yes: Soft


Edema: No


no jaundice diaphoresis


not agitated





- ....Imaging


EKG: Image Reviewed





Assessment/Plan





DATA:








tele: sinus tachycardia





echo 8/2019  nl LV function, E/A reversal, RV not well visualized, mild MR





IMP:


Sinus tachycardia:


- sinus, monitoring on tele


- echo unremarkable


- likely in setting of underlying pain, anxiety, SBO - manage per primary, HR 

improving


- trop neg x 1, no ischemia on EKG - unlikely ACS





SBO:


- manage per surgery, NGT in place





Seizure disorder:


- manage per primary





GERD:


-holding meds, NPO

## 2019-08-28 NOTE — PN
Progress Note, Physician


History of Present Illness: 





stable


no new issues





- Current Medication List


Current Medications: 


Active Medications





Acetaminophen (Tylenol Suppository -)  650 mg [place] Q6H PRN


   PRN Reason: FEVER


   Last Admin: 08/28/19 05:08 Dose:  650 mg


Albuterol Sulfate (Ventolin 0.083% Nebulizer Soln -)  1 amp NEB Q4H PRN


   PRN Reason: SHORTNESS OF BREATH


Piperacillin Sod/Tazobactam (Sod 3.375 gm/ Dextrose)  50 mls @ 100 mls/hr IVPB 

Q8H-IV RO[facility]IE; Protocol


   Last Admin: 08/28/19 17:53 Dose:  100 mls/hr


Potassium Chloride 20 meq/ (Amino Acids)  1,010 mls @ 60 mls/hr IVPB Q12H ROSE [facility]IE


   Last Admin: 08/28/19 14:56 Dose:  60 mls/hr


Levetiracetam (Keppra Injection -)  500 mg IVPB BID ROSE MARIE


   Last Admin: 08/28/19 11:30 Dose:  500 mg


Lorazepam (Ativan Injection -)  0.25 mg IVPB DAILY PRN


   PRN Reason: ANXIETY


   Last Admin: 08/27/19 01:51 Dose:  0.25 mg


Metoprolol Tartrate (Lopressor Injection -)  5 mg IVPUSH Q4H PRN


   PRN Reason: TACHYCARDIA


   Last Admin: 08/25/19 06:54 Dose:  5 mg


Morphine Sulfate (Morphine Sulfate)  0.5 mg IVPB Q4H PRN


   PRN Reason: PAIN LEVEL 6-10


   Last Admin: 08/28/19 05:30 Dose:  0.5 mg











- Objective


Vital Signs: 


 Vital Signs











Temperature  98.5 F   08/28/19 14:19


 


Pulse Rate  125 H  08/28/19 14:19


 


Respiratory Rate  20   08/28/19 14:19


 


Blood Pressure  109/74   08/28/19 14:19


 


O2 Sat by Pulse Oximetry (%)  100   08/28/19 09:00











Constitutional: Yes: No Distress, Calm


Cardiovascular: Yes: S1, S2


Respiratory: Yes: Regular, Poor Air Entry (bases)


Gastrointestinal: Yes: Soft, Hypoactive Bowel Sounds


Extremities: Yes: Other (contracted)


Neurological: Yes: Alert, Other


Labs: 


 CBC, BMP





 08/28/19 05:45 





 08/28/19 05:45 





 INR, PTT











INR  1.00  (0.82-1.09)   08/22/19  17:22    














Assessment/Plan





59 year-old female resident of [facility], with a PMH significant for 

profound mental retardation, cerebral palsy, congenital quadriplegia, severe 

scoliosis, seizure disorder, and GERD. Admitted for SBO.





SBO


Profound mental retardation


Cerebral palsy


Congenital quadriplegia


Severe scoliosis


Seizure disorder


GERD





plan


npo


hydration


ng suctioning


will see tomorrow


will d/w the surgical team

## 2019-08-28 NOTE — PN
Progress Note, Physician


Chief Complaint: 








patient non-verbal. Sister at bedside and fells she is having more pain.





History of Present Illness: 











History of Present Illness: 





Patient is a 59 year old female (resident of [facility]) with a PMHx 

significant for profound mental retardation, cerebral palsy, congenital 

quadriplegia, severe scoliosis, seizure disorder, and GERD.  Transferred from 

[place] to [facility] for further management of SBO, sepsis and tachycardia.  





- Current Medication List


Current Medications: 


Active Medications





Acetaminophen (Tylenol Suppository -)  650 mg [place] Q6H PRN


   PRN Reason: FEVER


   Last Admin: 08/28/19 05:08 Dose:  650 mg


Albuterol Sulfate (Ventolin 0.083% Nebulizer Soln -)  1 amp NEB Q4H PRN


   PRN Reason: SHORTNESS OF BREATH


Piperacillin Sod/Tazobactam (Sod 3.375 gm/ Dextrose)  50 mls @ 100 mls/hr IVPB 

Q8H-IV RO[facility]IE; Protocol


   Last Admin: 08/28/19 01:06 Dose:  100 mls/hr


Amino Acids (Clinimix -)  1,000 mls @ 60 mls/hr IV Q12H RO[facility]IE


   Last Admin: 08/28/19 04:29 Dose:  Not Given


Potassium Chloride (Potassium Chloride 10 Meq Premix Ivpb -)  10 meq in 100 mls 

@ 100 mls/hr IVPB Q60M ROSE [facility]IE


   Stop: 08/28/19 11:44


Levetiracetam (Keppra Injection -)  500 mg IVPB BID ROSE MARIE


   Last Admin: 08/27/19 22:39 Dose:  500 mg


Lorazepam (Ativan Injection -)  0.25 mg IVPB DAILY PRN


   PRN Reason: ANXIETY


   Last Admin: 08/27/19 01:51 Dose:  0.25 mg


Metoprolol Tartrate (Lopressor Injection -)  5 mg IVPUSH Q4H PRN


   PRN Reason: TACHYCARDIA


   Last Admin: 08/25/19 06:54 Dose:  5 mg


Morphine Sulfate (Morphine Sulfate)  0.5 mg IVPB Q4H PRN


   PRN Reason: PAIN LEVEL 6-10


   Last Admin: 08/28/19 05:30 Dose:  0.5 mg











- Objective


Vital Signs: 


 Vital Signs











Temperature  97.9 F   08/28/19 06:00


 


Pulse Rate  133 H  08/28/19 05:22


 


Respiratory Rate  20   08/28/19 05:22


 


Blood Pressure  111/71   08/28/19 05:22


 


O2 Sat by Pulse Oximetry (%)  100   08/27/19 21:00











Constitutional: Yes: No Distress, Thin


Eyes: Yes: WNL, Conjunctiva Clear, EOM Intact


HENT: Yes: WNL, Atraumatic, Normocephalic


Neck: Yes: WNL, Supple, Trachea Midline


Cardiovascular: Yes: Regular Rate and Rhythm, Tachycardia


Respiratory: Yes: Regular, Diminished (secodary to severe kyphosis)


Gastrointestinal: Yes: Soft (NGT to LIWS with greenish output)


...Rectal Exam: Yes: Deferred


Genitourinary: Yes: Incontinence


Breast(s): Yes: WNL


Musculoskeletal: Yes: Other (severe contratures)


Edema: Yes


Edema: LLE: 1+, RLE: 1+


Peripheral Pulses WNL: Yes


Integumentary: Yes: WNL


Neurological: Yes: Alert (non verbal)


Labs: 


 CBC, BMP





 08/28/19 05:45 





 INR, PTT











INR  1.00  (0.82-1.09)   08/22/19  17:22    














Problem List





- Problems


(1) SBO (small bowel obstruction)


Assessment/Plan: 


NPO/NGT- maintained at low continuous suction


-IVF


- serial abd exams 


- GI series done today- will f/u


-Pt had small moderate BM


-Surgery Dr Alfaro to follow


Code(s): K56.609 - UNSP INTESTNL OBST, UNSP AS TO PARTIAL VERSUS COMPLETE OBST 

  





(2) Hypokalemia


Assessment/Plan: 


K 3.0 after repletion yesterday. 40meQ  KCL IV given


continue to monitor K, maintain >4.0


clinimix changed by Dr Huizar with increased potassium


Code(s): E87.6 - HYPOKALEMIA   





(3) Prophylactic measure


Assessment/Plan: 


FEN


NPO 


c/w Climinix 


monitor electrolytes





Dispo 


maintain on tele


full code


discharge planning





DVT


scds








Code(s): Z29.9 - ENCOUNTER FOR PROPHYLACTIC MEASURES, UNSPECIFIED   





(4) Seizure


Assessment/Plan: 


on keppra.  


on tegratol in NH, continue keppra iv bid.  once no longer npo, can restart 

tegretol. 





Code(s): R56.9 - UNSPECIFIED CONVULSIONS   





(5) Sepsis


Assessment/Plan: 


patient with elevated wbc 9.0, tachycardia better with increased frequncy of 

MSO4


monitor respiratory status.  


c/w 2 L NC


blood cultures NGTD


ID following


Rhonchi of upper lobes improving.  


monitor output


Code(s): A41.9 - SEPSIS, UNSPECIFIED ORGANISM   





(6) Aspiration into airway


Assessment/Plan: 


chest xray shows left lower lobe infiltrate.  on zosyn.


support with 2 liters of nasal cannula


monitor oxygen levels and titrate off oxygen as tolerated.  not home oxygen 

dependent at home. 


Code(s): T17.908A - UNSP FB IN RESP TRACT, PART UNSP CAUSING OTH INJURY, INIT   





(7) Cerebral palsy


Assessment/Plan: 


supportive care.


Code(s): G80.9 - CEREBRAL PALSY, UNSPECIFIED   





(8) Left pulmonary infiltrate on CXR


Assessment/Plan: 


CXR shows left lower lobe infiltrate.  


c/w zosyn.


support with 2 liters of nasal cannula


monitor oxygen levels and titrate off oxygen as tolerated.  not home oxygen 

dependent at home. 


Code(s): R91.8 - OTHER NONSPECIFIC ABNORMAL FINDING OF LUNG FIELD   





(9) Tachycardia


Assessment/Plan: 


HR better with increased frequency of MSO4


c/w MSO4 .5mg IV q8H





 


Code(s): R00.0 - TACHYCARDIA, UNSPECIFIED   





(10) Decreased oral intake


Assessment/Plan: 


NPO.  day #6 of NPO, c/w  Clinimax for nutrition.  


speech and swallow consulted and will assess once taking PO


Code(s): R63.8 - OTHER SYMPTOMS AND SIGNS CONCERNING FOOD AND FLUID INTAKE   





(11) Hypernatremia


Assessment/Plan: 


Na now 136


appreciate renal consultation


continue to monitor electrolytes


Code(s): E87.0 - HYPEROSMOLALITY AND HYPERNATREMIA   





Visit type





- Emergency Visit


Emergency Visit: Yes


ED Registration Date: 08/22/19


Care time: The patient presented to the Emergency Department on the above date 

and was hospitalized for further evaluation of their emergent condition.





- New Patient


This patient is new to me today: No





- Critical Care


Critical Care patient: No





- Discharge Referral


Referred to [facility] No

## 2019-08-28 NOTE — PN
Progress Note, Physician


History of Present Illness: 





Pt seen and examined at bedside. She appears comfortable. 





- Current Medication List


Current Medications: 


Active Medications





Acetaminophen (Tylenol Suppository -)  650 mg NE Q6H PRN


   PRN Reason: FEVER


   Last Admin: 08/28/19 05:08 Dose:  650 mg


Albuterol Sulfate (Ventolin 0.083% Nebulizer Soln -)  1 amp NEB Q4H PRN


   PRN Reason: SHORTNESS OF BREATH


Piperacillin Sod/Tazobactam (Sod 3.375 gm/ Dextrose)  50 mls @ 100 mls/hr IVPB 

Q8H-IV RO[facility]IE; Protocol


   Last Admin: 08/28/19 11:29 Dose:  100 mls/hr


Amino Acids (Clinimix -)  1,000 mls @ 60 mls/hr IV Q12H ROSE MARIE


   Last Admin: 08/28/19 04:29 Dose:  Not Given


Levetiracetam (Keppra Injection -)  500 mg IVPB BID [facility]IRE


   Last Admin: 08/28/19 11:30 Dose:  500 mg


Lorazepam (Ativan Injection -)  0.25 mg IVPB DAILY PRN


   PRN Reason: ANXIETY


   Last Admin: 08/27/19 01:51 Dose:  0.25 mg


Metoprolol Tartrate (Lopressor Injection -)  5 mg IVPUSH Q4H PRN


   PRN Reason: TACHYCARDIA


   Last Admin: 08/25/19 06:54 Dose:  5 mg


Morphine Sulfate (Morphine Sulfate)  0.5 mg IVPB Q4H PRN


   PRN Reason: PAIN LEVEL 6-10


   Last Admin: 08/28/19 05:30 Dose:  0.5 mg











- Objective


Vital Signs: 


 Vital Signs











Temperature  97.5 F L  08/28/19 11:10


 


Pulse Rate  123 H  08/28/19 11:10


 


Respiratory Rate  18   08/28/19 11:10


 


Blood Pressure  112/88   08/28/19 11:10


 


O2 Sat by Pulse Oximetry (%)  100   08/28/19 09:00











Constitutional: Yes: Calm


Eyes: Yes: Conjunctiva Clear


Neck: Yes: Supple


Cardiovascular: Yes: S1, S2


Respiratory: Yes: CTA Bilaterally


Gastrointestinal: Yes: Soft


Genitourinary: Yes: Incontinence


Musculoskeletal: Yes: Other (contracted)


Edema: No


Neurological: Yes: Pre-Existing Deficit


Labs: 


 CBC, BMP





 08/28/19 05:45 





 08/28/19 05:45 





 INR, PTT











INR  1.00  (0.82-1.09)   08/22/19  17:22    














Problem List





- Problems


(1) Hyperkalemia


Code(s): E87.5 - HYPERKALEMIA   





(2) Hypernatremia


Code(s): E87.0 - HYPEROSMOLALITY AND HYPERNATREMIA   





(3) SBO (small bowel obstruction)


Code(s): K56.609 - UNSP INTESTNL OBST, UNSP AS TO PARTIAL VERSUS COMPLETE OBST 

  





(4) Seizure


Code(s): R56.9 - UNSPECIFIED CONVULSIONS   





Assessment/Plan


 Current Medications











Generic Name Dose Route Start Last Admin





  Trade Name Freq  PRN Reason Stop Dose Admin


 


Acetaminophen  650 mg  08/24/19 14:13  08/28/19 05:08





  Tylenol Suppository -  [place]   650 mg





  Q6H PRN   Administration





  FEVER   





     





     





     


 


Albuterol Sulfate  1 amp  08/24/19 14:13  





  Ventolin 0.083% Nebulizer Soln -  NEB   





  Q4H PRN   





  SHORTNESS OF BREATH   





     





     





     


 


Piperacillin Sod/Tazobactam  50 mls @ 100 mls/hr  08/24/19 18:00  08/28/19 11:29





  Sod 3.375 gm/ Dextrose  IVPB   100 mls/hr





  Q8H-IV ROSE MARIE   Administration





     





     





  Protocol   





     


 


Amino Acids  1,000 mls @ 60 mls/hr  08/27/19 16:14  08/28/19 04:29





  Clinimix -  IV   Not Given





  Q12H ROSE MARIE   





     





     





     





     


 


Levetiracetam  500 mg  08/24/19 22:00  08/28/19 11:30





  Keppra Injection -  IVPB   500 mg





  BID ROSE MARIE   Administration





     





     





     





     


 


Lorazepam  0.25 mg  08/25/19 06:00  08/27/19 01:51





  Ativan Injection -  IVPB   0.25 mg





  DAILY PRN   Administration





  ANXIETY   





     





     





     


 


Metoprolol Tartrate  5 mg  08/24/19 15:49  08/25/19 06:54





  Lopressor Injection -  IVPUSH   5 mg





  Q4H PRN   Administration





  TACHYCARDIA   





     





     





     


 


Morphine Sulfate  0.5 mg  08/27/19 11:28  08/28/19 05:30





  Morphine Sulfate  IVPB   0.5 mg





  Q4H PRN   Administration





  PAIN LEVEL 6-10   





     





     





     











Impression


1. hypernatremia


2. hyperkalemia - resolved


3. SBO


4. epilepsy


5. developemental delay


6. hypokalemia





Plan


- replace potassium


- mag stable


- ad potassium to clinimix


- surgery follow up


- discussed with medical team

## 2019-08-28 NOTE — PN
Progress Note (short form)





- Note


Progress Note: 











Pt seen and examined. Sister bedside. Reports pts pain appears to be improved 

with Morphine. NG output 1000ml from 1pm to 830AM. No other issues overnight. 














 Vital Signs











Temp  97.9 F   08/28/19 06:00


 


Pulse  133 H  08/28/19 05:22


 


Resp  20   08/28/19 05:22


 


BP  111/71   08/28/19 05:22


 


Pulse Ox  100   08/27/19 21:00








 Intake & Output











 08/27/19 08/27/19 08/28/19





 11:59 23:59 11:59


 


Intake Total 588 734 520


 


Output Total   1000


 


Balance 588 734 -480


 


Intake:   


 


   384 420


 


    Clinimix 588 384 420


 


  IVPB  350 100


 


Output:   


 


  Gastric Drainage   1000


 


Other:   


 


  Voiding Method Diaper Diaper 


 


  # Unmeasured Voids   


 


    Void  3 3








 CBC, BMP





 08/28/19 05:45 





 08/28/19 05:45 








Gen: Patient resting comfortably but appears restless this morning. 


Ext: Extremities contracted x4


Abd: Soft, nontender, moderately distended,  no guarding, no rebound.  Healed 

midline surgical scar and lateral scar. 














A/P: 58 y/o F resident of [facility], w/ PMHx MR, cerebral palsy, 

congenital quadriplegia, severe scoliosis, seizure disorder, and GERD now a/w 

SBO.





NGT put out 1000ml from 1pm yesterday until 830AM (bilious output)


Abdomen moderately distended, pain appears more controlled per pt. 





-Small bowel series this AM, awaiting full images/report


-NPO/NGT- maintained at low continuous suction


-IVF


-Serial abd exams 


-Surgery to follow





Evaluation and plan discussed with Dr Alfaro

## 2019-08-29 LAB
ALBUMIN SERPL-MCNC: 2.2 G/DL (ref 3.4–5)
ALP SERPL-CCNC: 90 U/L (ref 45–117)
ALT SERPL-CCNC: 9 U/L (ref 13–61)
ANION GAP SERPL CALC-SCNC: 6 MMOL/L (ref 8–16)
ANISOCYTOSIS BLD QL: (no result)
AST SERPL-CCNC: 20 U/L (ref 15–37)
BASOPHILS # BLD: 0.3 % (ref 0–2)
BILIRUB SERPL-MCNC: 0.4 MG/DL (ref 0.2–1)
BUN SERPL-MCNC: 18.3 MG/DL (ref 7–18)
CALCIUM SERPL-MCNC: 9.3 MG/DL (ref 8.5–10.1)
CHLORIDE SERPL-SCNC: 100 MMOL/L (ref 98–107)
CO2 SERPL-SCNC: 32 MMOL/L (ref 21–32)
CREAT SERPL-MCNC: 0.3 MG/DL (ref 0.55–1.3)
DEPRECATED RDW RBC AUTO: 14.2 % (ref 11.6–15.6)
EOSINOPHIL # BLD: 0 % (ref 0–4.5)
GLUCOSE SERPL-MCNC: 133 MG/DL (ref 74–106)
HCT VFR BLD CALC: 33.5 % (ref 32.4–45.2)
HGB BLD-MCNC: 10.9 GM/DL (ref 10.7–15.3)
LYMPHOCYTES # BLD: 7.9 % (ref 8–40)
MACROCYTES BLD QL: 0
MAGNESIUM SERPL-MCNC: 2.3 MG/DL (ref 1.8–2.4)
MCH RBC QN AUTO: 27.9 PG (ref 25.7–33.7)
MCHC RBC AUTO-ENTMCNC: 32.5 G/DL (ref 32–36)
MCV RBC: 85.7 FL (ref 80–96)
MONOCYTES # BLD AUTO: 11.6 % (ref 3.8–10.2)
NEUTROPHILS # BLD: 80.2 % (ref 42.8–82.8)
PLATELET # BLD AUTO: 211 K/MM3 (ref 134–434)
PLATELET BLD QL SMEAR: NORMAL
PMV BLD: 9.1 FL (ref 7.5–11.1)
POTASSIUM SERPLBLD-SCNC: 3.7 MMOL/L (ref 3.5–5.1)
PROT SERPL-MCNC: 5.9 G/DL (ref 6.4–8.2)
RBC # BLD AUTO: 3.92 M/MM3 (ref 3.6–5.2)
SODIUM SERPL-SCNC: 137 MMOL/L (ref 136–145)
WBC # BLD AUTO: 8.1 K/MM3 (ref 4–10)

## 2019-08-29 RX ADMIN — I.V. FAT EMULSION SCH MLS/HR: 20 EMULSION INTRAVENOUS at 22:29

## 2019-08-29 RX ADMIN — PIPERACILLIN SODIUM,TAZOBACTAM SODIUM SCH MLS/HR: 3; .375 INJECTION, POWDER, FOR SOLUTION INTRAVENOUS at 17:28

## 2019-08-29 RX ADMIN — PIPERACILLIN SODIUM,TAZOBACTAM SODIUM SCH MLS/HR: 3; .375 INJECTION, POWDER, FOR SOLUTION INTRAVENOUS at 10:38

## 2019-08-29 RX ADMIN — POTASSIUM CHLORIDE SCH MLS/HR: 149 INJECTION, SOLUTION, CONCENTRATE INTRAVENOUS at 13:41

## 2019-08-29 RX ADMIN — LEVETIRACETAM SCH MG: 100 INJECTION, SOLUTION, CONCENTRATE INTRAVENOUS at 10:38

## 2019-08-29 RX ADMIN — PIPERACILLIN SODIUM,TAZOBACTAM SODIUM SCH MLS/HR: 3; .375 INJECTION, POWDER, FOR SOLUTION INTRAVENOUS at 01:05

## 2019-08-29 RX ADMIN — LEVETIRACETAM SCH MG: 100 INJECTION, SOLUTION, CONCENTRATE INTRAVENOUS at 21:54

## 2019-08-29 RX ADMIN — POTASSIUM CHLORIDE SCH: 149 INJECTION, SOLUTION, CONCENTRATE INTRAVENOUS at 01:04

## 2019-08-29 NOTE — PN
Progress Note (short form)





- Note


Progress Note: 


S: nonverbal


  


 Current Medications











Generic Name Dose Route Start Last Admin





  Trade Name Freq  PRN Reason Stop Dose Admin


 


Acetaminophen  650 mg  08/24/19 14:13  08/28/19 05:08





  Tylenol Suppository -  [place]   650 mg





  Q6H PRN   Administration





  FEVER   





     





     





     


 


Albuterol Sulfate  1 amp  08/24/19 14:13  





  Ventolin 0.083% Nebulizer Soln -  NEB   





  Q4H PRN   





  SHORTNESS OF BREATH   





     





     





     


 


Piperacillin Sod/Tazobactam  50 mls @ 100 mls/hr  08/24/19 18:00  08/29/19 01:05





  Sod 3.375 gm/ Dextrose  IVPB   100 mls/hr





  Q8H-IV ROSE MARIE   Administration





     





     





  Protocol   





     


 


Potassium Chloride 20 meq/  1,010 mls @ 60 mls/hr  08/28/19 13:00  08/29/19 01:

04





  Amino Acids  IVPB   Not Given





  Q12H ROSE MARIE   





     





     





     





     


 


Levetiracetam  500 mg  08/24/19 22:00  08/28/19 21:49





  Keppra Injection -  IVPB   500 mg





  BID ROSE MARIE   Administration





     





     





     





     


 


Lorazepam  0.25 mg  08/25/19 06:00  08/27/19 01:51





  Ativan Injection -  IVPB   0.25 mg





  DAILY PRN   Administration





  ANXIETY   





     





     





     


 


Metoprolol Tartrate  5 mg  08/24/19 15:49  08/25/19 06:54





  Lopressor Injection -  IVPUSH   5 mg





  Q4H PRN   Administration





  TACHYCARDIA   





     





     





     


 


Morphine Sulfate  0.5 mg  08/27/19 11:28  08/28/19 23:47





  Morphine Sulfate  IVPB   0.5 mg





  Q4H PRN   Administration





  PAIN LEVEL 6-10   





     





     





     








 Vital Signs











 Period  Temp  Pulse  Resp  BP Sys/Carlson  Pulse Ox


 


 Last 24 Hr  97.5 F-98.5 F  121-128  18-20  /67-88  100








Gen: NAD


Cardiovascular: Yes: Regular Rate and Rhythm


Respiratory: Yes: Rhonchi


Gastrointestinal: Yes: Soft


Edema: No


no jaundice diaphoresis


not agitated





- ....Imaging


EKG: Image Reviewed





 CBC, BMP





 08/29/19 09:15 





 08/29/19 09:15 








Assessment/Plan





DATA:








tele: sinus tachycardia





echo 8/2019  nl LV function, E/A reversal, RV not well visualized, mild MR





IMP:


Sinus tachycardia:


- sinus, monitoring on tele


- echo unremarkable


- likely in setting of underlying pain, anxiety, SBO - manage per primary, HR 

improving


- trop neg, no ischemia on EKG - unlikely ACS





SBO:


- manage per surgery, NGT in place





Seizure disorder:


- manage per primary





GERD:


-holding meds, NPO

## 2019-08-29 NOTE — PN
Progress Note, Physician


History of Present Illness: 





Pt seen and examined at bedside. She appears more comfortable today. 





- Current Medication List


Current Medications: 


Active Medications





Acetaminophen (Tylenol Suppository -)  650 mg [place] Q6H PRN


   PRN Reason: FEVER


   Last Admin: 08/28/19 05:08 Dose:  650 mg


Albuterol Sulfate (Ventolin 0.083% Nebulizer Soln -)  1 amp NEB Q4H PRN


   PRN Reason: SHORTNESS OF BREATH


Piperacillin Sod/Tazobactam (Sod 3.375 gm/ Dextrose)  50 mls @ 100 mls/hr IVPB 

Q8H-IV [facility]; Protocol


   Last Admin: 08/29/19 10:38 Dose:  100 mls/hr


Potassium Chloride 20 meq/ (Amino Acids)  1,010 mls @ 60 mls/hr IVPB Q12H [facility]


   Last Admin: 08/29/19 01:04 Dose:  Not Given


Fat Emulsion Intravenous (Intralipid -)  250 mls @ 20.833 mls/hr IV DAILY@2200 

[facility]


Levetiracetam (Keppra Injection -)  500 mg IVPB BID [facility]


   Last Admin: 08/29/19 10:38 Dose:  500 mg


Metoprolol Tartrate (Lopressor Injection -)  5 mg IVPUSH Q4H PRN


   PRN Reason: TACHYCARDIA


   Last Admin: 08/25/19 06:54 Dose:  5 mg


Morphine Sulfate (Morphine Sulfate)  0.5 mg IVPB Q4H PRN


   PRN Reason: PAIN LEVEL 6-10


   Last Admin: 08/28/19 23:47 Dose:  0.5 mg











- Objective


Vital Signs: 


 Vital Signs











Temperature  97.8 F   08/29/19 02:00


 


Pulse Rate  121 H  08/29/19 07:39


 


Respiratory Rate  20   08/29/19 07:39


 


Blood Pressure  111/73   08/29/19 07:39


 


O2 Sat by Pulse Oximetry (%)  100   08/28/19 21:00











Constitutional: Yes: Calm


Eyes: Yes: Conjunctiva Clear


HENT: Yes: Atraumatic


Neck: Yes: Supple


Cardiovascular: Yes: S1, S2


Respiratory: Yes: CTA Bilaterally


Gastrointestinal: Yes: Soft, Other (ngt)


Genitourinary: Yes: Incontinence


Musculoskeletal: Yes: Other (contracted)


Edema: No


Neurological: Yes: Oriented


Psychiatric: Yes: Oriented


Labs: 


 CBC, BMP





 08/29/19 09:15 





 08/29/19 09:15 





 INR, PTT











INR  1.00  (0.82-1.09)   08/22/19  17:22    














Problem List





- Problems


(1) Hyperkalemia


Code(s): E87.5 - HYPERKALEMIA   





(2) Hypernatremia


Code(s): E87.0 - HYPEROSMOLALITY AND HYPERNATREMIA   





(3) SBO (small bowel obstruction)


Code(s): K56.609 - UNSP INTESTNL OBST, UNSP AS TO PARTIAL VERSUS COMPLETE OBST 

  





(4) Seizure


Code(s): R56.9 - UNSPECIFIED CONVULSIONS   





Assessment/Plan


 Current Medications











Generic Name Dose Route Start Last Admin





  Trade Name Freq  PRN Reason Stop Dose Admin


 


Acetaminophen  650 mg  08/24/19 14:13  08/28/19 05:08





  Tylenol Suppository -  WV   650 mg





  Q6H PRN   Administration





  FEVER   





     





     





     


 


Albuterol Sulfate  1 amp  08/24/19 14:13  





  Ventolin 0.083% Nebulizer Soln -  NEB   





  Q4H PRN   





  SHORTNESS OF BREATH   





     





     





     


 


Piperacillin Sod/Tazobactam  50 mls @ 100 mls/hr  08/24/19 18:00  08/29/19 10:38





  Sod 3.375 gm/ Dextrose  IVPB   100 mls/hr





  Q8H-IV [facility]   Administration





     





     





  Protocol   





     


 


Potassium Chloride 20 meq/  1,010 mls @ 60 mls/hr  08/28/19 13:00  08/29/19 01:

04





  Amino Acids  IVPB   Not Given





  Q12H RO[facility]   





     





     





     





     


 


Fat Emulsion Intravenous  250 mls @ 20.833 mls/hr  08/29/19 22:00  





  Intralipid -  IV   





  DAILY@2200 ROSE [facility]IE   





     





     





     





     


 


Levetiracetam  500 mg  08/24/19 22:00  08/29/19 10:38





  Keppra Injection -  IVPB   500 mg





  BID [facility]   Administration





     





     





     





     


 


Metoprolol Tartrate  5 mg  08/24/19 15:49  08/25/19 06:54





  Lopressor Injection -  IVPUSH   5 mg





  Q4H PRN   Administration





  TACHYCARDIA   





     





     





     


 


Morphine Sulfate  0.5 mg  08/27/19 11:28  08/28/19 23:47





  Morphine Sulfate  IVPB   0.5 mg





  Q4H PRN   Administration





  PAIN LEVEL 6-10   





     





     





     














Impression


1. hypernatremia


2. hyperkalemia - resolved


3. SBO


4. epilepsy


5. developemental delay


6. hypokalemia





Plan


- potassium is improved


- sodium stable


- cont clinimix


- surgery follow up for sbo

## 2019-08-29 NOTE — PN
Progress Note (short form)





- Note


Progress Note: 














Pt seen and examined. Aid at bedside. Reports pts pain appears to be stable, pt 

seemed to be in pain this morning, improved with repositioning. Pt had "two 

large bowel movements" yesterday. NG output ?800ml overnight. No other issues 

overnight. 














 Vital Signs











Temp  97.8 F   08/29/19 02:00


 


Pulse  121 H  08/29/19 07:39


 


Resp  20   08/29/19 07:39


 


BP  111/73   08/29/19 07:39


 


Pulse Ox  100   08/28/19 21:00








 Intake & Output











 08/28/19 08/28/19 08/29/19





 11:59 23:59 11:59


 


Intake Total 520 720 480


 


Output Total 1000 2000 


 


Balance -480 -1280 480


 


Intake:   


 


   120 480


 


    Clinimix 420 120 480


 


  IVPB 100 600 


 


Output:   


 


  Gastric Drainage 1000 2000 


 


Other:   


 


  Voiding Method Incontinent Incontinent Diaper


 


  # Unmeasured Voids   


 


    Void 3 1 3


 


  Bowel Movement  Yes No


 


  # Bowel Movements  1 








 CBC, BMP





 08/28/19 05:45 





 08/28/19 05:45 











Gen: Patient resting comfortably but appears restless this morning. 


Ext: Extremities contracted x4


Abd: Soft, nontender, moderately distended,  no guarding, no rebound.  Healed 

midline surgical scar and lateral scar. 














A/P: 58 y/o F resident of [facility], w/ PMHx MR, cerebral palsy, 

congenital quadriplegia, severe scoliosis, seizure disorder, and GERD now a/w 

SBO.





NGT put out ?800ml overnight (not documented-RN unsure) currently 300ml in 

reservoir bedside from ?6AM (bilious output)


Abdominal slightly less distended, pain appears more controlled per family. 





-AXR in AM: reviewed with attending continues to have distended sb 


-Glycerin suppository ordered


-NPO/NGT- maintained at low continuous suction, plan to place to gravity this 

afternoon, awaiting output 


-IVF


-Serial abd exams 


-Surgery to follow





d/w attending Dr Alfaro

## 2019-08-29 NOTE — PN
Progress Note, Physician


History of Present Illness: 





non verbal


sister in room


looks much calmer


had a bm


ng tube still draining





- Current Medication List


Current Medications: 


Active Medications





Acetaminophen (Tylenol Suppository -)  650 mg [place] Q6H PRN


   PRN Reason: FEVER


   Last Admin: 08/28/19 05:08 Dose:  650 mg


Albuterol Sulfate (Ventolin 0.083% Nebulizer Soln -)  1 amp NEB Q4H PRN


   PRN Reason: SHORTNESS OF BREATH


Piperacillin Sod/Tazobactam (Sod 3.375 gm/ Dextrose)  50 mls @ 100 mls/hr IVPB 

Q8H-IV [facility]; Protocol


   Last Admin: 08/29/19 10:38 Dose:  100 mls/hr


Potassium Chloride 20 meq/ (Amino Acids)  1,010 mls @ 60 mls/hr IVPB Q12H [facility]


   Last Admin: 08/29/19 01:04 Dose:  Not Given


Levetiracetam (Keppra Injection -)  500 mg IVPB BID [facility]


   Last Admin: 08/29/19 10:38 Dose:  500 mg


Lorazepam (Ativan Injection -)  0.25 mg IVPB DAILY PRN


   PRN Reason: ANXIETY


   Last Admin: 08/27/19 01:51 Dose:  0.25 mg


Metoprolol Tartrate (Lopressor Injection -)  5 mg IVPUSH Q4H PRN


   PRN Reason: TACHYCARDIA


   Last Admin: 08/25/19 06:54 Dose:  5 mg


Morphine Sulfate (Morphine Sulfate)  0.5 mg IVPB Q4H PRN


   PRN Reason: PAIN LEVEL 6-10


   Last Admin: 08/28/19 23:47 Dose:  0.5 mg











- Objective


Vital Signs: 


 Vital Signs











Temperature  97.8 F   08/29/19 02:00


 


Pulse Rate  121 H  08/29/19 07:39


 


Respiratory Rate  20   08/29/19 07:39


 


Blood Pressure  111/73   08/29/19 07:39


 


O2 Sat by Pulse Oximetry (%)  100   08/28/19 21:00











Constitutional: Yes: No Distress, Calm


Cardiovascular: Yes: S1, S2


Respiratory: Yes: Regular, CTA Bilaterally


Gastrointestinal: Yes: Soft, Hypoactive Bowel Sounds, Other (ng in place)


Musculoskeletal: Yes: WNL


Extremities: Yes: Other


Neurological: Yes: Alert


Labs: 


 CBC, BMP





 08/29/19 09:15 





 08/29/19 09:15 





 INR, PTT











INR  1.00  (0.82-1.09)   08/22/19  17:22    














Assessment/Plan





59 year-old female resident of [facility], with a PMH significant for 

profound mental retardation, cerebral palsy, congenital quadriplegia, severe 

scoliosis, seizure disorder, and GERD. Admitted for SBO.





SBO


Profound mental retardation


Cerebral palsy


Congenital quadriplegia


Severe scoliosis


Seizure disorder


GERD





plan


npo


hydration


ng suctioning


will see tomorrow


will deescalate abx tomorrow

## 2019-08-30 LAB
ALBUMIN SERPL-MCNC: 2.3 G/DL (ref 3.4–5)
ALP SERPL-CCNC: 130 U/L (ref 45–117)
ALT SERPL-CCNC: 13 U/L (ref 13–61)
ANION GAP SERPL CALC-SCNC: 5 MMOL/L (ref 8–16)
ANISOCYTOSIS BLD QL: 0
AST SERPL-CCNC: 31 U/L (ref 15–37)
BASOPHILS # BLD: 0.4 % (ref 0–2)
BILIRUB SERPL-MCNC: 0.5 MG/DL (ref 0.2–1)
BUN SERPL-MCNC: 20.2 MG/DL (ref 7–18)
CALCIUM SERPL-MCNC: 8.9 MG/DL (ref 8.5–10.1)
CHLORIDE SERPL-SCNC: 93 MMOL/L (ref 98–107)
CO2 SERPL-SCNC: 33 MMOL/L (ref 21–32)
CREAT SERPL-MCNC: 0.2 MG/DL (ref 0.55–1.3)
DEPRECATED RDW RBC AUTO: 14.1 % (ref 11.6–15.6)
EOSINOPHIL # BLD: 0 % (ref 0–4.5)
GLUCOSE SERPL-MCNC: 120 MG/DL (ref 74–106)
HCT VFR BLD CALC: 31.3 % (ref 32.4–45.2)
HGB BLD-MCNC: 10.6 GM/DL (ref 10.7–15.3)
LYMPHOCYTES # BLD: 10.6 % (ref 8–40)
MACROCYTES BLD QL: 0
MAGNESIUM SERPL-MCNC: 2 MG/DL (ref 1.8–2.4)
MCH RBC QN AUTO: 28.5 PG (ref 25.7–33.7)
MCHC RBC AUTO-ENTMCNC: 33.8 G/DL (ref 32–36)
MCV RBC: 84.4 FL (ref 80–96)
MONOCYTES # BLD AUTO: 11.9 % (ref 3.8–10.2)
NEUTROPHILS # BLD: 77.1 % (ref 42.8–82.8)
PLATELET # BLD AUTO: 244 K/MM3 (ref 134–434)
PLATELET BLD QL SMEAR: NORMAL
PMV BLD: 9.5 FL (ref 7.5–11.1)
POTASSIUM SERPLBLD-SCNC: 3.7 MMOL/L (ref 3.5–5.1)
PROT SERPL-MCNC: 6 G/DL (ref 6.4–8.2)
RBC # BLD AUTO: 3.71 M/MM3 (ref 3.6–5.2)
SODIUM SERPL-SCNC: 131 MMOL/L (ref 136–145)
WBC # BLD AUTO: 12.2 K/MM3 (ref 4–10)

## 2019-08-30 RX ADMIN — LEVETIRACETAM SCH MG: 100 INJECTION, SOLUTION, CONCENTRATE INTRAVENOUS at 21:38

## 2019-08-30 RX ADMIN — I.V. FAT EMULSION SCH MLS/HR: 20 EMULSION INTRAVENOUS at 21:38

## 2019-08-30 RX ADMIN — LEVETIRACETAM SCH MG: 100 INJECTION, SOLUTION, CONCENTRATE INTRAVENOUS at 11:31

## 2019-08-30 RX ADMIN — PIPERACILLIN SODIUM,TAZOBACTAM SODIUM SCH MLS/HR: 3; .375 INJECTION, POWDER, FOR SOLUTION INTRAVENOUS at 21:37

## 2019-08-30 RX ADMIN — POTASSIUM CHLORIDE SCH MLS/HR: 149 INJECTION, SOLUTION, CONCENTRATE INTRAVENOUS at 22:02

## 2019-08-30 RX ADMIN — PIPERACILLIN SODIUM,TAZOBACTAM SODIUM SCH MLS/HR: 3; .375 INJECTION, POWDER, FOR SOLUTION INTRAVENOUS at 11:30

## 2019-08-30 RX ADMIN — POTASSIUM CHLORIDE SCH: 149 INJECTION, SOLUTION, CONCENTRATE INTRAVENOUS at 01:10

## 2019-08-30 RX ADMIN — PIPERACILLIN SODIUM,TAZOBACTAM SODIUM SCH MLS/HR: 3; .375 INJECTION, POWDER, FOR SOLUTION INTRAVENOUS at 01:07

## 2019-08-30 RX ADMIN — POTASSIUM CHLORIDE SCH MLS/HR: 149 INJECTION, SOLUTION, CONCENTRATE INTRAVENOUS at 14:27

## 2019-08-30 RX ADMIN — PIPERACILLIN SODIUM,TAZOBACTAM SODIUM SCH: 3; .375 INJECTION, POWDER, FOR SOLUTION INTRAVENOUS at 18:12

## 2019-08-30 NOTE — PN
Progress Note, Physician


Chief Complaint: 








patient non-verbal. Family at bedside , said she had a good night. Placed on 

stomach last night and had BM





History of Present Illness: 











History of Present Illness: 





Patient is a 59 year old female (resident of [facility]) with a PMHx 

significant for profound mental retardation, cerebral palsy, congenital 

quadriplegia, severe scoliosis, seizure disorder, and GERD.  Transferred from 

[place] to [facility] for further management of SBO, sepsis and tachycardia.  





- Current Medication List


Current Medications: 


Active Medications





Acetaminophen (Tylenol Suppository -)  650 mg [place] Q6H PRN


   PRN Reason: FEVER


   Last Admin: 08/28/19 05:08 Dose:  650 mg


Piperacillin Sod/Tazobactam (Sod 3.375 gm/ Dextrose)  50 mls @ 100 mls/hr IVPB 

Q8H-IV [facility]; Protocol


   Last Admin: 08/30/19 01:07 Dose:  100 mls/hr


Potassium Chloride 20 meq/ (Amino Acids)  1,010 mls @ 60 mls/hr IVPB Q12H [facility]


   Last Admin: 08/30/19 01:10 Dose:  Not Given


Fat Emulsion Intravenous (Intralipid -)  250 mls @ 20.833 mls/hr IV DAILY@2200 

RO[facility]IE


   Last Admin: 08/29/19 22:29 Dose:  20.833 mls/hr


Levetiracetam (Keppra Injection -)  500 mg IVPB BID [facility]


   Last Admin: 08/29/19 21:54 Dose:  500 mg


Metoprolol Tartrate (Lopressor Injection -)  5 mg IVPUSH Q4H PRN


   PRN Reason: TACHYCARDIA


   Last Admin: 08/25/19 06:54 Dose:  5 mg


Morphine Sulfate (Morphine Sulfate)  0.5 mg IVPB Q4H PRN


   PRN Reason: PAIN LEVEL 6-10


   Last Admin: 08/28/19 23:47 Dose:  0.5 mg











- Objective


Vital Signs: 


 Vital Signs











Temperature  98.7 F   08/30/19 06:00


 


Pulse Rate  104 H  08/30/19 06:00


 


Respiratory Rate  18   08/30/19 06:00


 


Blood Pressure  118/68   08/30/19 06:00


 


O2 Sat by Pulse Oximetry (%)  100   08/29/19 21:00











Constitutional: Yes: No Distress, Calm, Thin


Eyes: Yes: WNL, Conjunctiva Clear, EOM Intact


HENT: Yes: WNL, Atraumatic, Normocephalic


Neck: Yes: WNL, Supple, Trachea Midline


Cardiovascular: Yes: Regular Rate and Rhythm, Tachycardia


Respiratory: Yes: Regular, Diminished (at bases)


Gastrointestinal: Yes: Soft, Hypoactive Bowel Sounds, Other (NGT to gravity 

with bilious draiange)


...Rectal Exam: Yes: Deferred


Genitourinary: Yes: Incontinence


Breast(s): Yes: WNL


Musculoskeletal: Yes: Other (not able to assess)


Extremities: Yes: Deformity (severe cobtractures with scolosis), Other


Edema: Yes


Edema: LLE: Trace, RLE: Trace


Peripheral Pulses WNL: Yes


Peripheral Pulses: Left Radial: 2+, Right Radial: 2+, Left Doralis Pedis: 2+, 

Right Dorsalis Pedis: 2+, Left Femoral: 2+, Right Femoral: 2+


Integumentary: Yes: WNL


Neurological: Yes: Alert (tracks voices)


Psychiatric: Yes: Alert


Labs: 


 CBC, BMP





 08/29/19 09:15 





 08/29/19 09:15 





 INR, PTT











INR  1.00  (0.82-1.09)   08/22/19  17:22    














- ....Imaging


X-ray: Report Reviewed (GI series)





Problem List





- Problems


(1) SBO (small bowel obstruction)


Assessment/Plan: 


NPO/NGT- placed to gravity drainage this morning


-IVF continue


- serial abd exams/KUB


-Surgery Dr Alfaro following


Code(s): K56.609 - UNSP INTESTNL OBST, UNSP AS TO PARTIAL VERSUS COMPLETE OBST 

  





(2) Hypokalemia


Assessment/Plan: 


K 3., maintaining levels


continue to monitor K, maintain >4.0


clinimix changed by Dr Huizar with increased potassium


Code(s): E87.6 - HYPOKALEMIA   





(3) Prophylactic measure


Assessment/Plan: 


FEN


NPO 


c/w Climinix 


monitor electrolytes





Dispo 


maintain on tele


full code


discharge planning











Code(s): Z29.9 - ENCOUNTER FOR PROPHYLACTIC MEASURES, UNSPECIFIED   





(4) Seizure


Assessment/Plan: 


on keppra.  


on tegretol in NH, continue keppra iv bid.  once no longer npo, can restart 

tegretol. 





Code(s): R56.9 - UNSPECIFIED CONVULSIONS   





(5) Sepsis


Assessment/Plan: 


wbc increased to 12, afebrile


monitor respiratory status.  


c/w 2 L NC


blood cultures NGTD


ID following, cont IV abx 


Rhonchi of upper lobes improving.  


monitor output & temp


Code(s): A41.9 - SEPSIS, UNSPECIFIED ORGANISM   





(6) Aspiration into airway


Assessment/Plan: 


chest xray shows left lower lobe infiltrate


c/w zosyn.


support with 2 liters of nasal cannula


monitor oxygen levels and titrate off oxygen as tolerated.  not home oxygen 

dependent at home. 


Code(s): T17.908A - UNSP FB IN RESP TRACT, PART UNSP CAUSING OTH INJURY, INIT   





(7) Cerebral palsy


Assessment/Plan: 


supportive care


BL UE severly contracted. IV placed to R AC. IV site is without erythema, not 

tender. IV placement attempted with ultrasound yesterday unsuccessfully and 

family refused to attempt EJ placement. Since IV site is without signs of 

infection and is flushing continue with current IV despite being in for > 72 

hours. Will continue to monitor site. 


Code(s): G80.9 - CEREBRAL PALSY, UNSPECIFIED   





(8) Left pulmonary infiltrate on CXR


Assessment/Plan: 


CXR shows left lower lobe infiltrate.  


c/w zosyn.


support with 2 liters of nasal cannula


monitor oxygen levels and titrate off oxygen as tolerated.  not oxygen 

dependent at home. 


Code(s): R91.8 - OTHER NONSPECIFIC ABNORMAL FINDING OF LUNG FIELD   





(9) Tachycardia


Assessment/Plan: 


HR remains in 120s despite increased frequency of MSO4


c/w MSO4 .5mg IV q8H





 


Code(s): R00.0 - TACHYCARDIA, UNSPECIFIED   





(10) Decreased oral intake


Assessment/Plan: 


NPO.  day #7 of NPO, c/w  Clinimax for nutrition.  


speech and swallow consulted and will assess once taking PO


Code(s): R63.8 - OTHER SYMPTOMS AND SIGNS CONCERNING FOOD AND FLUID INTAKE   





(11) Hyponatremia


Assessment/Plan: 


Na decreased to 131, will adjust Clinimix accordingly





Code(s): E87.1 - HYPO-OSMOLALITY AND HYPONATREMIA   





Visit type





- Emergency Visit


Emergency Visit: Yes


ED Registration Date: 08/22/19


Care time: The patient presented to the Emergency Department on the above date 

and was hospitalized for further evaluation of their emergent condition.





- New Patient


This patient is new to me today: No





- Critical Care


Critical Care patient: No





- Discharge Referral


Referred to [facility] No

## 2019-08-30 NOTE — PN
Progress Note (short form)





- Note


Progress Note: 











Pt seen and examined. Aid at bedside. Reports pts pain appears to be stable, pt 

seemed to be in pain this morning, improved with repositioning. Pt had "two 

large bowel movements" yesterday. NG output 150ml overnight(TUBE TO GRAVITY). 

No other issues overnight. 











                   Vital Signs











Temp  98.7 F   08/30/19 06:00


 


Pulse  104 H  08/30/19 06:00


 


Resp  18   08/30/19 06:00


 


BP  118/68   08/30/19 06:00


 


Pulse Ox  100   08/29/19 21:00








 Intake & Output











 08/29/19 08/29/19 08/30/19





 11:59 23:59 11:59


 


Intake Total 480 910 658


 


Output Total  400 


 


Balance 480 510 658


 


Intake:   


 


   660 420


 


    Clinimix 480 660 420


 


  IVPB  250 50


 


  Lipid   188


 


Output:   


 


  Gastric Drainage  400 


 


Other:   


 


  Voiding Method Incontinent Incontinent 


 


  # Unmeasured Voids   


 


    Void 3 3 3


 


  Bowel Movement No Yes: small Yes


 


  # Bowel Movements  1 1


 


  Body Mass Index (BMI) 12.1  








 CBC, BMP





 08/29/19 09:15 





 08/29/19 09:15 














Gen: Patient resting comfortably but appears restless this morning. 


Ext: Extremities contracted x4


Abd: Soft, nontender, less distended than yesterday,  no guarding, no rebound.  

Healed midline surgical scar and lateral scar. 














A/P: 60 y/o F resident of [facility], w/ PMHx MR, cerebral palsy, 

congenital quadriplegia, severe scoliosis, seizure disorder, and GERD now a/w 

SBO.





NGT put out 150ml to gravity since yesterday afternoon, (bilious output)


Abdomen slightly less distended, pain appears more controlled per family. 





-AXR in AM-pending


-NPO/NGT- to gravity, follow output, will likely remove this afternoon


-IVF


-Serial abd exams 


-Surgery to follow





d/w attending Dr Alfaro

## 2019-08-30 NOTE — PN
Progress Note, Physician


Chief Complaint: 





nonverbal


NGT in place








TELE: CAROLINE HORTONR





- Current Medication List


Current Medications: 


Active Medications





Acetaminophen (Tylenol Suppository -)  650 mg [place] Q6H PRN


   PRN Reason: FEVER


   Last Admin: 08/28/19 05:08 Dose:  650 mg


Piperacillin Sod/Tazobactam (Sod 3.375 gm/ Dextrose)  50 mls @ 100 mls/hr IVPB 

Q8H-IV RO[facility]IE; Protocol


   Last Admin: 08/30/19 01:07 Dose:  100 mls/hr


Potassium Chloride 20 meq/ (Amino Acids)  1,010 mls @ 60 mls/hr IVPB Q12H ROSE MARIE


   Last Admin: 08/30/19 01:10 Dose:  Not Given


Fat Emulsion Intravenous (Intralipid -)  250 mls @ 20.833 mls/hr IV DAILY@2200 

ROSE MARIE


   Last Admin: 08/29/19 22:29 Dose:  20.833 mls/hr


Levetiracetam (Keppra Injection -)  500 mg IVPB BID ROSE MARIE


   Last Admin: 08/29/19 21:54 Dose:  500 mg


Metoprolol Tartrate (Lopressor Injection -)  5 mg IVPUSH Q4H PRN


   PRN Reason: TACHYCARDIA


   Last Admin: 08/25/19 06:54 Dose:  5 mg


Morphine Sulfate (Morphine Sulfate)  0.5 mg IVPB Q4H PRN


   PRN Reason: PAIN LEVEL 6-10


   Last Admin: 08/28/19 23:47 Dose:  0.5 mg











- Objective


Vital Signs: 


 Vital Signs











Temperature  98.7 F   08/30/19 06:00


 


Pulse Rate  104 H  08/30/19 06:00


 


Respiratory Rate  18   08/30/19 06:00


 


Blood Pressure  118/68   08/30/19 06:00


 


O2 Sat by Pulse Oximetry (%)  100   08/29/19 21:00











Constitutional: Yes: No Distress


Cardiovascular: Yes: Regular Rate and Rhythm


Respiratory: Yes: Other (no wheezing or rales)


Gastrointestinal: Yes: Soft


Edema: No


Labs: 


 CBC, BMP





 08/29/19 09:15 





 08/29/19 09:15 





 INR, PTT











INR  1.00  (0.82-1.09)   08/22/19  17:22    














- ....Imaging


EKG: Image Reviewed





Assessment/Plan





tele: sinus tachycardia





echo 8/2019  nl LV function, E/A reversal, RV not well visualized, mild MR





IMP:


Sinus tachycardia:


- sinus, monitoring on tele


- echo unremarkable


- likely in setting of underlying pain, anxiety, SBO - manage per primary, HR 

improving


- trop neg, no ischemia on EKG - unlikely ACS





SBO:


- manage per surgery, NGT in place





Seizure disorder:


- manage per primary





GERD:


-holding meds, NPO

## 2019-08-30 NOTE — PN
Progress Note, Physician


History of Present Illness: 





stable


had bm overnight





- Current Medication List


Current Medications: 


Active Medications





Acetaminophen (Tylenol Suppository -)  650 mg SC Q6H PRN


   PRN Reason: FEVER


   Last Admin: 08/28/19 05:08 Dose:  650 mg


Piperacillin Sod/Tazobactam (Sod 3.375 gm/ Dextrose)  50 mls @ 100 mls/hr IVPB 

Q8H-IV ROSE MARIE; Protocol


   Last Admin: 08/30/19 01:07 Dose:  100 mls/hr


Potassium Chloride 20 meq/ (Amino Acids)  1,010 mls @ 60 mls/hr IVPB Q12H ROSE MARIE


   Last Admin: 08/30/19 01:10 Dose:  Not Given


Fat Emulsion Intravenous (Intralipid -)  250 mls @ 20.833 mls/hr IV DAILY@2200 

ROSE MARIE


   Last Admin: 08/29/19 22:29 Dose:  20.833 mls/hr


Levetiracetam (Keppra Injection -)  500 mg IVPB BID ROSE MARIE


   Last Admin: 08/29/19 21:54 Dose:  500 mg


Metoprolol Tartrate (Lopressor Injection -)  5 mg IVPUSH Q4H PRN


   PRN Reason: TACHYCARDIA


   Last Admin: 08/25/19 06:54 Dose:  5 mg


Morphine Sulfate (Morphine Sulfate)  0.5 mg IVPB Q4H PRN


   PRN Reason: PAIN LEVEL 6-10


   Last Admin: 08/28/19 23:47 Dose:  0.5 mg











- Objective


Vital Signs: 


 Vital Signs











Temperature  98.7 F   08/30/19 06:00


 


Pulse Rate  104 H  08/30/19 06:00


 


Respiratory Rate  18   08/30/19 06:00


 


Blood Pressure  118/68   08/30/19 06:00


 


O2 Sat by Pulse Oximetry (%)  100   08/29/19 21:00











Constitutional: Yes: No Distress, Calm


Cardiovascular: Yes: S1, S2


Respiratory: Yes: Regular, CTA Bilaterally


Gastrointestinal: Yes: Normal Bowel Sounds, Soft, Other (ng in place)


Musculoskeletal: Yes: WNL


Extremities: Yes: Other


Neurological: Yes: Alert, Oriented


Psychiatric: Yes: Alert, Oriented


Labs: 


 CBC, BMP





 08/30/19 08:40 





 08/30/19 08:40 





 INR, PTT











INR  1.00  (0.82-1.09)   08/22/19  17:22    














Assessment/Plan





59 year-old female resident of [facility], with a PMH significant for 

profound mental retardation, cerebral palsy, congenital quadriplegia, severe 

scoliosis, seizure disorder, and GERD. Admitted for SBO.





SBO


Profound mental retardation


Cerebral palsy


Congenital quadriplegia


Severe scoliosis


Seizure disorder


GERD





plan


continue current mgmt


wbc has increased


will watch on abx for one more day


will stop it tomorrow

## 2019-08-30 NOTE — PN
Progress Note, Physician


History of Present Illness: 





Pt seen and examined at bedside. She is not on the clinimix at the moment. 





- Current Medication List


Current Medications: 


Active Medications





Acetaminophen (Tylenol Suppository -)  650 mg [place] Q6H PRN


   PRN Reason: FEVER


   Last Admin: 08/28/19 05:08 Dose:  650 mg


Piperacillin Sod/Tazobactam (Sod 3.375 gm/ Dextrose)  50 mls @ 100 mls/hr IVPB 

Q8H-IV ROSE [facility]IE; Protocol


   Last Admin: 08/30/19 11:30 Dose:  100 mls/hr


Potassium Chloride 20 meq/ (Amino Acids)  1,010 mls @ 60 mls/hr IVPB Q12H [facility]


   Last Admin: 08/30/19 01:10 Dose:  Not Given


Fat Emulsion Intravenous (Intralipid -)  250 mls @ 20.833 mls/hr IV DAILY@2200 

ROSE MARIE


   Last Admin: 08/29/19 22:29 Dose:  20.833 mls/hr


Levetiracetam (Keppra Injection -)  500 mg IVPB BID ROSE [facility]


   Last Admin: 08/30/19 11:31 Dose:  500 mg


Metoprolol Tartrate (Lopressor Injection -)  5 mg IVPUSH Q4H PRN


   PRN Reason: TACHYCARDIA


   Last Admin: 08/25/19 06:54 Dose:  5 mg


Morphine Sulfate (Morphine Sulfate)  0.5 mg IVPB Q4H PRN


   PRN Reason: PAIN LEVEL 6-10


   Last Admin: 08/28/19 23:47 Dose:  0.5 mg











- Objective


Vital Signs: 


 Vital Signs











Temperature  98.7 F   08/30/19 06:00


 


Pulse Rate  104 H  08/30/19 06:00


 


Respiratory Rate  18   08/30/19 06:00


 


Blood Pressure  118/68   08/30/19 06:00


 


O2 Sat by Pulse Oximetry (%)  100   08/29/19 21:00











Constitutional: Yes: Calm


Eyes: Yes: Conjunctiva Clear


Cardiovascular: Yes: S1, S2


Gastrointestinal: Yes: Soft, Other (ngt in place)


Genitourinary: Yes: Incontinence


Musculoskeletal: Yes: Muscle Weakness


Edema: No


Integumentary: Yes: WNL


Neurological: Yes: Pre-Existing Deficit


Labs: 


 CBC, BMP





 08/30/19 08:40 





 08/30/19 08:40 





 INR, PTT











INR  1.00  (0.82-1.09)   08/22/19  17:22    














Problem List





- Problems


(1) Hyperkalemia


Code(s): E87.5 - HYPERKALEMIA   





(2) Hypernatremia


Code(s): E87.0 - HYPEROSMOLALITY AND HYPERNATREMIA   





(3) SBO (small bowel obstruction)


Code(s): K56.609 - UNSP INTESTNL OBST, UNSP AS TO PARTIAL VERSUS COMPLETE OBST 

  





(4) Seizure


Code(s): R56.9 - UNSPECIFIED CONVULSIONS   





Assessment/Plan


 Current Medications











Generic Name Dose Route Start Last Admin





  Trade Name Freq  PRN Reason Stop Dose Admin


 


Acetaminophen  650 mg  08/24/19 14:13  08/28/19 05:08





  Tylenol Suppository -  KS   650 mg





  Q6H PRN   Administration





  FEVER   





     





     





     


 


Piperacillin Sod/Tazobactam  50 mls @ 100 mls/hr  08/24/19 18:00  08/30/19 11:30





  Sod 3.375 gm/ Dextrose  IVPB   100 mls/hr





  Q8H-IV ROSE [facility]   Administration





     





     





  Protocol   





     


 


Potassium Chloride 20 meq/  1,010 mls @ 60 mls/hr  08/28/19 13:00  08/30/19 01:

10





  Amino Acids  IVPB   Not Given





  Q12H ROSE MARIE   





     





     





     





     


 


Fat Emulsion Intravenous  250 mls @ 20.833 mls/hr  08/29/19 22:00  08/29/19 22:

29





  Intralipid -  IV   20.833 mls/hr





  DAILY@2200 ROSE MARIE   Administration





     





     





     





     


 


Levetiracetam  500 mg  08/24/19 22:00  08/30/19 11:31





  Keppra Injection -  IVPB   500 mg





  BID ROSE MARIE   Administration





     





     





     





     


 


Metoprolol Tartrate  5 mg  08/24/19 15:49  08/25/19 06:54





  Lopressor Injection -  IVPUSH   5 mg





  Q4H PRN   Administration





  TACHYCARDIA   





     





     





     


 


Morphine Sulfate  0.5 mg  08/27/19 11:28  08/28/19 23:47





  Morphine Sulfate  IVPB   0.5 mg





  Q4H PRN   Administration





  PAIN LEVEL 6-10   





     





     





     














Impression


1. hypernatremia


2. hyperkalemia - resolved


3. SBO


4. epilepsy


5. developemental delay


6. hypokalemia





Plan


- restart fluids once IV is established


- monitor lytes closely


- surgery follow up for sbo


- discussed with family

## 2019-08-31 LAB
ALBUMIN SERPL-MCNC: 2.3 G/DL (ref 3.4–5)
ALP SERPL-CCNC: 173 U/L (ref 45–117)
ALT SERPL-CCNC: 19 U/L (ref 13–61)
ANION GAP SERPL CALC-SCNC: 7 MMOL/L (ref 8–16)
AST SERPL-CCNC: 39 U/L (ref 15–37)
BASOPHILS # BLD: 0.3 % (ref 0–2)
BILIRUB SERPL-MCNC: 0.7 MG/DL (ref 0.2–1)
BUN SERPL-MCNC: 19 MG/DL (ref 7–18)
CALCIUM SERPL-MCNC: 8.8 MG/DL (ref 8.5–10.1)
CHLORIDE SERPL-SCNC: 92 MMOL/L (ref 98–107)
CO2 SERPL-SCNC: 30 MMOL/L (ref 21–32)
CREAT SERPL-MCNC: 0.3 MG/DL (ref 0.55–1.3)
DEPRECATED RDW RBC AUTO: 14 % (ref 11.6–15.6)
EOSINOPHIL # BLD: 0 % (ref 0–4.5)
GLUCOSE SERPL-MCNC: 128 MG/DL (ref 74–106)
HCT VFR BLD CALC: 32.8 % (ref 32.4–45.2)
HGB BLD-MCNC: 11.3 GM/DL (ref 10.7–15.3)
LYMPHOCYTES # BLD: 8.2 % (ref 8–40)
MAGNESIUM SERPL-MCNC: 1.9 MG/DL (ref 1.8–2.4)
MCH RBC QN AUTO: 28.6 PG (ref 25.7–33.7)
MCHC RBC AUTO-ENTMCNC: 34.3 G/DL (ref 32–36)
MCV RBC: 83.5 FL (ref 80–96)
MONOCYTES # BLD AUTO: 8.4 % (ref 3.8–10.2)
NEUTROPHILS # BLD: 83.1 % (ref 42.8–82.8)
PLATELET # BLD AUTO: 312 K/MM3 (ref 134–434)
PLATELET BLD QL SMEAR: ADEQUATE
PMV BLD: 8.9 FL (ref 7.5–11.1)
POTASSIUM SERPLBLD-SCNC: 3.5 MMOL/L (ref 3.5–5.1)
PROT SERPL-MCNC: 6 G/DL (ref 6.4–8.2)
RBC # BLD AUTO: 3.94 M/MM3 (ref 3.6–5.2)
SODIUM SERPL-SCNC: 130 MMOL/L (ref 136–145)
WBC # BLD AUTO: 13.3 K/MM3 (ref 4–10)

## 2019-08-31 RX ADMIN — PIPERACILLIN SODIUM,TAZOBACTAM SODIUM SCH MLS/HR: 3; .375 INJECTION, POWDER, FOR SOLUTION INTRAVENOUS at 12:36

## 2019-08-31 RX ADMIN — ACETAMINOPHEN PRN MG: 650 SUPPOSITORY RECTAL at 23:15

## 2019-08-31 RX ADMIN — LEVETIRACETAM SCH: 500 TABLET, FILM COATED ORAL at 22:11

## 2019-08-31 RX ADMIN — Medication SCH: at 17:43

## 2019-08-31 RX ADMIN — POTASSIUM CHLORIDE SCH: 149 INJECTION, SOLUTION, CONCENTRATE INTRAVENOUS at 01:19

## 2019-08-31 RX ADMIN — LEVETIRACETAM SCH MG: 100 INJECTION, SOLUTION, CONCENTRATE INTRAVENOUS at 12:36

## 2019-08-31 RX ADMIN — POTASSIUM CHLORIDE SCH MLS/HR: 149 INJECTION, SOLUTION, CONCENTRATE INTRAVENOUS at 15:53

## 2019-08-31 RX ADMIN — PIPERACILLIN SODIUM,TAZOBACTAM SODIUM SCH MLS/HR: 3; .375 INJECTION, POWDER, FOR SOLUTION INTRAVENOUS at 02:51

## 2019-08-31 NOTE — PN
Progress Note, Physician


History of Present Illness: 





Pt seen and examined. Events noted. Pt having BM, NGT removed, advancing diet. 

She is alert, without distress. WBC elevated.





- Current Medication List


Current Medications: 


Active Medications





Acetaminophen (Tylenol Suppository -)  650 mg [place] Q6H PRN


   PRN Reason: FEVER


   Last Admin: 08/28/19 05:08 Dose:  650 mg


Al Hydroxide/Mg Hydroxide (Mylanta Oral Suspension -)  30 ml PO Q6H PRN


   PRN Reason: DYSPEPSIA


   Last Admin: 08/31/19 13:13 Dose:  30 ml


Piperacillin Sod/Tazobactam (Sod 3.375 gm/ Dextrose)  50 mls @ 100 mls/hr IVPB 

Q8H-IV [facility]; Protocol


   Stop: 08/31/19 18:01


   Last Admin: 08/31/19 12:36 Dose:  100 mls/hr


Potassium Chloride 20 meq/ (Amino Acids)  1,010 mls @ 60 mls/hr IVPB Q12H [facility]


   Last Admin: 08/31/19 01:19 Dose:  Not Given


Fat Emulsion Intravenous (Intralipid -)  250 mls @ 20.833 mls/hr IV DAILY@2200 

ROSE [facility]IE


   Last Admin: 08/30/19 21:38 Dose:  20.833 mls/hr


Levetiracetam (Keppra Injection -)  500 mg IVPB BID RO[facility]


   Last Admin: 08/31/19 12:36 Dose:  500 mg


Metoprolol Tartrate (Lopressor Injection -)  5 mg IVPUSH Q4H PRN


   PRN Reason: TACHYCARDIA


   Last Admin: 08/25/19 06:54 Dose:  5 mg


Morphine Sulfate (Morphine Sulfate)  0.5 mg IVPB Q4H PRN


   PRN Reason: PAIN LEVEL 6-10


   Last Admin: 08/28/19 23:47 Dose:  0.5 mg











- Objective


Vital Signs: 


 Vital Signs











Temperature  98.1 F   08/31/19 02:00


 


Pulse Rate  67   08/31/19 02:00


 


Respiratory Rate  18   08/31/19 02:00


 


Blood Pressure  105/42 L  08/31/19 02:00


 


O2 Sat by Pulse Oximetry (%)  99   08/30/19 21:00











Constitutional: Yes: No Distress


Cardiovascular: Yes: Regular Rate and Rhythm


Respiratory: Yes: Regular


Gastrointestinal: Yes: Soft


Genitourinary: Yes: WNL


Musculoskeletal: Yes: Other (contracted)


Integumentary: Yes: WNL


Neurological: Yes: Alert


Labs: 


 CBC, BMP





 08/31/19 10:58 





 08/31/19 09:35 





 INR, PTT











INR  1.00  (0.82-1.09)   08/22/19  17:22    








 Microbiology





08/25/19 11:23   Blood - Peripheral Venous   Blood Culture - Final


                            NO GROWTH AFTER 5 DAYS INCUBATION


08/24/19 20:00   Blood - Peripheral Venous   Blood Culture - Final


                            NO GROWTH AFTER 5 DAYS INCUBATION











- ....Imaging


Chest X-ray: Report Reviewed


X-ray: Report Reviewed





Problem List





- Problems


(1) Profound mental handicap


Code(s): F73 - PROFOUND INTELLECTUAL DISABILITIES   





(2) SBO (small bowel obstruction)


Code(s): K56.609 - UNSP INTESTNL OBST, UNSP AS TO PARTIAL VERSUS COMPLETE OBST 

  





(3) Aspiration into airway


Code(s): T17.908A - UNSP FB IN RESP TRACT, PART UNSP CAUSING OTH INJURY, INIT   





(4) Cerebral palsy


Code(s): G80.9 - CEREBRAL PALSY, UNSPECIFIED   





Assessment/Plan





SBO


Leukocytosis


Cerebral Palsy


Mental retardation


Seizure d.o.


Functional quadriplegia





-- wbc increasing, etiology unclear, remains afebrile/without distress


-- CXR without infiltrates


-- Had BM, surgery following - monitor closely 


-- repeat cbc and continue monitor wbc trend


-- monitor for signs of aspiration


-- continue Zosyn for now, will reassess after repeat cbc

## 2019-08-31 NOTE — PN
Progress Note (short form)





- Note


Progress Note: 





Attending Surgeon





Seen in f/u





VSS Af





abdo-soft; non tender; slightly tympanitic





NGT removed; had minimal drainage to bsb





IMP: resolving SBO





PLAN: Trial of clear liquids and advance as tolerated; if recurs or diet not 

tolerated will need to d/w familiy/HCP goals of care.





Wyatt Alfaro MD FACS

## 2019-08-31 NOTE — PN
Progress Note (short form)





- Note


Progress Note: 


S: nonverbal


  


  Current Medications











Generic Name Dose Route Start Last Admin





  Trade Name Freq  PRN Reason Stop Dose Admin


 


Acetaminophen  650 mg  08/24/19 14:13  08/28/19 05:08





  Tylenol Suppository -  [place]   650 mg





  Q6H PRN   Administration





  FEVER   





     





     





     


 


Piperacillin Sod/Tazobactam  50 mls @ 100 mls/hr  08/24/19 18:00  08/31/19 02:51





  Sod 3.375 gm/ Dextrose  IVPB   100 mls/hr





  Q8H-IV [facility]   Administration





     





     





  Protocol   





     


 


Potassium Chloride 20 meq/  1,010 mls @ 60 mls/hr  08/28/19 13:00  08/31/19 01:

19





  Amino Acids  IVPB   Not Given





  Q12H RO[facility]   





     





     





     





     


 


Fat Emulsion Intravenous  250 mls @ 20.833 mls/hr  08/29/19 22:00  08/30/19 21:

38





  Intralipid -  IV   20.833 mls/hr





  DAILY@2200 ROSE [facility]IE   Administration





     





     





     





     


 


Levetiracetam  500 mg  08/24/19 22:00  08/30/19 21:38





  Keppra Injection -  IVPB   500 mg





  BID ROSE MARIE   Administration





     





     





     





     


 


Metoprolol Tartrate  5 mg  08/24/19 15:49  08/25/19 06:54





  Lopressor Injection -  IVPUSH   5 mg





  Q4H PRN   Administration





  TACHYCARDIA   





     





     





     


 


Morphine Sulfate  0.5 mg  08/27/19 11:28  08/28/19 23:47





  Morphine Sulfate  IVPB   0.5 mg





  Q4H PRN   Administration





  PAIN LEVEL 6-10   





     





     





     








 Vital Signs











 Period  Temp  Pulse  Resp  BP Sys/Carlson  Pulse Ox


 


 Last 24 Hr  98.0 F-98.5 F    16-18  /42-82  99











Gen: NAD


Cardiovascular: Yes: Regular Rate and Rhythm


Respiratory: Yes: Rhonchi


Gastrointestinal: Yes: Soft


Edema: No


no jaundice diaphoresis


not agitated





- ....Imaging


EKG: Image Reviewed





  Laboratory Last Values











WBC  12.2 K/mm3 (4.0-10.0)  H  08/30/19  08:40    


 


RBC  3.71 M/mm3 (3.60-5.2)   08/30/19  08:40    


 


Hgb  10.6 GM/dL (10.7-15.3)  L  08/30/19  08:40    


 


Hct  31.3 % (32.4-45.2)  L  08/30/19  08:40    


 


MCV  84.4 fl (80-96)   08/30/19  08:40    


 


MCH  28.5 pg (25.7-33.7)   08/30/19  08:40    


 


MCHC  33.8 g/dl (32.0-36.0)   08/30/19  08:40    


 


RDW  14.1 % (11.6-15.6)   08/30/19  08:40    


 


Plt Count  244 K/MM3 (134-434)   08/30/19  08:40    


 


MPV  9.5 fl (7.5-11.1)   08/30/19  08:40    


 


Absolute Neuts (auto)  9.4 K/mm3 (1.5-8.0)  H  08/30/19  08:40    


 


Neutrophils %  77.1 % (42.8-82.8)   08/30/19  08:40    


 


Neutrophils % (Manual)  74.2 % (42.8-82.8)   08/30/19  08:40    


 


Band Neutrophils %  0.0 %  08/30/19  08:40    


 


Lymphocytes %  10.6 % (8-40)  D 08/30/19  08:40    


 


Lymphocytes % (Manual)  9.0 % (8-40)   08/30/19  08:40    


 


Monocytes %  11.9 % (3.8-10.2)  H  08/30/19  08:40    


 


Monocytes % (Manual)  14 % (3.8-10.2)  H  08/30/19  08:40    


 


Eosinophils %  0.0 % (0-4.5)   08/30/19  08:40    


 


Eosinophils % (Manual)  0.0 % (0-4.5)   08/30/19  08:40    


 


Basophils %  0.4 % (0-2.0)   08/30/19  08:40    


 


Basophils % (Manual)  0.0 % (0-2.0)   08/30/19  08:40    


 


Myelocytes % (Man)  2 % (0-2)  D 08/30/19  08:40    


 


Promyelocytes % (Man)  0 % (0-2)   08/30/19  08:40    


 


Blast Cells % (Manual)  0 % (0-0)   08/30/19  08:40    


 


Nucleated RBC %  0 % (0-0)   08/30/19  08:40    


 


Metamyelocytes  1 % (0-2)  D 08/30/19  08:40    


 


Hypochromia  0   08/30/19  08:40    


 


Platelet Estimate  Normal   08/30/19  08:40    


 


Polychromasia  0   08/30/19  08:40    


 


Poikilocytosis  0   08/30/19  08:40    


 


Anisocytosis  0   08/30/19  08:40    


 


Microcytosis  0   08/30/19  08:40    


 


Macrocytosis  0   08/30/19  08:40    


 


PT with INR  11.2 SEC (10.2-13.0)   08/22/19  17:22    


 


INR  1.00  (0.82-1.09)   08/22/19  17:22    


 


PTT (Actin FS)  32.9 SECONDS (25.2-36.5)   08/22/19  17:22    


 


Sodium  130 mmol/L (136-145)  L  08/31/19  09:35    


 


Potassium  3.5 mmol/L (3.5-5.1)   08/31/19  09:35    


 


Chloride  92 mmol/L ()  L  08/31/19  09:35    


 


Carbon Dioxide  30 mmol/L (21-32)   08/31/19  09:35    


 


Anion Gap  7 MMOL/L (8-16)  L  08/31/19  09:35    


 


BUN  19.0 mg/dL (7-18)  H  08/31/19  09:35    


 


Creatinine  0.3 mg/dL (0.55-1.3)  L  08/31/19  09:35    


 


Est GFR (CKD-EPI)AfAm  143.22   08/31/19  09:35    


 


Est GFR (CKD-EPI)NonAf  123.58   08/31/19  09:35    


 


Random Glucose  128 mg/dL ()  H  08/31/19  09:35    


 


Hemoglobin A1c %  5.4 % (4.2-6.3)   08/27/19  06:00    


 


Lactic Acid  1.9 mmol/L (0.4-2.0)   08/22/19  19:40    


 


Calcium  8.8 mg/dL (8.5-10.1)   08/31/19  09:35    


 


Magnesium  1.9 mg/dL (1.8-2.4)   08/31/19  09:35    


 


Total Bilirubin  0.7 mg/dL (0.2-1)   08/31/19  09:35    


 


AST  39 U/L (15-37)  H  08/31/19  09:35    


 


ALT  19 U/L (13-61)   08/31/19  09:35    


 


Alkaline Phosphatase  173 U/L ()  H  08/31/19  09:35    


 


Creatine Kinase  39 U/L ()   08/23/19  23:59    


 


Troponin I  < 0.02 ng/ml (0.00-0.05)   08/23/19  23:59    


 


Total Protein  6.0 g/dl (6.4-8.2)  L  08/31/19  09:35    


 


Albumin  2.3 g/dl (3.4-5.0)  L  08/31/19  09:35    


 


Blood Type  O POSITIVE   08/22/19  17:27    


 


Antibody Screen  Negative   08/22/19  17:27    














Assessment/Plan





DATA:








tele: sinus tachycardia





echo 8/2019  nl LV function, E/A reversal, RV not well visualized, mild MR





IMP:


Sinus tachycardia:


- sinus, monitoring on tele


- echo unremarkable


- likely in setting of underlying pain, anxiety, SBO


- trop neg, no ischemia on EKG - unlikely ACS





SBO:


- manage per surgery





Seizure disorder:


- manage per primary





GERD:


-holding meds, NPO

## 2019-08-31 NOTE — PN
Progress Note, Physician


Chief Complaint: 








patient non-verbal. Family at bedside. Lost IVF last night. IO placed at 

bedside and fluids infusing


History of Present Illness: 











History of Present Illness: 





Patient is a 59 year old female (resident of [facility]) with a PMHx 

significant for profound mental retardation, cerebral palsy, congenital 

quadriplegia, severe scoliosis, seizure disorder, and GERD.  Transferred from 

[place] to [facility] for further management of SBO, sepsis and tachycardia.  





- Current Medication List


Current Medications: 


Active Medications





Acetaminophen (Tylenol Suppository -)  650 mg [place] Q6H PRN


   PRN Reason: FEVER


   Last Admin: 08/28/19 05:08 Dose:  650 mg


Piperacillin Sod/Tazobactam (Sod 3.375 gm/ Dextrose)  50 mls @ 100 mls/hr IVPB 

Q8H-IV ROSE [facility]IE; Protocol


   Last Admin: 08/31/19 02:51 Dose:  100 mls/hr


Potassium Chloride 20 meq/ (Amino Acids)  1,010 mls @ 60 mls/hr IVPB Q12H ROSE MARIE


   Last Admin: 08/31/19 01:19 Dose:  Not Given


Fat Emulsion Intravenous (Intralipid -)  250 mls @ 20.833 mls/hr IV DAILY@2200 

ROSE MARIE


   Last Admin: 08/30/19 21:38 Dose:  20.833 mls/hr


Levetiracetam (Keppra Injection -)  500 mg IVPB BID ROSE MARIE


   Last Admin: 08/30/19 21:38 Dose:  500 mg


Metoprolol Tartrate (Lopressor Injection -)  5 mg IVPUSH Q4H PRN


   PRN Reason: TACHYCARDIA


   Last Admin: 08/25/19 06:54 Dose:  5 mg


Morphine Sulfate (Morphine Sulfate)  0.5 mg IVPB Q4H PRN


   PRN Reason: PAIN LEVEL 6-10


   Last Admin: 08/28/19 23:47 Dose:  0.5 mg











- Objective


Vital Signs: 


 Vital Signs











Temperature  98.1 F   08/31/19 02:00


 


Pulse Rate  67   08/31/19 02:00


 


Respiratory Rate  18   08/31/19 02:00


 


Blood Pressure  105/42 L  08/31/19 02:00


 


O2 Sat by Pulse Oximetry (%)  99   08/30/19 21:00











Additional Findings/Remarks: 





Constitutional: Yes: No Distress, Calm, Thin


Eyes: Yes: WNL, Conjunctiva Clear, EOM Intact


HENT: Yes: WNL, Atraumatic, Normocephalic


Neck: Yes: WNL, Supple, Trachea Midline


Cardiovascular: Yes: Regular Rate and Rhythm, Tachycardia


Respiratory: Yes: Regular, Diminished (at bases)


Gastrointestinal: Yes: Soft, Hypoactive Bowel Sounds, Other (NGT to gravity 

with bilious draiange)


...Rectal Exam: Yes: Deferred


Genitourinary: Yes: Incontinence


Breast(s): Yes: WNL


Musculoskeletal: Yes: Other (not able to assess)


Extremities: Yes: Deformity (severe cobtractures with scolosis), Other IO to 

left thigh


Edema: Yes


Edema: LLE: Trace, RLE: Trace


Peripheral Pulses WNL: Yes


Peripheral Pulses: Left Radial: 2+, Right Radial: 2+, Left Doralis Pedis: 2+, 

Right Dorsalis Pedis: 2+, Left Femoral: 2+, Right Femoral: 2+


Integumentary: Yes: WNL


Neurological: Yes: Alert (tracks voices)


Psychiatric: Yes: Alert


Labs: 


 CBC, BMP





 08/30/19 08:40 





 08/30/19 08:40 





 INR, PTT











INR  1.00  (0.82-1.09)   08/22/19  17:22    














- ....Imaging


Chest X-ray: Pending





Problem List





- Problems


(1) SBO (small bowel obstruction)


Assessment/Plan: 


passing stool


c/w serial abd exams/KUB


Surgery Dr Alfaro following


removed NGT and trial PO





Code(s): K56.609 - UNSP INTESTNL OBST, UNSP AS TO PARTIAL VERSUS COMPLETE OBST 

  





(2) Hypokalemia


Assessment/Plan: 


K 3.5, maintaining levels


continue to monitor K, maintain >4.0


c/w clinimix


Code(s): E87.6 - HYPOKALEMIA   





(3) Prophylactic measure


Assessment/Plan: 


FEN


NPO 


c/w Climinix 


monitor electrolytes





Dispo 


maintain on tele


full code


discharge planning











Code(s): Z29.9 - ENCOUNTER FOR PROPHYLACTIC MEASURES, UNSPECIFIED   





(4) Seizure


Assessment/Plan: 


on keppra.  


on tegretol in NH, continue keppra iv bid.  once no longer npo, can restart 

tegretol. 





Code(s): R56.9 - UNSPECIFIED CONVULSIONS   





(5) Sepsis


Assessment/Plan: 


wbc increased to 13 afebrile


monitor respiratory status.  


c/w 2 L NC


blood cultures NGTD


ID following, cont IV abx 


Rhonchi of upper lobes improving.  


CXR pending to r/o further aspiration pna


monitor output & temp


Code(s): A41.9 - SEPSIS, UNSPECIFIED ORGANISM   





(6) Aspiration into airway


Assessment/Plan: 


repeat CXR pending


c/w zosyn.


support with 2 liters of nasal cannula


monitor oxygen levels and titrate off oxygen as tolerated.  not home oxygen 

dependent at home. 


Code(s): T17.908A - UNSP FB IN RESP TRACT, PART UNSP CAUSING OTH INJURY, INIT   





(7) Cerebral palsy


Assessment/Plan: 


supportive care


BL UE severly contracted. IV placed to R AC. IV site is without erythema, not 

tender. IV placement attempted with ultrasound yesterday unsuccessfully and 

family refused to attempt EJ placement. Since IV site is without signs of 

infection and is flushing continue with current IV despite being in for > 72 

hours. Will continue to monitor site. 


Code(s): G80.9 - CEREBRAL PALSY, UNSPECIFIED   





(8) Left pulmonary infiltrate on CXR


Code(s): R91.8 - OTHER NONSPECIFIC ABNORMAL FINDING OF LUNG FIELD   





(9) Tachycardia


Assessment/Plan: 


HR remains in 120s despite increased frequency of MSO4


c/w MSO4 .5mg IV q8H





 


Code(s): R00.0 - TACHYCARDIA, UNSPECIFIED   





(10) Decreased oral intake


Assessment/Plan: 





At [place], patient is on a pured diet with honey thick liquids, medication 

taken with food.


She receives power pudding at breakfast, Weight gain supplement at breakfast 

and dinner, bran cereal three times a week, prune juice twice daily. She is 

supplemented with Ensure pudding twice a day and power pudding once daily. 2 

jose hn is given for meal refusal.


Code(s): R63.8 - OTHER SYMPTOMS AND SIGNS CONCERNING FOOD AND FLUID INTAKE   





(11) Hyponatremia


Assessment/Plan: 


Na decreased to 130, will adjust Clinimix accordingly





Code(s): E87.1 - HYPO-OSMOLALITY AND HYPONATREMIA   





Impression/Plan


Impression/Plan: 





Poor Venous Access


PIV  dislodged last night


mutiple attempt to replace unsuccessfully


IO placed to left thigh


will continue to use for no longer than 24H and then remove


started or PO and tolerating, will change IV abx to PO











Visit type





- Emergency Visit


Emergency Visit: Yes


ED Registration Date: 08/22/19


Care time: The patient presented to the Emergency Department on the above date 

and was hospitalized for further evaluation of their emergent condition.





- New Patient


This patient is new to me today: No





- Critical Care


Critical Care patient: No





- Discharge Referral


Referred to [facility] No

## 2019-08-31 NOTE — PN
Progress Note (short form)





- Note


Progress Note: 





Problems


1. hypernatremia


2. hyperkalemia - resolved


3. SBO


4. epilepsy


5. developemental delay


6. hypokalemia





Active Medications





Acetaminophen (Tylenol -)  650 mg PO Q6H PRN


   PRN Reason: Fever Or Pain


Amino Acids (Prosource No Carb Liquid Pkt)  30 ml PO BID@0800,1730 ROSE MARIE


   Last Admin: 08/31/19 17:43 Dose:  Not Given


Levetiracetam (Keppra -)  500 mg PO BID ROSE MARIE


Metoclopramide HCl (Reglan -)  10 mg PO TIDAC ROSE MARIE


Polyethylene Glycol (Miralax (For Daily Use) -)  17 gm PO DAILY ROSE MARIE


Simethicone (Mylicon -)  80 mg PO Q4H PRN


   PRN Reason: DYSPEPSIA


   Last Admin: 08/31/19 16:21 Dose:  80 mg





 Last Vital Signs











Temp Pulse Resp BP Pulse Ox


 


 97.9 F   111 H  20   111/71   97 


 


 08/31/19 14:00  08/31/19 14:00  08/31/19 14:00  08/31/19 14:00  08/31/19 10:00


























 CBC, BMP





 08/31/19 10:58 





 08/31/19 09:35 











Plan


- restart fluids once IV is established


- monitor lytes closely


- surgery follow up for sbo


- discussed with family

## 2019-09-01 LAB
ALBUMIN SERPL-MCNC: 2.7 G/DL (ref 3.4–5)
ALP SERPL-CCNC: 217 U/L (ref 45–117)
ALT SERPL-CCNC: 24 U/L (ref 13–61)
ANION GAP SERPL CALC-SCNC: 9 MMOL/L (ref 8–16)
ANISOCYTOSIS BLD QL: (no result)
AST SERPL-CCNC: 44 U/L (ref 15–37)
BASOPHILS # BLD: 0.4 % (ref 0–2)
BILIRUB SERPL-MCNC: 1 MG/DL (ref 0.2–1)
BILIRUB UR STRIP.AUTO-MCNC: (no result) MG/DL
BUN SERPL-MCNC: 25.5 MG/DL (ref 7–18)
CALCIUM SERPL-MCNC: 9.9 MG/DL (ref 8.5–10.1)
CHLORIDE SERPL-SCNC: 88 MMOL/L (ref 98–107)
CO2 SERPL-SCNC: 35 MMOL/L (ref 21–32)
CREAT SERPL-MCNC: 0.8 MG/DL (ref 0.55–1.3)
DEPRECATED RDW RBC AUTO: 14.1 % (ref 11.6–15.6)
EOSINOPHIL # BLD: 0 % (ref 0–4.5)
GLUCOSE SERPL-MCNC: 106 MG/DL (ref 74–106)
HCT VFR BLD CALC: 35.3 % (ref 32.4–45.2)
HGB BLD-MCNC: 12 GM/DL (ref 10.7–15.3)
KETONES UR QL STRIP: (no result)
LYMPHOCYTES # BLD: 3.3 % (ref 8–40)
MACROCYTES BLD QL: 0
MAGNESIUM SERPL-MCNC: 1.9 MG/DL (ref 1.8–2.4)
MCH RBC QN AUTO: 28 PG (ref 25.7–33.7)
MCHC RBC AUTO-ENTMCNC: 33.9 G/DL (ref 32–36)
MCV RBC: 82.5 FL (ref 80–96)
MONOCYTES # BLD AUTO: 2.3 % (ref 3.8–10.2)
NEUTROPHILS # BLD: 94 % (ref 42.8–82.8)
PH UR: 5 [PH] (ref 5–8)
PLATELET # BLD AUTO: 362 K/MM3 (ref 134–434)
PLATELET BLD QL SMEAR: NORMAL
PMV BLD: 8.9 FL (ref 7.5–11.1)
POTASSIUM SERPLBLD-SCNC: 3.3 MMOL/L (ref 3.5–5.1)
PROT SERPL-MCNC: 7 G/DL (ref 6.4–8.2)
PROT UR QL STRIP: (no result)
RBC # BLD AUTO: 4.27 M/MM3 (ref 3.6–5.2)
SODIUM SERPL-SCNC: 131 MMOL/L (ref 136–145)
SP GR UR: 1.01 (ref 1.01–1.03)
TARGETS BLD QL SMEAR: (no result)
UROBILINOGEN UR STRIP-MCNC: 0.2 MG/DL (ref 0.2–1)
WBC # BLD AUTO: 21.7 K/MM3 (ref 4–10)

## 2019-09-01 RX ADMIN — ACETAMINOPHEN PRN MG: 650 SUPPOSITORY RECTAL at 05:29

## 2019-09-01 RX ADMIN — PIPERACILLIN SODIUM,TAZOBACTAM SODIUM SCH MLS/HR: 3; .375 INJECTION, POWDER, FOR SOLUTION INTRAVENOUS at 20:16

## 2019-09-01 RX ADMIN — METOCLOPRAMIDE SCH: 10 TABLET ORAL at 06:32

## 2019-09-01 RX ADMIN — Medication SCH: at 08:04

## 2019-09-01 RX ADMIN — Medication SCH: at 18:25

## 2019-09-01 RX ADMIN — MORPHINE SULFATE PRN MG: 2 INJECTION, SOLUTION INTRAMUSCULAR; INTRAVENOUS at 23:12

## 2019-09-01 RX ADMIN — LEVETIRACETAM SCH MG: 500 TABLET, FILM COATED ORAL at 09:36

## 2019-09-01 RX ADMIN — I.V. FAT EMULSION SCH MLS/HR: 20 EMULSION INTRAVENOUS at 22:11

## 2019-09-01 RX ADMIN — METOCLOPRAMIDE SCH: 10 TABLET ORAL at 12:05

## 2019-09-01 RX ADMIN — LEVETIRACETAM SCH MG: 100 INJECTION, SOLUTION, CONCENTRATE INTRAVENOUS at 22:10

## 2019-09-01 RX ADMIN — METOCLOPRAMIDE SCH: 10 TABLET ORAL at 16:22

## 2019-09-01 RX ADMIN — POTASSIUM CHLORIDE SCH MLS/HR: 149 INJECTION, SOLUTION, CONCENTRATE INTRAVENOUS at 20:18

## 2019-09-01 NOTE — PN
Progress Note, Physician


Chief Complaint: 








patient non-verbal. Family at bedside. IO removed. WBC increased. Fever w/u in 

progress. Attempting to  obtain venous access


History of Present Illness: 











History of Present Illness: 





Patient is a 59 year old female (resident of [facility]) with a PMHx 

significant for profound mental retardation, cerebral palsy, congenital 

quadriplegia, severe scoliosis, seizure disorder, and GERD.  Transferred from 

[place] to [facility] for further management of SBO, sepsis and tachycardia.  





- Current Medication List


Current Medications: 


Active Medications





Acetaminophen (Tylenol Suppository -)  650 mg AK Q6H PRN


   PRN Reason: FEVER


   Last Admin: 09/01/19 05:29 Dose:  650 mg


Amino Acids (Prosource No Carb Liquid Pkt)  30 ml PO BID@0800,1730 [facility]


   Last Admin: 08/31/19 17:43 Dose:  Not Given


Levetiracetam (Keppra -)  500 mg PO BID [facility]


   Last Admin: 08/31/19 22:11 Dose:  Not Given


Metoclopramide HCl (Reglan -)  10 mg PO TIDAC [facility]


   Last Admin: 09/01/19 06:32 Dose:  Not Given


Polyethylene Glycol (Miralax (For Daily Use) -)  17 gm PO DAILY [facility]


Simethicone (Mylicon -)  80 mg PO Q4H PRN


   PRN Reason: DYSPEPSIA


   Last Admin: 08/31/19 16:21 Dose:  80 mg











- Objective


Vital Signs: 


 Vital Signs











Temperature  98.7 F   09/01/19 05:42


 


Pulse Rate  109 H  09/01/19 05:42


 


Respiratory Rate  20   09/01/19 05:42


 


Blood Pressure  135/85   09/01/19 05:42


 


O2 Sat by Pulse Oximetry (%)  95   08/31/19 21:00











Additional Findings/Remarks: 





Constitutional: Yes: No Distress, Calm, Thin


Eyes: Yes: WNL, Conjunctiva Clear, EOM Intact


HENT: Yes: WNL, Atraumatic, Normocephalic


Neck: Yes: WNL, Supple, Trachea Midline


Cardiovascular: Yes: Regular Rate and Rhythm, Tachycardia


Respiratory: Yes: Regular, Diminished (at bases)


Gastrointestinal: Yes: Soft, Hypoactive Bowel Sounds, not distended


...Rectal Exam: Yes: Deferred


Genitourinary: Yes: Incontinence


Breast(s): Yes: WNL


Musculoskeletal: Yes: Other (not able to assess)


Extremities: Yes: Deformity (severe cobtractures with scolosis), Other IO to 

left thigh


Edema: Yes


Edema: LLE: Trace, RLE: Trace


Peripheral Pulses WNL: Yes


Peripheral Pulses: Left Radial: 2+, Right Radial: 2+, Left Doralis Pedis: 2+, 

Right Dorsalis Pedis: 2+, Left Femoral: 2+, Right Femoral: 2+


Integumentary: Yes: WNL


Neurological: Yes: Alert (tracks voices)


Psychiatric: Yes: Alert


Labs: 


 CBC, BMP





 08/31/19 10:58 





 08/31/19 09:35 





 INR, PTT











INR  1.00  (0.82-1.09)   08/22/19  17:22    














Problem List





- Problems


(1) SBO (small bowel obstruction)


Assessment/Plan: 


continue to pass stool


Abd xray from today suggestive of persistent SBO


attempted to given PO yesterday, with questionable aspiration on first attempt (

coughing)


Surgery Dr Alfaro following





Code(s): K56.609 - UNSP INTESTNL OBST, UNSP AS TO PARTIAL VERSUS COMPLETE OBST 

  





(2) Hypokalemia


Assessment/Plan: 


K 3.5, maintaining levels


continue to monitor K, maintain >4.0


will restart clinimix once IV access in obtained


Code(s): E87.6 - HYPOKALEMIA   





(3) Prophylactic measure


Assessment/Plan: 


FEN


taking in small amounts of dysphagia diet


c/w Climinix 


monitor electrolytes





Dispo 


maintain on tele


full code


discharge planning











Code(s): Z29.9 - ENCOUNTER FOR PROPHYLACTIC MEASURES, UNSPECIFIED   





(4) Seizure


Assessment/Plan: 


on keppra PO now


on tegretol in [place], continue keppra iv bid.  once no longer npo, can restart 

tegretol. 





Code(s): R56.9 - UNSPECIFIED CONVULSIONS   





(5) Sepsis


Assessment/Plan: 


wbc increased to 21 afebrile


No effusions or infiltartes seen on CXR, continue to monitor respiratory 

status.  Will repeat CXR in am


c/w 2 L NC


blood cultures repaeted today, UCX pending


ID following, cont IV abx (flagyl & zozyn) when IV access is obtained. Until 

established with start flagyl Po & augmentin


Rhonchi of upper lobes improving.  


CXR without pna, will reeat in am


monitor output & temp


Code(s): A41.9 - SEPSIS, UNSPECIFIED ORGANISM   





(6) Aspiration into airway


Assessment/Plan: 


CXR in am


c/w augmentin & flagyl PO until IV access is established then resume IV zosyn & 

flagyl


support with 2 liters of nasal cannula


monitor oxygen levels and titrate off oxygen as tolerated.  not home oxygen 

dependent at home. 


Code(s): T17.908A - UNSP FB IN RESP TRACT, PART UNSP CAUSING OTH INJURY, INIT   





(7) Cerebral palsy


Assessment/Plan: 


supportive care


BL UE severly contracted. IV placed to R AC. IV site is without erythema, not 

tender. IV placement attempted with ultrasound yesterday unsuccessfully and 

family refused to attempt EJ placement. Since IV site is without signs of 

infection and is flushing continue with current IV despite being in for > 72 

hours. Will continue to monitor site. 


Code(s): G80.9 - CEREBRAL PALSY, UNSPECIFIED   





(8) Tachycardia


Assessment/Plan: 


HR remains in 120s despite increased frequency of MSO4


c/w MSO4 .5mg sq q8H





 


Code(s): R00.0 - TACHYCARDIA, UNSPECIFIED   





(9) Decreased oral intake


Assessment/Plan: 


c/w dysphagia diet


will have speeach patologist re-evaluate on tues


At [place], patient is on a pured diet with honey thick liquids, medication 

taken with food.


She receives power pudding at breakfast, Weight gain supplement at breakfast 

and dinner, bran cereal three times a week, prune juice twice daily. She is 

supplemented with Ensure pudding twice a day and power pudding once daily. 2 

jose hn is given for meal refusal.


Code(s): R63.8 - OTHER SYMPTOMS AND SIGNS CONCERNING FOOD AND FLUID INTAKE   





(10) Hyponatremia


Assessment/Plan: 


Na decreased to 131


 will restart Clinimix  wehn IV access is obtained





Code(s): E87.1 - HYPO-OSMOLALITY AND HYPONATREMIA   





Impression/Plan


Impression/Plan: 





Inability of establish IV Access


not able to establish peripherial IV access despite multiple attempt with 

several providers-myself, anesthesia, critical  care attending. Attempted to 

place IJ Central line and both RIJ/LIJ appear to be thrombosed on ultrasound. 

Not able to pass guide wire during  placement and decision was made to abort 

the procedure. No attempt made to place femerol catheter due to severe 

contractures. . With rising WBC decision made not to place another IO catheter. 

IVR on call. Dr Cm is coming in to attempt to obtain venous access. 

Discussed options and risk/benefits with family.  





Visit type





- Emergency Visit


Emergency Visit: Yes


ED Registration Date: 08/22/19


Care time: The patient presented to the Emergency Department on the above date 

and was hospitalized for further evaluation of their emergent condition.





- New Patient


This patient is new to me today: No





- Critical Care


Critical Care patient: No





- Discharge Referral


Referred to [facility] No

## 2019-09-01 NOTE — PN
Progress Note (short form)





- Note


Progress Note: 


S: nonverbal


  


 Current Medications











Generic Name Dose Route Start Last Admin





  Trade Name Freq  PRN Reason Stop Dose Admin


 


Acetaminophen  650 mg  08/31/19 23:07  09/01/19 05:29





  Tylenol Suppository -  ID   650 mg





  Q6H PRN   Administration





  FEVER   





     





     





     


 


Amino Acids  30 ml  08/31/19 17:30  08/31/19 17:43





  Prosource No Carb Liquid Pkt  PO   Not Given





  BID@0800,1730 [facility]   





     





     





     





     


 


Metronidazole  500 mg in 100 mls @ 100 mls/hr  09/01/19 10:45  





  Flagyl 500mg Premixed Ivpb -  IVPB   





  Q8H-IV [facility]   





     





     





     





     


 


Levetiracetam  500 mg  08/31/19 22:00  09/01/19 09:36





  Keppra -  PO   500 mg





  BID [facility]SE [facility]   Administration





     





     





     





     


 


Metoclopramide HCl  10 mg  09/01/19 07:00  09/01/19 06:32





  Reglan -  PO   Not Given





  TIDAC [facility]   





     





     





     





     


 


Morphine Sulfate  0.5 mg  09/01/19 08:45  09/01/19 09:59





  Morphine Sulfate  SQ   0.5 mg





  Q6H PRN   Administration





  PAIN LEVEL 6-10   





     





     





     


 


Ondansetron HCl  4 mg  09/01/19 08:46  





  Zofran Odt -  SL   





  Q8H PRN   





  NAUSEA AND/OR VOMITING   





     





     





     


 


Polyethylene Glycol  17 gm  09/01/19 10:00  09/01/19 09:36





  Miralax (For Daily Use) -  PO   17 gm





  DAILY [facility]SE [facility]IE   Administration





     





     





     





     


 


Simethicone  80 mg  08/31/19 15:22  08/31/19 16:21





  Mylicon -  PO   80 mg





  Q4H PRN   Administration





  DYSPEPSIA   





     





     





     








 Vital Signs











 Period  Temp  Pulse  Resp  BP Sys/Carlson  Pulse Ox


 


 Last 24 Hr  97.9 F-98.8 F  106-126  18-20  /50-85  95








Gen: NAD


Cardiovascular: Yes: Regular Rate and Rhythm


Respiratory: Yes: Rhonchi


Gastrointestinal: Yes: Soft


Edema: No


no jaundice diaphoresis


not agitated





- ....Imaging


EKG: Image Reviewed





  


 CBC, BMP





 09/01/19 07:40 





 09/01/19 07:40 








Assessment/Plan





DATA:








tele: sinus tachycardia





echo 8/2019  nl LV function, E/A reversal, RV not well visualized, mild MR





IMP:


Sinus tachycardia:


- sinus, monitoring on tele


- echo unremarkable


- likely in setting of underlying pain, anxiety, SBO


- trop neg, no ischemia on EKG - unlikely ACS





SBO:


- manage per surgery, ngt out, attempting po foods now





Seizure disorder:


- manage per primary

## 2019-09-01 NOTE — PN
Progress Note (short form)





- Note


Progress Note: 





Problems


1. hypernatremia


2. hyperkalemia - resolved


3. SBO


4. epilepsy


5. developemental delay


6. hypokalemia





 Current Medications





Acetaminophen (Ofirmev Injection -)  500 mg IVPB Q6H PRN


   PRN Reason: PAIN OR FEVER


Metronidazole (Flagyl 500mg Premixed Ivpb -)  500 mg in 100 mls @ 100 mls/hr 

IVPB Q8H [facility]


Piperacillin Sod/Tazobactam (Sod 3.375 gm/ Dextrose)  50 mls @ 100 mls/hr IVPB 

Q8H [facility]SE [facility]; Protocol


   Last Admin: 09/01/19 20:16 Dose:  100 mls/hr


Potassium Chloride 20 meq/ (Amino Acids)  1,010 mls @ 60 mls/hr IVPB Q12H [facility]


   Last Admin: 09/01/19 20:18 Dose:  60 mls/hr


Fat Emulsion Intravenous (Intralipid -)  250 mls @ 20.833 mls/hr IV DAILY@2200 

[facility]


Levetiracetam (Keppra Injection -)  500 mg IVPB BID [facility]


Metoclopramide HCl (Reglan Injection -)  10 mg IVPUSH Q6H PRN


   PRN Reason: NAUSEA AND/OR VOMITING


Morphine Sulfate (Morphine Sulfate)  0.5 mg IVPUSH Q6H PRN


   PRN Reason: PAIN LEVEL 6-10


Ondansetron HCl (Zofran Injection)  4 mg IVPUSH Q6H PRN


   PRN Reason: NAUSEA





 Last Vital Signs











Temp Pulse Resp BP Pulse Ox


 


 98.6 F   72   18   94/57 L  96 


 


 09/01/19 17:20  09/01/19 17:20  09/01/19 17:20  09/01/19 17:20  09/01/19 09:00

















intake is still hesitant


Lungs clear


Heart reg


Abd soft





 CBC, BMP





 09/01/19 07:40 





 09/01/19 07:40 








 








IMP- Prerenal azotemia


Hyponatremia


Hypokalemia


Poor po intake 








Plan


- restart fluids once IV is established


- monitor lytes closely


- surgery follow up for sbo


- discussed with family

## 2019-09-01 NOTE — PN
Progress Note, Physician


History of Present Illness: 





Events noted. Pt alert, without acute distress, remains afebrile. Episodes of 

vomiting noted since last night. Lost IV access last night, awaiting 

replacement. WBC with upward trend. 





- Current Medication List


Current Medications: 


Active Medications





Acetaminophen (Tylenol Suppository -)  650 mg [place] Q6H PRN


   PRN Reason: FEVER


   Last Admin: 09/01/19 05:29 Dose:  650 mg


Amino Acids (Prosource No Carb Liquid Pkt)  30 ml PO BID@0800,[facility]30 [facility]


   Last Admin: 08/31/19 17:43 Dose:  Not Given


Levetiracetam (Keppra -)  500 mg PO BID [facility]


   Last Admin: 09/01/19 09:36 Dose:  500 mg


Metoclopramide HCl (Reglan -)  10 mg PO TIDAC [facility]


   Last Admin: 09/01/19 06:32 Dose:  Not Given


Morphine Sulfate (Morphine Sulfate)  0.5 mg SQ Q6H PRN


   PRN Reason: PAIN LEVEL 6-10


   Last Admin: 09/01/19 09:59 Dose:  0.5 mg


Ondansetron HCl (Zofran Odt -)  4 mg SL Q8H PRN


   PRN Reason: NAUSEA AND/OR VOMITING


Polyethylene Glycol (Miralax (For Daily Use) -)  17 gm PO DAILY [facility]


   Last Admin: 09/01/19 09:36 Dose:  17 gm


Simethicone (Mylicon -)  80 mg PO Q4H PRN


   PRN Reason: DYSPEPSIA


   Last Admin: 08/31/19 16:21 Dose:  80 mg











- Objective


Vital Signs: 


 Vital Signs











Temperature  98.7 F   09/01/19 05:42


 


Pulse Rate  109 H  09/01/19 05:42


 


Respiratory Rate  20   09/01/19 05:42


 


Blood Pressure  135/85   09/01/19 05:42


 


O2 Sat by Pulse Oximetry (%)  95   08/31/19 21:00











Constitutional: Yes: No Distress, Calm


Eyes: Yes: Conjunctiva Clear


Cardiovascular: Yes: Regular Rate and Rhythm


Respiratory: Yes: Regular


Gastrointestinal: Yes: Soft, Hypoactive Bowel Sounds


Genitourinary: Yes: WNL


Edema: No


Integumentary: Yes: WNL


Neurological: Yes: Alert


Labs: 


 CBC, BMP





 09/01/19 07:40 





 09/01/19 07:40 





 INR, PTT











INR  1.00  (0.82-1.09)   08/22/19  17:22    








 Microbiology





08/25/19 11:23   Blood - Peripheral Venous   Blood Culture - Final


                            NO GROWTH AFTER 5 DAYS INCUBATION


08/24/19 20:00   Blood - Peripheral Venous   Blood Culture - Final


                            NO GROWTH AFTER 5 DAYS INCUBATION











- ....Imaging


Chest X-ray: Report Reviewed


X-ray: Pending





Problem List





- Problems


(1) Profound mental handicap


Code(s): F73 - PROFOUND INTELLECTUAL DISABILITIES   





(2) SBO (small bowel obstruction)


Code(s): K56.609 - UNSP INTESTNL OBST, UNSP AS TO PARTIAL VERSUS COMPLETE OBST 

  





(3) Aspiration into airway


Code(s): T17.908A - UNSP FB IN RESP TRACT, PART UNSP CAUSING OTH INJURY, INIT   





(4) Cerebral palsy


Code(s): G80.9 - CEREBRAL PALSY, UNSPECIFIED   





Assessment/Plan





SBO


Leukocytosis


Cerebral Palsy


Mental retardation


Seizure d.o.


Functional quadriplegia





-- wbc continues to increase, remains afebrile/vitals stable 


-- Lost IV fluid access last night, awaiting replacement


-- repeat Abd Xray ordered, repeat blood cultures


-- Surgical follow up


-- once IV access established, continue Zosyn, will add Flagyl empirically, 

monitor wbc


-- continue monitor wbc trend


-- aspiration precautions, monitor respiratory status

## 2019-09-02 LAB
ALBUMIN SERPL-MCNC: 2.2 G/DL (ref 3.4–5)
ALP SERPL-CCNC: 155 U/L (ref 45–117)
ALT SERPL-CCNC: 18 U/L (ref 13–61)
ANION GAP SERPL CALC-SCNC: 11 MMOL/L (ref 8–16)
AST SERPL-CCNC: 31 U/L (ref 15–37)
BILIRUB SERPL-MCNC: 0.8 MG/DL (ref 0.2–1)
BUN SERPL-MCNC: 46 MG/DL (ref 7–18)
CALCIUM SERPL-MCNC: 8.6 MG/DL (ref 8.5–10.1)
CHLORIDE SERPL-SCNC: 86 MMOL/L (ref 98–107)
CO2 SERPL-SCNC: 31 MMOL/L (ref 21–32)
CREAT SERPL-MCNC: 0.8 MG/DL (ref 0.55–1.3)
DEPRECATED RDW RBC AUTO: 15 % (ref 11.6–15.6)
GLUCOSE SERPL-MCNC: 166 MG/DL (ref 74–106)
HCT VFR BLD CALC: 33.3 % (ref 32.4–45.2)
HGB BLD-MCNC: 11.5 GM/DL (ref 10.7–15.3)
MCH RBC QN AUTO: 29.7 PG (ref 25.7–33.7)
MCHC RBC AUTO-ENTMCNC: 34.6 G/DL (ref 32–36)
MCV RBC: 86 FL (ref 80–96)
PLATELET # BLD AUTO: 344 K/MM3 (ref 134–434)
PMV BLD: 9.4 FL (ref 7.5–11.1)
POTASSIUM SERPLBLD-SCNC: 3.2 MMOL/L (ref 3.5–5.1)
PROT SERPL-MCNC: 6.3 G/DL (ref 6.4–8.2)
RBC # BLD AUTO: 3.87 M/MM3 (ref 3.6–5.2)
SODIUM SERPL-SCNC: 128 MMOL/L (ref 136–145)
WBC # BLD AUTO: 26.5 K/MM3 (ref 4–10)

## 2019-09-02 RX ADMIN — PIPERACILLIN SODIUM,TAZOBACTAM SODIUM SCH MLS/HR: 3; .375 INJECTION, POWDER, FOR SOLUTION INTRAVENOUS at 13:58

## 2019-09-02 RX ADMIN — IPRATROPIUM BROMIDE AND ALBUTEROL SULFATE PRN AMP: .5; 3 SOLUTION RESPIRATORY (INHALATION) at 14:55

## 2019-09-02 RX ADMIN — LEVETIRACETAM SCH MG: 100 INJECTION, SOLUTION, CONCENTRATE INTRAVENOUS at 22:54

## 2019-09-02 RX ADMIN — ONDANSETRON PRN MG: 2 INJECTION INTRAMUSCULAR; INTRAVENOUS at 22:59

## 2019-09-02 RX ADMIN — POTASSIUM CHLORIDE SCH MLS/HR: 7.46 INJECTION, SOLUTION INTRAVENOUS at 10:00

## 2019-09-02 RX ADMIN — LEVETIRACETAM SCH MG: 100 INJECTION, SOLUTION, CONCENTRATE INTRAVENOUS at 10:35

## 2019-09-02 RX ADMIN — MORPHINE SULFATE PRN MG: 2 INJECTION, SOLUTION INTRAMUSCULAR; INTRAVENOUS at 21:07

## 2019-09-02 RX ADMIN — I.V. FAT EMULSION SCH MLS/HR: 20 EMULSION INTRAVENOUS at 22:59

## 2019-09-02 RX ADMIN — I.V. FAT EMULSION SCH MLS/HR: 20 EMULSION INTRAVENOUS at 12:00

## 2019-09-02 RX ADMIN — PIPERACILLIN SODIUM,TAZOBACTAM SODIUM SCH MLS/HR: 3; .375 INJECTION, POWDER, FOR SOLUTION INTRAVENOUS at 20:00

## 2019-09-02 RX ADMIN — MORPHINE SULFATE PRN MG: 2 INJECTION, SOLUTION INTRAMUSCULAR; INTRAVENOUS at 11:00

## 2019-09-02 RX ADMIN — IPRATROPIUM BROMIDE AND ALBUTEROL SULFATE PRN AMP: .5; 3 SOLUTION RESPIRATORY (INHALATION) at 10:46

## 2019-09-02 RX ADMIN — MORPHINE SULFATE PRN MG: 2 INJECTION, SOLUTION INTRAMUSCULAR; INTRAVENOUS at 05:32

## 2019-09-02 RX ADMIN — IPRATROPIUM BROMIDE AND ALBUTEROL SULFATE PRN AMP: .5; 3 SOLUTION RESPIRATORY (INHALATION) at 21:53

## 2019-09-02 RX ADMIN — PIPERACILLIN SODIUM,TAZOBACTAM SODIUM SCH MLS/HR: 3; .375 INJECTION, POWDER, FOR SOLUTION INTRAVENOUS at 03:33

## 2019-09-02 RX ADMIN — POTASSIUM CHLORIDE SCH MLS/HR: 149 INJECTION, SOLUTION, CONCENTRATE INTRAVENOUS at 10:37

## 2019-09-02 RX ADMIN — MORPHINE SULFATE PRN MG: 2 INJECTION, SOLUTION INTRAMUSCULAR; INTRAVENOUS at 16:47

## 2019-09-02 RX ADMIN — METOCLOPRAMIDE PRN MG: 5 INJECTION, SOLUTION INTRAMUSCULAR; INTRAVENOUS at 10:28

## 2019-09-02 RX ADMIN — POTASSIUM CHLORIDE SCH MLS/HR: 7.46 INJECTION, SOLUTION INTRAVENOUS at 13:57

## 2019-09-02 NOTE — PN
Progress Note, Physician


Chief Complaint: 


pt with resp distress and vomiting this morning. OGT reinserted with immediate 

drainage of 1L bilious content. 


increasing WBC











- Current Medication List


Current Medications: 


Active Medications





Acetaminophen (Ofirmev Injection -)  500 mg IVPB Q6H PRN


   PRN Reason: PAIN OR FEVER


Albuterol/Ipratropium (Duoneb -)  1 amp NEB Q4H PRN


   PRN Reason: SHORTNESS OF BREATH


   Last Admin: 09/02/19 14:55 Dose:  1 amp


Metronidazole (Flagyl 500mg Premixed Ivpb -)  500 mg in 100 mls @ 100 mls/hr 

IVPB Q8H [facility]


   Last Admin: 09/02/19 13:58 Dose:  100 mls/hr


Piperacillin Sod/Tazobactam (Sod 3.375 gm/ Dextrose)  50 mls @ 100 mls/hr IVPB 

Q8H [facility]; Protocol


   Last Admin: 09/02/19 13:58 Dose:  100 mls/hr


Potassium Chloride 20 meq/ (Amino Acids)  1,010 mls @ 60 mls/hr IVPB Q12H [facility]


   Last Admin: 09/02/19 10:37 Dose:  60 mls/hr


Fat Emulsion Intravenous (Intralipid -)  250 mls @ 20.833 mls/hr IV DAILY@2200 

[facility]


   Last Admin: 09/01/19 22:11 Dose:  20.833 mls/hr


Levetiracetam (Keppra Injection -)  500 mg IVPB BID [facility]


   Last Admin: 09/02/19 10:35 Dose:  500 mg


Metoclopramide HCl (Reglan Injection -)  10 mg IVPUSH Q6H PRN


   PRN Reason: NAUSEA AND/OR VOMITING


   Last Admin: 09/02/19 10:28 Dose:  10 mg


Morphine Sulfate (Morphine Sulfate)  0.5 mg IVPUSH Q4H PRN


   PRN Reason: PAIN LEVEL 6-10


   Last Admin: 09/02/19 16:47 Dose:  0.5 mg


Ondansetron HCl (Zofran Injection)  4 mg IVPUSH Q6H PRN


   PRN Reason: NAUSEA


Vancomycin HCl (Vancomycin (Pre-Docked))  1,000 mg IVPB Q24H [facility]; Protocol











- Objective


Vital Signs: 


 Vital Signs











Temperature  99.9 F H  09/02/19 13:40


 


Pulse Rate  122 H  09/02/19 13:40


 


Respiratory Rate  20   09/02/19 13:40


 


Blood Pressure  91/61   09/02/19 13:40


 


O2 Sat by Pulse Oximetry (%)  95   09/02/19 09:00











Constitutional: Yes: Cachectic, Moderate Distress


Eyes: Yes: PERRL


HENT: Yes: Atraumatic, Normocephalic, Drooling


Cardiovascular: Yes: Tachycardia


Respiratory: Yes: Diminished, On Nasal O2, Other (coarse BS)


Gastrointestinal: Yes: Soft, Hypoactive Bowel Sounds


...Rectal Exam: Yes: Deferred


Musculoskeletal: Yes: Muscle Weakness, Other (extensive contractures)


Edema: No


Neurological: Yes: Weakness, Other (non-verbal)


Labs: 


 CBC, BMP





 09/02/19 08:45 





 09/02/19 05:45 





 INR, PTT











INR  1.00  (0.82-1.09)   08/22/19  17:22    














- ....Imaging


Chest X-ray: Report Reviewed (CXR 9/2/2019  Single AP view of the chest is been 

submitted. Imaging reveals a new NG tube with its tip in the stomach, scoliosis 

with convexity to the right, weak inspiration, prominent mediastinum with some 

atelectasis or infiltrate left base, left jugular line with tip in right atrium 

and abdominal distention again suggestive of an obstructive process. 

Correlation recommended. Reported By: Vincent Lindsay MD   09/02/19 [id number])


X-ray: Report Reviewed (Abd-XR 9/2/2019 Abdomen: Check NG tube placement. 

Respiratory distress  Single view of the chest and upper abdomen reveal 

abdominal distention compatible with a possible obstructive process, scoliosis, 

shift of mediastinal structures to the left with dense left base, NG tube with 

tip in stomach and right jugular line with tip in right atrium. There is no 

sign of a pneumothorax. Again noted is a dense retrocardiac area which could 

represent atelectasis or infiltrate with fluid. Reported By: Vincent Lindsay MD

  09/02/19 [id number])





Impression/Plan


Impression/Plan: 


(1) SBO (small bowel obstruction)


Assessment/Plan: 


recurrence of N/V


Abd xray from today suggestive of persistent SBO - case discussed with Surgery 

Dr Alfaro 


OGT to [facility]


increase morphine to 0.5mg q4hrs for comfort


IV Tylenol PRN 


Code(s): K56.609 - UNSP INTESTNL OBST, UNSP AS TO PARTIAL VERSUS COMPLETE OBST 

  





(2) Hypokalemia


Assessment/Plan: 


monitor K and replete as needed


continue to monitor K, maintain >4.0


will restart clinimix once IV access in obtained


Code(s): E87.6 - HYPOKALEMIA   





(3) Prophylactic measure


Assessment/Plan: 


FEN - NPO


c/w Climinix 


monitor electrolytes





maintain on tele


full code


requires inpt care   


Code(s): Z29.9 - ENCOUNTER FOR PROPHYLACTIC MEASURES, UNSPECIFIED   





(4) Seizure


continue keppra iv bid.  once no longer npo, can restart tegretol. 


Code(s): R56.9 - UNSPECIFIED CONVULSIONS   





(5) Sepsis


Assessment/Plan: 


wbc now 26


f/u urine and blood cultures 


ID following, cont IV abx (flagyl & zosyn), start vanco today


trend WBC and temp


Code(s): A41.9 - SEPSIS, UNSPECIFIED ORGANISM   





(6) Aspiration into airway


daily CXRs


start duonebs Q4hrs


chest PT as tolerated


IV zosyn & flagyl


add vanco 1G daily, check trough prior to 4th dose 


pt on 40% face mask -monitor oxygen levels and titrate off oxygen as tolerated.

  not home oxygen dependent at home. 


Code(s): T17.908A - UNSP FB IN RESP TRACT, PART UNSP CAUSING OTH INJURY, INIT   





(7) Cerebral palsy


supportive care


BL UE severely contracted. 


Turn and position Q2hrs


pall care consult to facilitate family meeting to discuss goals of care


Code(s): G80.9 - CEREBRAL PALSY, UNSPECIFIED   





(8) Tachycardia


continuous tele 


Code(s): R00.0 - TACHYCARDIA, UNSPECIFIED   





(9) Decreased oral intake


NPO


Fat emulsion infusion


Code(s): R63.8 - OTHER SYMPTOMS AND SIGNS CONCERNING FOOD AND FLUID INTAKE   





(10) Hyponatremia


trend sodium levels


pt appears to have hypovolemic hyponatremia - start NS with KCL at 125ml/hr


Code(s): E87.1 - HYPO-OSMOLALITY AND HYPONATREMI








Visit type





- Emergency Visit


Emergency Visit: Yes


ED Registration Date: 08/22/19


Care time: The patient presented to the Emergency Department on the above date 

and was hospitalized for further evaluation of their emergent condition.





- New Patient


This patient is new to me today: Yes


Date on this admission: 09/02/19





- Critical Care


Critical Care patient: No





- Discharge Referral


Referred to [facility] No

## 2019-09-02 NOTE — PN
Progress Note (short form)





- Note


Progress Note: 


S: nonverbal, has been vomiting again


  Current Medications











Generic Name Dose Route Start Last Admin





  Trade Name Freq  PRN Reason Stop Dose Admin


 


Acetaminophen  500 mg  09/01/19 18:53  





  Ofirmev Injection -  IVPB   





  Q6H PRN   





  PAIN OR FEVER   





     





     





     


 


Albuterol/Ipratropium  1 amp  09/02/19 10:32  09/02/19 10:46





  Duoneb -  NEB   1 amp





  Q4H PRN   Administration





  SHORTNESS OF BREATH   





     





     





     


 


Metronidazole  500 mg in 100 mls @ 100 mls/hr  09/01/19 19:30  09/02/19 04:11





  Flagyl 500mg Premixed Ivpb -  IVPB   100 mls/hr





  Q8H ROSE [facility]   Administration





     





     





     





     


 


Piperacillin Sod/Tazobactam  50 mls @ 100 mls/hr  09/01/19 19:30  09/02/19 03:33





  Sod 3.375 gm/ Dextrose  IVPB   100 mls/hr





  Q8H [facility]E   Administration





     





     





  Protocol   





     


 


Potassium Chloride 20 meq/  1,010 mls @ 60 mls/hr  09/01/19 20:00  09/02/19 10:

37





  Amino Acids  IVPB   60 mls/hr





  Q12H ROSE MARIE   Administration





     





     





     





     


 


Fat Emulsion Intravenous  250 mls @ 20.833 mls/hr  09/01/19 22:00  09/01/19 22:

11





  Intralipid -  IV   20.833 mls/hr





  DAILY@2200 ROSE MARIE   Administration





     





     





     





     


 


Potassium Chloride  10 meq in 100 mls @ 100 mls/hr  09/02/19 10:00  





  Potassium Chloride 10 Meq Premix Ivpb -  IVPB  09/02/19 12:59  





  Q60M ROSE MARIE   





     





     





     





     


 


Levetiracetam  500 mg  09/01/19 22:00  09/02/19 10:35





  Keppra Injection -  IVPB   500 mg





  BID ROSE MARIE   Administration





     





     





     





     


 


Metoclopramide HCl  10 mg  09/01/19 18:55  09/02/19 10:28





  Reglan Injection -  IVPUSH   10 mg





  Q6H PRN   Administration





  NAUSEA AND/OR VOMITING   





     





     





     


 


Morphine Sulfate  0.5 mg  09/01/19 18:56  09/02/19 05:32





  Morphine Sulfate  IVPUSH   0.5 mg





  Q6H PRN   Administration





  PAIN LEVEL 6-10   





     





     





     


 


Ondansetron HCl  4 mg  09/01/19 18:55  





  Zofran Injection  IVPUSH   





  Q6H PRN   





  NAUSEA   





     





     





     








 Vital Signs











 Period  Temp  Pulse  Resp  BP Sys/Carlson  Pulse Ox


 


 Last 24 Hr  97.9 F-98.6 F    18-20  /56-63  95-95











Gen: NAD


Cardiovascular: Yes: Regular Rate and Rhythm


Respiratory: Yes: Rhonchi


Gastrointestinal: Yes: Soft


Edema: No


no jaundice diaphoresis


not agitated





- ....Imaging


EKG: Image Reviewed





  CBC, BMP





 09/02/19 05:45 











Assessment/Plan











tele: sinus tachycardia





echo 8/2019  nl LV function, E/A reversal, RV not well visualized, mild MR





IMP:


Sinus tachycardia:


- sinus, monitoring on tele


- echo unremarkable


- likely in setting of underlying pain, anxiety, SBO


- trop neg, no ischemia on EKG - unlikely ACS





SBO:


- manage per surgery, ngt out, attempting po foods but vomiting again





Seizure disorder:


- manage per primary

## 2019-09-02 NOTE — PN
Progress Note (short form)





- Note


Progress Note: 





Problems


1. hypernatremia resolved


2. hyperkalemia - resolved


3. SBO


4. epilepsy


5. developemental delay


6. hypokalemia and hyponatremia 2/2 vomiting





 Current Medications





Acetaminophen (Ofirmev Injection -)  500 mg IVPB Q6H PRN


   PRN Reason: PAIN OR FEVER


Albuterol/Ipratropium (Duoneb -)  1 amp NEB Q4H PRN


   PRN Reason: SHORTNESS OF BREATH


   Last Admin: 09/02/19 10:46 Dose:  1 amp


Metronidazole (Flagyl 500mg Premixed Ivpb -)  500 mg in 100 mls @ 100 mls/hr 

IVPB Q8H [facility]


   Last Admin: 09/02/19 04:11 Dose:  100 mls/hr


Piperacillin Sod/Tazobactam (Sod 3.375 gm/ Dextrose)  50 mls @ 100 mls/hr IVPB 

Q8H [facility]; Protocol


   Last Admin: 09/02/19 03:33 Dose:  100 mls/hr


Potassium Chloride 20 meq/ (Amino Acids)  1,010 mls @ 60 mls/hr IVPB Q12H [facility]


   Last Admin: 09/02/19 10:37 Dose:  60 mls/hr


Fat Emulsion Intravenous (Intralipid -)  250 mls @ 20.833 mls/hr IV DAILY@2200 

[facility]


   Last Admin: 09/01/19 22:11 Dose:  20.833 mls/hr


Potassium Chloride (Potassium Chloride 10 Meq Premix Ivpb -)  10 meq in 100 mls 

@ 100 mls/hr IVPB Q60M [facility]


   Stop: 09/02/19 12:59


Levetiracetam (Keppra Injection -)  500 mg IVPB BID [facility]


   Last Admin: 09/02/19 10:35 Dose:  500 mg


Metoclopramide HCl (Reglan Injection -)  10 mg IVPUSH Q6H PRN


   PRN Reason: NAUSEA AND/OR VOMITING


   Last Admin: 09/02/19 10:28 Dose:  10 mg


Morphine Sulfate (Morphine Sulfate)  0.5 mg IVPUSH Q6H PRN


   PRN Reason: PAIN LEVEL 6-10


   Last Admin: 09/02/19 05:32 Dose:  0.5 mg


Ondansetron HCl (Zofran Injection)  4 mg IVPUSH Q6H PRN


   PRN Reason: NAUSEA





 Last Vital Signs











Temp Pulse Resp BP Pulse Ox


 


 98.1 F   121 H  18   108/60   95 


 


 09/02/19 06:00  09/02/19 06:00  09/02/19 09:00  09/02/19 06:00  09/02/19 09:00











 








intake is still hesitant


Lungs clear


Heart reg


Abd soft











 CBC, BMP





 09/02/19 08:45 





 09/02/19 05:45 








 CBC, BMP





 09/01/19 07:40 





 09/01/19 07:40 








 








IMP- Prerenal azotemia


Hyponatremia


Hypokalemia


Poor po intake 








Plan

## 2019-09-02 NOTE — PN
Progress Note, Physician


History of Present Illness: 





Pt with NGT re-inserted. No obvious distress, nonverbal. Family at bedside. +

low grade fever.





- Current Medication List


Current Medications: 


Active Medications





Acetaminophen (Ofirmev Injection -)  500 mg IVPB Q6H PRN


   PRN Reason: PAIN OR FEVER


Albuterol/Ipratropium (Duoneb -)  1 amp NEB Q4H PRN


   PRN Reason: SHORTNESS OF BREATH


   Last Admin: 09/02/19 14:55 Dose:  1 amp


Metronidazole (Flagyl 500mg Premixed Ivpb -)  500 mg in 100 mls @ 100 mls/hr 

IVPB Q8H [facility]


   Last Admin: 09/02/19 13:58 Dose:  100 mls/hr


Piperacillin Sod/Tazobactam (Sod 3.375 gm/ Dextrose)  50 mls @ 100 mls/hr IVPB 

Q8H [facility]; Protocol


   Last Admin: 09/02/19 13:58 Dose:  100 mls/hr


Potassium Chloride 20 meq/ (Amino Acids)  1,010 mls @ 60 mls/hr IVPB Q12H [facility]


   Last Admin: 09/02/19 10:37 Dose:  60 mls/hr


Fat Emulsion Intravenous (Intralipid -)  250 mls @ 20.833 mls/hr IV DAILY@2200 

[facility]


   Last Admin: 09/01/19 22:11 Dose:  20.833 mls/hr


Levetiracetam (Keppra Injection -)  500 mg IVPB BID [facility]


   Last Admin: 09/02/19 10:35 Dose:  500 mg


Metoclopramide HCl (Reglan Injection -)  10 mg IVPUSH Q6H PRN


   PRN Reason: NAUSEA AND/OR VOMITING


   Last Admin: 09/02/19 10:28 Dose:  10 mg


Morphine Sulfate (Morphine Sulfate)  0.5 mg IVPUSH Q4H PRN


   PRN Reason: PAIN LEVEL 6-10


   Last Admin: 09/02/19 16:47 Dose:  0.5 mg


Ondansetron HCl (Zofran Injection)  4 mg IVPUSH Q6H PRN


   PRN Reason: NAUSEA


Vancomycin HCl (Vancomycin (Pre-Docked))  1,000 mg IVPB DAILY [facility]; Protocol


   Stop: 09/03/19 14:29











- Objective


Vital Signs: 


 Vital Signs











Temperature  99.9 F H  09/02/19 13:40


 


Pulse Rate  122 H  09/02/19 13:40


 


Respiratory Rate  20   09/02/19 13:40


 


Blood Pressure  91/61   09/02/19 13:40


 


O2 Sat by Pulse Oximetry (%)  95   09/02/19 09:00











Constitutional: Yes: No Distress


Cardiovascular: Yes: Tachycardia


Respiratory: Yes: Rhonchi


Gastrointestinal: Yes: Soft, Other (+NGT with bilious drainage)


Musculoskeletal: Yes: Other (contracted)


Extremities: Yes: WNL


Integumentary: Yes: WNL


Labs: 


 CBC, BMP





 09/02/19 08:45 





 09/02/19 05:45 





 INR, PTT











INR  1.00  (0.82-1.09)   08/22/19  17:22    








 Microbiology





09/01/19 10:45   Blood - Peripheral Venous   Blood Culture - Preliminary


                            NO GROWTH OBTAINED AFTER 24 HOURS, INCUBATION TO 

CONTINUE


                            FOR 4 DAYS.


09/01/19 11:00   Blood - Peripheral Venous   Blood Culture - Preliminary


                            NO GROWTH OBTAINED AFTER 24 HOURS, INCUBATION TO 

CONTINUE


                            FOR 4 DAYS.


08/25/19 11:23   Blood - Peripheral Venous   Blood Culture - Final


                            NO GROWTH AFTER 5 DAYS INCUBATION


08/24/19 20:00   Blood - Peripheral Venous   Blood Culture - Final


                            NO GROWTH AFTER 5 DAYS INCUBATION











- ....Imaging


Chest X-ray: Report Reviewed


X-ray: Report Reviewed





Problem List





- Problems


(1) Profound mental handicap


Code(s): F73 - PROFOUND INTELLECTUAL DISABILITIES   





(2) SBO (small bowel obstruction)


Code(s): K56.609 - UNSP INTESTNL OBST, UNSP AS TO PARTIAL VERSUS COMPLETE OBST 

  





(3) Aspiration into airway


Code(s): T17.908A - UNSP FB IN RESP TRACT, PART UNSP CAUSING OTH INJURY, INIT   





(4) Cerebral palsy


Code(s): G80.9 - CEREBRAL PALSY, UNSPECIFIED   





Assessment/Plan





SBO


Leukocytosis/Fever


BENIGNO


Cerebral Palsy


Mental retardation


Seizure d.o.


Functional quadriplegia





-- NGT reinserted due to vomiting episodes, high risk for aspiration


-- wbc continues to increase, Tmax 99.9F


-- continue Zosyn/Flagyl, Vancomycin added (monitor renal function closely), 

order Vancomycin trough prior to 4th dose


-- repeat Abd Xray still showing obstruction


-- Surgical follow up


-- continue monitor wbc/vitals closely

## 2019-09-03 LAB
ALBUMIN SERPL-MCNC: 1.7 G/DL (ref 3.4–5)
ALBUMIN SERPL-MCNC: 1.8 G/DL (ref 3.4–5)
ALP SERPL-CCNC: 112 U/L (ref 45–117)
ALP SERPL-CCNC: 99 U/L (ref 45–117)
ALT SERPL-CCNC: 9 U/L (ref 13–61)
ALT SERPL-CCNC: 9 U/L (ref 13–61)
ANION GAP SERPL CALC-SCNC: 5 MMOL/L (ref 8–16)
ANION GAP SERPL CALC-SCNC: 7 MMOL/L (ref 8–16)
AST SERPL-CCNC: 14 U/L (ref 15–37)
AST SERPL-CCNC: 18 U/L (ref 15–37)
BILIRUB SERPL-MCNC: 0.4 MG/DL (ref 0.2–1)
BILIRUB SERPL-MCNC: 0.5 MG/DL (ref 0.2–1)
BUN SERPL-MCNC: 11.4 MG/DL (ref 7–18)
BUN SERPL-MCNC: 17.7 MG/DL (ref 7–18)
CALCIUM SERPL-MCNC: 7.7 MG/DL (ref 8.5–10.1)
CALCIUM SERPL-MCNC: 8.3 MG/DL (ref 8.5–10.1)
CHLORIDE SERPL-SCNC: 100 MMOL/L (ref 98–107)
CHLORIDE SERPL-SCNC: 94 MMOL/L (ref 98–107)
CO2 SERPL-SCNC: 28 MMOL/L (ref 21–32)
CO2 SERPL-SCNC: 30 MMOL/L (ref 21–32)
CREAT SERPL-MCNC: 0.2 MG/DL (ref 0.55–1.3)
CREAT SERPL-MCNC: < 0.2 MG/DL (ref 0.55–1.3)
DEPRECATED RDW RBC AUTO: 14.5 % (ref 11.6–15.6)
ERYTHROCYTE [SEDIMENTATION RATE] IN BLOOD BY WESTERGREN METHOD: 73 MM/HR (ref 0–30)
GLUCOSE SERPL-MCNC: 120 MG/DL (ref 74–106)
GLUCOSE SERPL-MCNC: 179 MG/DL (ref 74–106)
HCT VFR BLD CALC: 27.3 % (ref 32.4–45.2)
HGB BLD-MCNC: 9 GM/DL (ref 10.7–15.3)
MAGNESIUM SERPL-MCNC: 1.6 MG/DL (ref 1.8–2.4)
MCH RBC QN AUTO: 28.2 PG (ref 25.7–33.7)
MCHC RBC AUTO-ENTMCNC: 33.1 G/DL (ref 32–36)
MCV RBC: 85.2 FL (ref 80–96)
PHOSPHATE SERPL-MCNC: 0.3 MG/DL (ref 2.5–4.9)
PHOSPHATE SERPL-MCNC: 1.5 MG/DL (ref 2.5–4.9)
PLATELET # BLD AUTO: 223 K/MM3 (ref 134–434)
PMV BLD: 8.9 FL (ref 7.5–11.1)
POTASSIUM SERPLBLD-SCNC: 3.6 MMOL/L (ref 3.5–5.1)
POTASSIUM SERPLBLD-SCNC: 4 MMOL/L (ref 3.5–5.1)
PROT SERPL-MCNC: 4.6 G/DL (ref 6.4–8.2)
PROT SERPL-MCNC: 5.2 G/DL (ref 6.4–8.2)
RBC # BLD AUTO: 3.2 M/MM3 (ref 3.6–5.2)
SODIUM SERPL-SCNC: 131 MMOL/L (ref 136–145)
SODIUM SERPL-SCNC: 133 MMOL/L (ref 136–145)
WBC # BLD AUTO: 22.6 K/MM3 (ref 4–10)

## 2019-09-03 RX ADMIN — ONDANSETRON PRN MG: 2 INJECTION INTRAMUSCULAR; INTRAVENOUS at 13:00

## 2019-09-03 RX ADMIN — MORPHINE SULFATE PRN MG: 2 INJECTION, SOLUTION INTRAMUSCULAR; INTRAVENOUS at 08:08

## 2019-09-03 RX ADMIN — METOCLOPRAMIDE PRN MG: 5 INJECTION, SOLUTION INTRAMUSCULAR; INTRAVENOUS at 20:47

## 2019-09-03 RX ADMIN — MORPHINE SULFATE PRN MG: 2 INJECTION, SOLUTION INTRAMUSCULAR; INTRAVENOUS at 12:32

## 2019-09-03 RX ADMIN — ONDANSETRON PRN MG: 2 INJECTION INTRAMUSCULAR; INTRAVENOUS at 06:14

## 2019-09-03 RX ADMIN — IPRATROPIUM BROMIDE AND ALBUTEROL SULFATE PRN AMP: .5; 3 SOLUTION RESPIRATORY (INHALATION) at 21:05

## 2019-09-03 RX ADMIN — IPRATROPIUM BROMIDE AND ALBUTEROL SULFATE PRN AMP: .5; 3 SOLUTION RESPIRATORY (INHALATION) at 07:58

## 2019-09-03 RX ADMIN — ONDANSETRON PRN MG: 2 INJECTION INTRAMUSCULAR; INTRAVENOUS at 23:36

## 2019-09-03 RX ADMIN — IPRATROPIUM BROMIDE AND ALBUTEROL SULFATE PRN AMP: .5; 3 SOLUTION RESPIRATORY (INHALATION) at 04:26

## 2019-09-03 RX ADMIN — PIPERACILLIN SODIUM,TAZOBACTAM SODIUM SCH MLS/HR: 3; .375 INJECTION, POWDER, FOR SOLUTION INTRAVENOUS at 02:54

## 2019-09-03 RX ADMIN — LEVETIRACETAM SCH MG: 100 INJECTION, SOLUTION, CONCENTRATE INTRAVENOUS at 21:56

## 2019-09-03 RX ADMIN — PIPERACILLIN SODIUM,TAZOBACTAM SODIUM SCH MLS/HR: 3; .375 INJECTION, POWDER, FOR SOLUTION INTRAVENOUS at 20:47

## 2019-09-03 RX ADMIN — POTASSIUM CHLORIDE SCH MLS/HR: 149 INJECTION, SOLUTION, CONCENTRATE INTRAVENOUS at 01:03

## 2019-09-03 RX ADMIN — POTASSIUM CHLORIDE SCH MLS/HR: 149 INJECTION, SOLUTION, CONCENTRATE INTRAVENOUS at 23:36

## 2019-09-03 RX ADMIN — METOCLOPRAMIDE PRN MG: 5 INJECTION, SOLUTION INTRAMUSCULAR; INTRAVENOUS at 01:03

## 2019-09-03 RX ADMIN — MORPHINE SULFATE PRN MG: 2 INJECTION, SOLUTION INTRAMUSCULAR; INTRAVENOUS at 19:33

## 2019-09-03 RX ADMIN — VANCOMYCIN HYDROCHLORIDE SCH MG: 1 INJECTION, POWDER, LYOPHILIZED, FOR SOLUTION INTRAVENOUS at 17:59

## 2019-09-03 RX ADMIN — PIPERACILLIN SODIUM,TAZOBACTAM SODIUM SCH MLS/HR: 3; .375 INJECTION, POWDER, FOR SOLUTION INTRAVENOUS at 12:34

## 2019-09-03 RX ADMIN — LEVETIRACETAM SCH MG: 100 INJECTION, SOLUTION, CONCENTRATE INTRAVENOUS at 10:25

## 2019-09-03 RX ADMIN — MORPHINE SULFATE PRN MG: 2 INJECTION, SOLUTION INTRAMUSCULAR; INTRAVENOUS at 23:35

## 2019-09-03 RX ADMIN — POTASSIUM CHLORIDE AND SODIUM CHLORIDE SCH: 900; 150 INJECTION, SOLUTION INTRAVENOUS at 00:57

## 2019-09-03 RX ADMIN — POTASSIUM CHLORIDE SCH MLS/HR: 149 INJECTION, SOLUTION, CONCENTRATE INTRAVENOUS at 08:15

## 2019-09-03 NOTE — PN
Progress Note (short form)





- Note


Progress Note: 








S: nonverbal, appears comfortable.


 


 Current Medications





Acetaminophen (Ofirmev Injection -)  500 mg IVPB Q6H PRN


   PRN Reason: PAIN OR FEVER


Albuterol/Ipratropium (Duoneb -)  1 amp NEB Q4H PRN


   PRN Reason: SHORTNESS OF BREATH


   Last Admin: 09/03/19 07:58 Dose:  1 amp


Metronidazole (Flagyl 500mg Premixed Ivpb -)  500 mg in 100 mls @ 100 mls/hr 

IVPB Q8H [facility]


   Last Admin: 09/03/19 02:55 Dose:  100 mls/hr


Piperacillin Sod/Tazobactam (Sod 3.375 gm/ Dextrose)  50 mls @ 100 mls/hr IVPB 

Q8H [facility]; Protocol


   Last Admin: 09/03/19 02:54 Dose:  100 mls/hr


Potassium Chloride 20 meq/ (Amino Acids)  1,010 mls @ 60 mls/hr IVPB Q12H [facility]


   Last Admin: 09/03/19 08:15 Dose:  60 mls/hr


Fat Emulsion Intravenous (Intralipid -)  250 mls @ 20.833 mls/hr IV DAILY@2200 

[facility]


   Last Admin: 09/02/19 22:59 Dose:  20.833 mls/hr


Potassium Chloride/Sodium Chloride (Ns+20 Meq Kcl -)  20 meq in 1,000 mls @ 60 

mls/hr IV ASDIR [facility]


   Last Admin: 09/03/19 00:57 Dose:  Not Given


Potassium Phosphate 15 mm/ (Dextrose)  255 mls @ 62.5 mls/hr IVPB ONCE ONE


   Stop: 09/03/19 14:04


Levetiracetam (Keppra Injection -)  500 mg IVPB BID [facility]


   Last Admin: 09/03/19 10:25 Dose:  500 mg


Metoclopramide HCl (Reglan Injection -)  10 mg IVPUSH Q6H PRN


   PRN Reason: NAUSEA AND/OR VOMITING


   Last Admin: 09/03/19 01:03 Dose:  10 mg


Morphine Sulfate (Morphine Sulfate)  0.5 mg IVPUSH Q4H PRN


   PRN Reason: PAIN LEVEL 6-10


   Last Admin: 09/03/19 08:08 Dose:  0.5 mg


Ondansetron HCl (Zofran Injection)  4 mg IVPUSH Q6H PRN


   PRN Reason: NAUSEA


   Last Admin: 09/03/19 06:14 Dose:  4 mg


Vancomycin HCl (Vancomycin (Pre-Docked))  1,000 mg IVPB Q24H [facility]; Protocol


 Vital Signs











 Period  Temp  Pulse  Resp  BP Sys/Carlson  Pulse Ox


 


 Last 24 Hr  96.8 F-100.0 F  109-125  20-20  /  100-100











Gen: NAD


Cardiovascular: Yes: Regular Rate and Rhythm


Respiratory: Yes: Rhonchi


Gastrointestinal: Yes: Soft


Edema: No


no jaundice diaphoresis


not agitated








Assessment/Plan











tele: sinus tachycardia





echo 8/2019  nl LV function, E/A reversal, RV not well visualized, mild MR





IMP:


Sinus tachycardia:


- sinus, monitoring on tele


- echo unremarkable


- likely in setting of underlying pain, anxiety, SBO - improving


- trop neg, no ischemia on EKG - unlikely ACS





SBO:


- manage per surgery, ngt in again





Seizure disorder:


- manage per primary

## 2019-09-03 NOTE — PN
Progress Note, Physician


History of Present Illness: 





events noted


ng back


no bowel sounds





- Current Medication List


Current Medications: 


Active Medications





Acetaminophen (Ofirmev Injection -)  500 mg IVPB Q6H PRN


   PRN Reason: PAIN OR FEVER


Albuterol/Ipratropium (Duoneb -)  1 amp NEB Q4H PRN


   PRN Reason: SHORTNESS OF BREATH


   Last Admin: 09/03/19 07:58 Dose:  1 amp


Metronidazole (Flagyl 500mg Premixed Ivpb -)  500 mg in 100 mls @ 100 mls/hr 

IVPB Q8H [facility]


   Last Admin: 09/03/19 02:55 Dose:  100 mls/hr


Piperacillin Sod/Tazobactam (Sod 3.375 gm/ Dextrose)  50 mls @ 100 mls/hr IVPB 

Q8H [facility]; Protocol


   Last Admin: 09/03/19 02:54 Dose:  100 mls/hr


Potassium Chloride 20 meq/ (Amino Acids)  1,010 mls @ 60 mls/hr IVPB Q12H [facility]


   Last Admin: 09/03/19 08:15 Dose:  60 mls/hr


Fat Emulsion Intravenous (Intralipid -)  250 mls @ 20.833 mls/hr IV DAILY@2200 

[facility]


   Last Admin: 09/02/19 22:59 Dose:  20.833 mls/hr


Potassium Chloride/Sodium Chloride (Ns+20 Meq Kcl -)  20 meq in 1,000 mls @ 60 

mls/hr IV ASDIR [facility]


   Last Admin: 09/03/19 00:57 Dose:  Not Given


Potassium Phosphate 15 mm/ (Dextrose)  255 mls @ 62.5 mls/hr IVPB ONCE ONE


   Stop: 09/03/19 14:04


Levetiracetam (Keppra Injection -)  500 mg IVPB BID [facility]


   Last Admin: 09/03/19 10:25 Dose:  500 mg


Metoclopramide HCl (Reglan Injection -)  10 mg IVPUSH Q6H PRN


   PRN Reason: NAUSEA AND/OR VOMITING


   Last Admin: 09/03/19 01:03 Dose:  10 mg


Morphine Sulfate (Morphine Sulfate)  0.5 mg IVPUSH Q4H PRN


   PRN Reason: PAIN LEVEL 6-10


   Last Admin: 09/03/19 08:08 Dose:  0.5 mg


Ondansetron HCl (Zofran Injection)  4 mg IVPUSH Q6H PRN


   PRN Reason: NAUSEA


   Last Admin: 09/03/19 06:14 Dose:  4 mg


Vancomycin HCl (Vancomycin (Pre-Docked))  1,000 mg IVPB Q24H [facility]; Protocol











- Objective


Vital Signs: 


 Vital Signs











Temperature  96.8 F L  09/03/19 06:53


 


Pulse Rate  111 H  09/03/19 06:53


 


Respiratory Rate  20   09/03/19 06:53


 


Blood Pressure  117/84   09/03/19 06:53


 


O2 Sat by Pulse Oximetry (%)  100   09/03/19 01:00











Constitutional: Yes: Calm, Mild Distress


Cardiovascular: Yes: S1, S2


Respiratory: Yes: Regular, Poor Air Entry (bases)


Gastrointestinal: Yes: Soft, Other (absent bowel sounds,ng in place)


Musculoskeletal: Yes: WNL


Extremities: Yes: Other


Neurological: Yes: Alert, Other


Labs: 


 CBC, BMP





 09/03/19 06:07 





 09/03/19 06:07 





 INR, PTT











INR  1.00  (0.82-1.09)   08/22/19  17:22    














Assessment/Plan





59 year-old female resident of [facility], with a PMH significant for 

profound mental retardation, cerebral palsy, congenital quadriplegia, severe 

scoliosis, seizure disorder, and GERD. Admitted for SBO.





SBO


Profound mental retardation


Cerebral palsy


Congenital quadriplegia


Severe scoliosis


Seizure disorder


GERD





plan


continue abx


zosyn


stop vanco


nutrition


ng suctioning


rest as per the team

## 2019-09-03 NOTE — PN
Progress Note (short form)





- Note


Progress Note: 





Attending Surgeon HD #[id number]





Seen in f/u; remains clnically and radiographically obstructed





VSS AF





abdo-soft; ? distended ? and ? tympanitic ?; no tenderness  





rectal-no mass no stool





blood cultures negative


urine culture negative


WBC remains elevated





IMP: persstent SBO





PLAN: Long d/w family this AM re dx./progonosis and goals of care; new NGT 

inserted and connected to [facility]; will continue present tx.; advise pallaitive 

care Consultation if not already ordered to address goals of care as patients 

family does not want operative intervention.





Wyatt Alfaro MD FACS

## 2019-09-03 NOTE — PN
Progress Note, Physician


Chief Complaint: 





events of last days reviewed.  patient continues to have ngt and did not 

tolerate removal.  spoke with family and they are in agreement with a 

palliative care consult. 





History of Present Illness: 





Patient is a 59 year old female (resident of [facility]) with a PMHx 

significant for profound mental retardation, cerebral palsy, congenital 

quadriplegia, severe scoliosis, seizure disorder, and GERD.  Transferred from 

[place] to [facility] for further management of SBO, sepsis and tachycardia.  








critically low phos level reported, potassium phos ordered.  repeat labs. 





- Current Medication List


Current Medications: 


Active Medications





Acetaminophen (Ofirmev Injection -)  500 mg IVPB Q6H PRN


   PRN Reason: PAIN OR FEVER


Albuterol/Ipratropium (Duoneb -)  1 amp NEB Q4H PRN


   PRN Reason: SHORTNESS OF BREATH


   Last Admin: 09/03/19 07:58 Dose:  1 amp


Metronidazole (Flagyl 500mg Premixed Ivpb -)  500 mg in 100 mls @ 100 mls/hr 

IVPB Q8H [facility]


   Last Admin: 09/03/19 02:55 Dose:  100 mls/hr


Piperacillin Sod/Tazobactam (Sod 3.375 gm/ Dextrose)  50 mls @ 100 mls/hr IVPB 

Q8H [facility]; Protocol


   Last Admin: 09/03/19 02:54 Dose:  100 mls/hr


Potassium Chloride 20 meq/ (Amino Acids)  1,010 mls @ 60 mls/hr IVPB Q12H [facility]


   Last Admin: 09/03/19 08:15 Dose:  60 mls/hr


Fat Emulsion Intravenous (Intralipid -)  250 mls @ 20.833 mls/hr IV DAILY@2200 

[facility]


   Last Admin: 09/02/19 22:59 Dose:  20.833 mls/hr


Potassium Chloride/Sodium Chloride (Ns+20 Meq Kcl -)  20 meq in 1,000 mls @ 60 

mls/hr IV ASDIR [facility]


   Last Admin: 09/03/19 00:57 Dose:  Not Given


Potassium Phosphate 15 mm/ (Dextrose)  255 mls @ 62.5 mls/hr IVPB ONCE ONE


   Stop: 09/03/19 14:04


Levetiracetam (Keppra Injection -)  500 mg IVPB BID [facility]


   Last Admin: 09/03/19 10:25 Dose:  500 mg


Metoclopramide HCl (Reglan Injection -)  10 mg IVPUSH Q6H PRN


   PRN Reason: NAUSEA AND/OR VOMITING


   Last Admin: 09/03/19 01:03 Dose:  10 mg


Morphine Sulfate (Morphine Sulfate)  0.5 mg IVPUSH Q4H PRN


   PRN Reason: PAIN LEVEL 6-10


   Last Admin: 09/03/19 08:08 Dose:  0.5 mg


Ondansetron HCl (Zofran Injection)  4 mg IVPUSH Q6H PRN


   PRN Reason: NAUSEA


   Last Admin: 09/03/19 06:14 Dose:  4 mg


Vancomycin HCl (Vancomycin (Pre-Docked))  1,000 mg IVPB Q24H [facility]; Protocol











- Objective


Vital Signs: 


 Vital Signs











Temperature  96.8 F L  09/03/19 06:53


 


Pulse Rate  111 H  09/03/19 06:53


 


Respiratory Rate  20   09/03/19 06:53


 


Blood Pressure  117/84   09/03/19 06:53


 


O2 Sat by Pulse Oximetry (%)  100   09/03/19 01:00











Constitutional: Yes: Calm


Eyes: Yes: WNL


HENT: Yes: Atraumatic


Neck: Yes: Supple


Cardiovascular: Yes: Regular Rate and Rhythm


Respiratory: Yes: Diminished


Gastrointestinal: Yes: Soft


...Rectal Exam: Yes: Deferred


Labs: 


 CBC, BMP





 09/03/19 06:07 





 09/03/19 06:07 





 INR, PTT











INR  1.00  (0.82-1.09)   08/22/19  17:22    














Problem List





- Problems


(1) Sepsis


Assessment/Plan: 


on zosyn, vanco 


id following


Code(s): A41.9 - SEPSIS, UNSPECIFIED ORGANISM   





(2) SBO (small bowel obstruction)


Assessment/Plan: 





NGT to low intermittent suction


abd xray with sbo, persists.  surgery notes reviewed


abdomen distended, absent bowel sounds


monitor drain output


surgery following, notes appreciated


Code(s): K56.609 - UNSP INTESTNL OBST, UNSP AS TO PARTIAL VERSUS COMPLETE OBST 

  





(3) Aspiration into airway


Assessment/Plan: 


chest xray shows left lower lobe infiltrate.  on zosyn.


support with 2 liters of nasal cannula


monitor oxygen levels and titrate off oxygen as tolerated.  not home oxygen 

dependent at home. 


Code(s): T17.908A - UNSP FB IN RESP TRACT, PART UNSP CAUSING OTH INJURY, INIT   





(4) Decreased oral intake


Assessment/Plan: 


NPO.  on clinimax, lipids   


Code(s): R63.8 - OTHER SYMPTOMS AND SIGNS CONCERNING FOOD AND FLUID INTAKE   





(5) Seizure


Assessment/Plan: 


on keppra.  


on tegratol in NH, continue keppra iv bid.  once no longer npo, can restart 

tegretol. 





Code(s): R56.9 - UNSPECIFIED CONVULSIONS   





(6) Hypernatremia


Assessment/Plan: 


renal consulted for electrolyte imbalance.  recommendations appreciated. 


Code(s): E87.0 - HYPEROSMOLALITY AND HYPERNATREMIA   





(7) Hyperkalemia


Assessment/Plan: 


resolved.  monitor K daily


Code(s): E87.5 - HYPERKALEMIA   





(8) Profound mental handicap


Assessment/Plan: 


supportive care.





Code(s): F73 - PROFOUND INTELLECTUAL DISABILITIES   





(9) Tachycardia


Assessment/Plan: 


tachycardia likely in the setting of abdominal pain, anxiety, NGT- improving. 


 


Code(s): R00.0 - TACHYCARDIA, UNSPECIFIED   





(10) Left pulmonary infiltrate on CXR


Assessment/Plan: 


apply oxygen 2 liters.  patient noted to be congested and breathing rate 24s


on zosyn


 


Code(s): R91.8 - OTHER NONSPECIFIC ABNORMAL FINDING OF LUNG FIELD   





(11) Prophylactic measure


Assessment/Plan: 


fen


npo


on clinimax


monitor electrolytes





full code





Code(s): Z29.9 - ENCOUNTER FOR PROPHYLACTIC MEASURES, UNSPECIFIED   





Visit type





- Emergency Visit


Emergency Visit: Yes


ED Registration Date: 08/22/19


Care time: The patient presented to the Emergency Department on the above date 

and was hospitalized for further evaluation of their emergent condition.





- New Patient


This patient is new to me today: No





- Critical Care


Critical Care patient: No





- Discharge Referral


Referred to [facility] No

## 2019-09-03 NOTE — PN
Progress Note, Physician


History of Present Illness: 





Pt seen and examined at bedside. She appears uncomfortable today. 





- Current Medication List


Current Medications: 


Active Medications





Acetaminophen (Ofirmev Injection -)  500 mg IVPB Q6H PRN


   PRN Reason: PAIN OR FEVER


Albuterol/Ipratropium (Duoneb -)  1 amp NEB Q4H PRN


   PRN Reason: SHORTNESS OF BREATH


   Last Admin: 09/03/19 07:58 Dose:  1 amp


Metronidazole (Flagyl 500mg Premixed Ivpb -)  500 mg in 100 mls @ 100 mls/hr 

IVPB Q8H [facility]


   Last Admin: 09/03/19 12:33 Dose:  100 mls/hr


Piperacillin Sod/Tazobactam (Sod 3.375 gm/ Dextrose)  50 mls @ 100 mls/hr IVPB 

Q8H [facility]; Protocol


   Last Admin: 09/03/19 12:34 Dose:  100 mls/hr


Potassium Chloride 20 meq/ (Amino Acids)  1,010 mls @ 60 mls/hr IVPB Q12H [facility]


   Last Admin: 09/03/19 08:15 Dose:  60 mls/hr


Fat Emulsion Intravenous (Intralipid -)  250 mls @ 20.833 mls/hr IV DAILY@2200 

[facility]


   Last Admin: 09/02/19 22:59 Dose:  20.833 mls/hr


Potassium Chloride/Sodium Chloride (Ns+20 Meq Kcl -)  20 meq in 1,000 mls @ 60 

mls/hr IV ASDIR [facility]


   Last Admin: 09/03/19 00:57 Dose:  Not Given


Potassium Phosphate 15 mm/ (Dextrose)  255 mls @ 62.5 mls/hr IVPB ONCE ONE


   Stop: 09/03/19 14:04


Levetiracetam (Keppra Injection -)  500 mg IVPB BID [facility]


   Last Admin: 09/03/19 10:25 Dose:  500 mg


Metoclopramide HCl (Reglan Injection -)  10 mg IVPUSH Q6H PRN


   PRN Reason: NAUSEA AND/OR VOMITING


   Last Admin: 09/03/19 01:03 Dose:  10 mg


Morphine Sulfate (Morphine Sulfate)  0.5 mg IVPUSH Q4H PRN


   PRN Reason: PAIN LEVEL 6-10


   Last Admin: 09/03/19 12:32 Dose:  0.5 mg


Ondansetron HCl (Zofran Injection)  4 mg IVPUSH Q6H PRN


   PRN Reason: NAUSEA


   Last Admin: 09/03/19 13:00 Dose:  4 mg


Vancomycin HCl (Vancomycin (Pre-Docked))  1,000 mg IVPB Q24H [facility]; Protocol











- Objective


Vital Signs: 


 Vital Signs











Temperature  96.8 F L  09/03/19 06:53


 


Pulse Rate  111 H  09/03/19 06:53


 


Respiratory Rate  20   09/03/19 06:53


 


Blood Pressure  117/84   09/03/19 06:53


 


O2 Sat by Pulse Oximetry (%)  100   09/03/19 01:00











Constitutional: Yes: Mild Distress


Cardiovascular: Yes: S1, S2


Respiratory: Yes: CTA Bilaterally


Gastrointestinal: Yes: Tenderness


Genitourinary: Yes: Incontinence


Extremities: Yes: Other (contracted)


Edema: No


Neurological: Yes: Confusion


Labs: 


 CBC, BMP





 09/03/19 06:07 





 09/03/19 06:07 





 INR, PTT











INR  1.00  (0.82-1.09)   08/22/19  17:22    














Problem List





- Problems


(1) Hyperkalemia


Code(s): E87.5 - HYPERKALEMIA   





(2) Hypernatremia


Code(s): E87.0 - HYPEROSMOLALITY AND HYPERNATREMIA   





(3) SBO (small bowel obstruction)


Code(s): K56.609 - UNSP INTESTNL OBST, UNSP AS TO PARTIAL VERSUS COMPLETE OBST 

  





(4) Seizure


Code(s): R56.9 - UNSPECIFIED CONVULSIONS   





Assessment/Plan


 Current Medications











Generic Name Dose Route Start Last Admin





  Trade Name Freq  PRN Reason Stop Dose Admin


 


Acetaminophen  500 mg  09/01/19 18:53  





  Ofirmev Injection -  IVPB   





  Q6H PRN   





  PAIN OR FEVER   





     





     





     


 


Albuterol/Ipratropium  1 amp  09/02/19 10:32  09/03/19 07:58





  Duoneb -  NEB   1 amp





  Q4H PRN   Administration





  SHORTNESS OF BREATH   





     





     





     


 


Metronidazole  500 mg in 100 mls @ 100 mls/hr  09/01/19 19:30  09/03/19 12:33





  Flagyl 500mg Premixed Ivpb -  IVPB   100 mls/hr





  Q8H ROSE MARIE   Administration





     





     





     





     


 


Piperacillin Sod/Tazobactam  50 mls @ 100 mls/hr  09/01/19 19:30  09/03/19 12:34





  Sod 3.375 gm/ Dextrose  IVPB   100 mls/hr





  Q8H ROSE MARIE   Administration





     





     





  Protocol   





     


 


Potassium Chloride 20 meq/  1,010 mls @ 60 mls/hr  09/01/19 20:00  09/03/19 08:

15





  Amino Acids  IVPB   60 mls/hr





  Q12H ROSE MARIE   Administration





     





     





     





     


 


Fat Emulsion Intravenous  250 mls @ 20.833 mls/hr  09/01/19 22:00  09/02/19 22:

59





  Intralipid -  IV   20.833 mls/hr





  DAILY@2200 ROSE MARIE   Administration





     





     





     





     


 


Potassium Chloride/Sodium Chloride  20 meq in 1,000 mls @ 60 mls/hr  09/02/19 23

:58  09/03/19 00:57





  Ns+20 Meq Kcl -  IV   Not Given





  ASDIR [facility]   





     





     





     





     


 


Potassium Phosphate 15 mm/  255 mls @ 62.5 mls/hr  09/03/19 10:00  





  Dextrose  IVPB  09/03/19 14:04  





  ONCE ONE   





     





     





     





     


 


Levetiracetam  500 mg  09/01/19 22:00  09/03/19 10:25





  Keppra Injection -  IVPB   500 mg





  BID ROSE MARIE   Administration





     





     





     





     


 


Metoclopramide HCl  10 mg  09/01/19 18:55  09/03/19 01:03





  Reglan Injection -  IVPUSH   10 mg





  Q6H PRN   Administration





  NAUSEA AND/OR VOMITING   





     





     





     


 


Morphine Sulfate  0.5 mg  09/02/19 16:31  09/03/19 12:32





  Morphine Sulfate  IVPUSH   0.5 mg





  Q4H PRN   Administration





  PAIN LEVEL 6-10   





     





     





     


 


Ondansetron HCl  4 mg  09/01/19 18:55  09/03/19 13:00





  Zofran Injection  IVPUSH   4 mg





  Q6H PRN   Administration





  NAUSEA   





     





     





     


 


Vancomycin HCl  1,000 mg  09/03/19 15:00  





  Vancomycin (Pre-Docked)  IVPB   





  Q24H [facility]   





     





     





  Protocol   





     

















Impression


1. hypernatremia


2. hyperkalemia - resolved


3. SBO


4. epilepsy


5. developemental delay


6. hypokalemia





Plan


- replace potassium


- monitor lytes


- surgery input appreciated


- pt still with obstruction


- family will discuss GOC

## 2019-09-04 LAB
ALBUMIN SERPL-MCNC: 1.7 G/DL (ref 3.4–5)
ALP SERPL-CCNC: 94 U/L (ref 45–117)
ALT SERPL-CCNC: 7 U/L (ref 13–61)
ANION GAP SERPL CALC-SCNC: 10 MMOL/L (ref 8–16)
ANISOCYTOSIS BLD QL: 0
AST SERPL-CCNC: 13 U/L (ref 15–37)
BASOPHILS # BLD: 0.1 % (ref 0–2)
BASOPHILS # BLD: 0.2 % (ref 0–2)
BILIRUB SERPL-MCNC: 0.4 MG/DL (ref 0.2–1)
BUN SERPL-MCNC: 9.7 MG/DL (ref 7–18)
CALCIUM SERPL-MCNC: 7.9 MG/DL (ref 8.5–10.1)
CHLORIDE SERPL-SCNC: 103 MMOL/L (ref 98–107)
CHOLEST SERPL-MCNC: 80 MG/DL (ref 50–200)
CO2 SERPL-SCNC: 29 MMOL/L (ref 21–32)
CREAT SERPL-MCNC: < 0.2 MG/DL (ref 0.55–1.3)
DEPRECATED RDW RBC AUTO: 14.9 % (ref 11.6–15.6)
DEPRECATED RDW RBC AUTO: 15.5 % (ref 11.6–15.6)
EOSINOPHIL # BLD: 0 % (ref 0–4.5)
EOSINOPHIL # BLD: 0 % (ref 0–4.5)
GLUCOSE SERPL-MCNC: 106 MG/DL (ref 74–106)
HCT VFR BLD CALC: 24.4 % (ref 32.4–45.2)
HCT VFR BLD CALC: 24.6 % (ref 32.4–45.2)
HDLC SERPL-MCNC: 51 MG/DL (ref 40–60)
HGB BLD-MCNC: 7.7 GM/DL (ref 10.7–15.3)
HGB BLD-MCNC: 7.9 GM/DL (ref 10.7–15.3)
LYMPHOCYTES # BLD: 3.9 % (ref 8–40)
LYMPHOCYTES # BLD: 4.7 % (ref 8–40)
MACROCYTES BLD QL: 0
MAGNESIUM SERPL-MCNC: 1.9 MG/DL (ref 1.8–2.4)
MCH RBC QN AUTO: 27.1 PG (ref 25.7–33.7)
MCH RBC QN AUTO: 27.8 PG (ref 25.7–33.7)
MCHC RBC AUTO-ENTMCNC: 31.3 G/DL (ref 32–36)
MCHC RBC AUTO-ENTMCNC: 32.5 G/DL (ref 32–36)
MCV RBC: 85.5 FL (ref 80–96)
MCV RBC: 86.5 FL (ref 80–96)
MONOCYTES # BLD AUTO: 3 % (ref 3.8–10.2)
MONOCYTES # BLD AUTO: 3.2 % (ref 3.8–10.2)
NEUTROPHILS # BLD: 91.9 % (ref 42.8–82.8)
NEUTROPHILS # BLD: 93 % (ref 42.8–82.8)
PHOSPHATE SERPL-MCNC: 0.6 MG/DL (ref 2.5–4.9)
PLATELET # BLD AUTO: 178 K/MM3 (ref 134–434)
PLATELET # BLD AUTO: 190 K/MM3 (ref 134–434)
PLATELET BLD QL SMEAR: NORMAL
PMV BLD: 9.2 FL (ref 7.5–11.1)
PMV BLD: 9.4 FL (ref 7.5–11.1)
POTASSIUM SERPLBLD-SCNC: 4.1 MMOL/L (ref 3.5–5.1)
PROT SERPL-MCNC: 4.6 G/DL (ref 6.4–8.2)
RBC # BLD AUTO: 2.84 M/MM3 (ref 3.6–5.2)
RBC # BLD AUTO: 2.85 M/MM3 (ref 3.6–5.2)
SODIUM SERPL-SCNC: 141 MMOL/L (ref 136–145)
TRIGL SERPL-MCNC: 42 MG/DL (ref 0–150)
WBC # BLD AUTO: 12.5 K/MM3 (ref 4–10)
WBC # BLD AUTO: 15.4 K/MM3 (ref 4–10)

## 2019-09-04 RX ADMIN — VANCOMYCIN HYDROCHLORIDE SCH MG: 1 INJECTION, POWDER, LYOPHILIZED, FOR SOLUTION INTRAVENOUS at 16:03

## 2019-09-04 RX ADMIN — MORPHINE SULFATE PRN MG: 2 INJECTION, SOLUTION INTRAMUSCULAR; INTRAVENOUS at 16:02

## 2019-09-04 RX ADMIN — MORPHINE SULFATE PRN MG: 2 INJECTION, SOLUTION INTRAMUSCULAR; INTRAVENOUS at 22:01

## 2019-09-04 RX ADMIN — ACETAMINOPHEN PRN MG: 10 INJECTION, SOLUTION INTRAVENOUS at 18:20

## 2019-09-04 RX ADMIN — PIPERACILLIN SODIUM,TAZOBACTAM SODIUM SCH MLS/HR: 3; .375 INJECTION, POWDER, FOR SOLUTION INTRAVENOUS at 12:06

## 2019-09-04 RX ADMIN — MORPHINE SULFATE PRN MG: 2 INJECTION, SOLUTION INTRAMUSCULAR; INTRAVENOUS at 03:21

## 2019-09-04 RX ADMIN — POTASSIUM CHLORIDE SCH: 149 INJECTION, SOLUTION, CONCENTRATE INTRAVENOUS at 22:19

## 2019-09-04 RX ADMIN — POTASSIUM CHLORIDE AND SODIUM CHLORIDE SCH: 900; 150 INJECTION, SOLUTION INTRAVENOUS at 01:34

## 2019-09-04 RX ADMIN — LEVETIRACETAM SCH MG: 100 INJECTION, SOLUTION, CONCENTRATE INTRAVENOUS at 09:46

## 2019-09-04 RX ADMIN — POTASSIUM CHLORIDE SCH MLS/HR: 149 INJECTION, SOLUTION, CONCENTRATE INTRAVENOUS at 19:00

## 2019-09-04 RX ADMIN — MORPHINE SULFATE PRN MG: 2 INJECTION, SOLUTION INTRAMUSCULAR; INTRAVENOUS at 08:51

## 2019-09-04 RX ADMIN — PIPERACILLIN SODIUM,TAZOBACTAM SODIUM SCH MLS/HR: 3; .375 INJECTION, POWDER, FOR SOLUTION INTRAVENOUS at 19:51

## 2019-09-04 RX ADMIN — PIPERACILLIN SODIUM,TAZOBACTAM SODIUM SCH MLS/HR: 3; .375 INJECTION, POWDER, FOR SOLUTION INTRAVENOUS at 03:21

## 2019-09-04 RX ADMIN — IPRATROPIUM BROMIDE AND ALBUTEROL SULFATE PRN AMP: .5; 3 SOLUTION RESPIRATORY (INHALATION) at 19:50

## 2019-09-04 RX ADMIN — METOCLOPRAMIDE PRN MG: 5 INJECTION, SOLUTION INTRAMUSCULAR; INTRAVENOUS at 03:22

## 2019-09-04 RX ADMIN — POTASSIUM CHLORIDE SCH: 149 INJECTION, SOLUTION, CONCENTRATE INTRAVENOUS at 08:41

## 2019-09-04 RX ADMIN — MORPHINE SULFATE PRN MG: 2 INJECTION, SOLUTION INTRAMUSCULAR; INTRAVENOUS at 12:58

## 2019-09-04 RX ADMIN — LEVETIRACETAM SCH MG: 100 INJECTION, SOLUTION, CONCENTRATE INTRAVENOUS at 22:01

## 2019-09-04 RX ADMIN — MORPHINE SULFATE PRN MG: 2 INJECTION, SOLUTION INTRAMUSCULAR; INTRAVENOUS at 19:03

## 2019-09-04 RX ADMIN — POTASSIUM CHLORIDE AND SODIUM CHLORIDE SCH MLS/HR: 900; 150 INJECTION, SOLUTION INTRAVENOUS at 19:02

## 2019-09-04 RX ADMIN — IPRATROPIUM BROMIDE AND ALBUTEROL SULFATE PRN AMP: .5; 3 SOLUTION RESPIRATORY (INHALATION) at 06:42

## 2019-09-04 NOTE — PN
Progress Note, Physician


History of Present Illness: 





Pt seen and examined at bedside. She appears uncomfortable. 





- Current Medication List


Current Medications: 


Active Medications





Acetaminophen (Ofirmev Injection -)  500 mg IVPB Q6H PRN


   PRN Reason: PAIN OR FEVER


Albuterol/Ipratropium (Duoneb -)  1 amp NEB Q4H PRN


   PRN Reason: SHORTNESS OF BREATH


   Last Admin: 09/04/19 06:42 Dose:  1 amp


Metronidazole (Flagyl 500mg Premixed Ivpb -)  500 mg in 100 mls @ 100 mls/hr 

IVPB Q8H [facility]


   Last Admin: 09/04/19 10:54 Dose:  100 mls/hr


Piperacillin Sod/Tazobactam (Sod 3.375 gm/ Dextrose)  50 mls @ 100 mls/hr IVPB 

Q8H [facility]; Protocol


   Last Admin: 09/04/19 03:21 Dose:  100 mls/hr


Potassium Chloride 20 meq/ (Amino Acids)  1,010 mls @ 60 mls/hr IVPB Q12H [facility]


   Last Admin: 09/04/19 08:41 Dose:  Not Given


Potassium Chloride/Sodium Chloride (Ns+20 Meq Kcl -)  20 meq in 1,000 mls @ 60 

mls/hr IV ASDIR [facility]


   Last Admin: 09/04/19 01:34 Dose:  Not Given


Fat Emulsion Intravenous (Intralipid -)  250 mls @ 20.833 mls/hr IV Q2D@2200 [facility]


Sodium Phosphate 45 mm/ (Dextrose)  515 mls @ 64.37 mls/hr IVPB ONCE ONE


   Stop: 09/04/19 18:00


Levetiracetam (Keppra Injection -)  500 mg IVPB BID [facility]


   Last Admin: 09/04/19 09:46 Dose:  500 mg


Metoclopramide HCl (Reglan Injection -)  10 mg IVPUSH Q6H PRN


   PRN Reason: NAUSEA AND/OR VOMITING


   Last Admin: 09/04/19 03:22 Dose:  10 mg


Morphine Sulfate (Morphine Sulfate)  0.5 mg IVPUSH Q4H PRN


   PRN Reason: PAIN LEVEL 6-10


   Last Admin: 09/04/19 08:51 Dose:  0.5 mg


Ondansetron HCl (Zofran Injection)  4 mg IVPUSH Q6H PRN


   PRN Reason: NAUSEA


   Last Admin: 09/03/19 23:36 Dose:  4 mg


Vancomycin HCl (Vancomycin (Pre-Docked))  1,000 mg IVPB Q24H [facility]; Protocol


   Last Admin: 09/03/19 17:59 Dose:  1,000 mg











- Objective


Vital Signs: 


 Vital Signs











Temperature  97.7 F   09/04/19 06:00


 


Pulse Rate  114 H  09/04/19 06:00


 


Respiratory Rate  19 09/04/19 06:00


 


Blood Pressure  112/64   09/04/19 06:00


 


O2 Sat by Pulse Oximetry (%)  100   09/03/19 23:00











Constitutional: Yes: Calm


Eyes: Yes: Conjunctiva Clear


HENT: Yes: Atraumatic


Cardiovascular: Yes: S1, S2


Respiratory: Yes: On Nasal O2


Gastrointestinal: Yes: Distention


Genitourinary: Yes: Incontinence


Musculoskeletal: Yes: Muscle Weakness


Extremities: Yes: Other (contracted)


Neurological: Yes: Pre-Existing Deficit


Labs: 


 CBC, BMP





 09/04/19 06:00 





 09/04/19 06:00 





 INR, PTT











INR  1.00  (0.82-1.09)   08/22/19  17:22    














Problem List





- Problems


(1) Hyperkalemia


Code(s): E87.5 - HYPERKALEMIA   





(2) Hypernatremia


Code(s): E87.0 - HYPEROSMOLALITY AND HYPERNATREMIA   





(3) SBO (small bowel obstruction)


Code(s): K56.609 - UNSP INTESTNL OBST, UNSP AS TO PARTIAL VERSUS COMPLETE OBST 

  





(4) Seizure


Code(s): R56.9 - UNSPECIFIED CONVULSIONS   





Assessment/Plan


 Current Medications











Generic Name Dose Route Start Last Admin





  Trade Name Keyonna  PRN Reason Stop Dose Admin


 


Acetaminophen  500 mg  09/01/19 18:53  





  Ofirmev Injection -  IVPB   





  Q6H PRN   





  PAIN OR FEVER   





     





     





     


 


Albuterol/Ipratropium  1 amp  09/02/19 10:32  09/04/19 06:42





  Duoneb -  NEB   1 amp





  Q4H PRN   Administration





  SHORTNESS OF BREATH   





     





     





     


 


Metronidazole  500 mg in 100 mls @ 100 mls/hr  09/01/19 19:30  09/04/19 10:54





  Flagyl 500mg Premixed Ivpb -  IVPB   100 mls/hr





  Q8H [facility]   Administration





     





     





     





     


 


Piperacillin Sod/Tazobactam  50 mls @ 100 mls/hr  09/01/19 19:30  09/04/19 03:21





  Sod 3.375 gm/ Dextrose  IVPB   100 mls/hr





  Q8H [facility]   Administration





     





     





  Protocol   





     


 


Potassium Chloride 20 meq/  1,010 mls @ 60 mls/hr  09/01/19 20:00  09/04/19 08:

41





  Amino Acids  IVPB   Not Given





  Q12H [facility]   





     





     





     





     


 


Potassium Chloride/Sodium Chloride  20 meq in 1,000 mls @ 60 mls/hr  09/02/19 23

:58  09/04/19 01:34





  Ns+20 Meq Kcl -  IV   Not Given





  ASDIR [facility]   





     





     





     





     


 


Fat Emulsion Intravenous  250 mls @ 20.833 mls/hr  09/05/19 22:00  





  Intralipid -  IV   





  Q2D@2200 [facility]   





     





     





     





     


 


Sodium Phosphate 45 mm/  515 mls @ 64.37 mls/hr  09/04/19 10:00  





  Dextrose  IVPB  09/04/19 18:00  





  ONCE ONE   





     





     





     





  45 MM/8 HR   


 


Levetiracetam  500 mg  09/01/19 22:00  09/04/19 09:46





  Keppra Injection -  IVPB   500 mg





  BID [facility]   Administration





     





     





     





     


 


Metoclopramide HCl  10 mg  09/01/19 18:55  09/04/19 03:22





  Reglan Injection -  IVPUSH   10 mg





  Q6H PRN   Administration





  NAUSEA AND/OR VOMITING   





     





     





     


 


Morphine Sulfate  0.5 mg  09/02/19 16:31  09/04/19 08:51





  Morphine Sulfate  IVPUSH   0.5 mg





  Q4H PRN   Administration





  PAIN LEVEL 6-10   





     





     





     


 


Ondansetron HCl  4 mg  09/01/19 18:55  09/03/19 23:36





  Zofran Injection  IVPUSH   4 mg





  Q6H PRN   Administration





  NAUSEA   





     





     





     


 


Vancomycin HCl  1,000 mg  09/03/19 15:00  09/03/19 17:59





  Vancomycin (Pre-Docked)  IVPB   1,000 mg





  Q24H [facility]   Administration





     





     





  Protocol   





     

















Impression


1. hypernatremia


2. hyperkalemia - resolved


3. SBO


4. epilepsy


5. developemental delay


6. hypokalemia





Plan


- replace phos


- monitor lytes


- surgery follow up


- pt still with obstruction

## 2019-09-04 NOTE — PN
Progress Note, Physician


Chief Complaint: 





having some discomfort this morning as evidenced by increased facial grimacing 

and moaning. discussed with sisters at the bedside and they are in agreement to 

give morphine q 3 instead of q 4 to help her discomfort. 





History of Present Illness: 





Patient is a 59 year old female (resident of [facility]) with a PMHx 

significant for profound mental retardation, cerebral palsy, congenital 

quadriplegia, severe scoliosis, seizure disorder, and GERD.  Transferred from 

[place] to [facility] for further management of SBO, sepsis and tachycardia.  








critically low phos level reported, potassium phos ordered again today by 

renal.  


labs showing low hmg/hct will repeat.








- Current Medication List


Current Medications: 


Active Medications





Acetaminophen (Ofirmev Injection -)  500 mg IVPB Q6H PRN


   PRN Reason: PAIN OR FEVER


Albuterol/Ipratropium (Duoneb -)  1 amp NEB Q4H PRN


   PRN Reason: SHORTNESS OF BREATH


   Last Admin: 09/04/19 06:42 Dose:  1 amp


Metronidazole (Flagyl 500mg Premixed Ivpb -)  500 mg in 100 mls @ 100 mls/hr 

IVPB Q8H [facility]


   Last Admin: 09/04/19 10:54 Dose:  100 mls/hr


Piperacillin Sod/Tazobactam (Sod 3.375 gm/ Dextrose)  50 mls @ 100 mls/hr IVPB 

Q8H [facility]; Protocol


   Last Admin: 09/04/19 12:06 Dose:  100 mls/hr


Potassium Chloride 20 meq/ (Amino Acids)  1,010 mls @ 60 mls/hr IVPB Q12H [facility]


   Last Admin: 09/04/19 08:41 Dose:  Not Given


Potassium Chloride/Sodium Chloride (Ns+20 Meq Kcl -)  20 meq in 1,000 mls @ 60 

mls/hr IV ASDIR [facility]


   Last Admin: 09/04/19 01:34 Dose:  Not Given


Fat Emulsion Intravenous (Intralipid -)  250 mls @ 20.833 mls/hr IV Q2D@2200 [facility]


Sodium Phosphate 45 mm/ (Dextrose)  515 mls @ 64.37 mls/hr IVPB ONCE ONE


   Stop: 09/04/19 18:00


Levetiracetam (Keppra Injection -)  500 mg IVPB BID [facility]


   Last Admin: 09/04/19 09:46 Dose:  500 mg


Metoclopramide HCl (Reglan Injection -)  10 mg IVPUSH Q6H PRN


   PRN Reason: NAUSEA AND/OR VOMITING


   Last Admin: 09/04/19 03:22 Dose:  10 mg


Morphine Sulfate (Morphine Sulfate)  0.5 mg IVPUSH Q3H PRN


   PRN Reason: PAIN LEVEL 6-10


Ondansetron HCl (Zofran Injection)  4 mg IVPUSH Q6H PRN


   PRN Reason: NAUSEA


   Last Admin: 09/03/19 23:36 Dose:  4 mg


Vancomycin HCl (Vancomycin (Pre-Docked))  1,000 mg IVPB Q24H [facility]; Protocol


   Last Admin: 09/03/19 17:59 Dose:  1,000 mg











- Objective


Vital Signs: 


 Vital Signs











Temperature  97.7 F   09/04/19 06:00


 


Pulse Rate  114 H  09/04/19 06:00


 


Respiratory Rate  19 09/04/19 06:00


 


Blood Pressure  112/64   09/04/19 06:00


 


O2 Sat by Pulse Oximetry (%)  100   09/03/19 23:00











Constitutional: Yes: Anxious, Mild Distress


Eyes: Yes: WNL


HENT: Yes: Atraumatic


Neck: Yes: Supple


Cardiovascular: Yes: Tachycardia


Respiratory: Yes: On Nasal O2


Gastrointestinal: Yes: Distention


...Rectal Exam: Yes: Deferred


Musculoskeletal: Yes: Other (contracted)


Edema: No


Integumentary: Yes: Other


Neurological: Yes: Lethargy


Labs: 


 CBC, BMP





 09/04/19 06:00 





 09/04/19 06:00 





 INR, PTT











INR  1.00  (0.82-1.09)   08/22/19  17:22    














Problem List





- Problems


(1) SBO (small bowel obstruction)


Assessment/Plan: 





NGT to low intermittent suction


abd xray with sbo, persists.  surgery notes reviewed


abdomen distended, absent bowel sounds 


monitor drain output


surgery following, notes appreciated


Code(s): K56.609 - UNSP INTESTNL OBST, UNSP AS TO PARTIAL VERSUS COMPLETE OBST 

  





(2) Sepsis


Assessment/Plan: 


on zosyn, vanco and flagyl


id following


Code(s): A41.9 - SEPSIS, UNSPECIFIED ORGANISM   





(3) Aspiration into airway


Assessment/Plan: 


chest xray shows left lower lobe infiltrate.  on zosyn.


support with 2 liters of nasal cannula


monitor oxygen levels and titrate off oxygen as tolerated.  not home oxygen 

dependent at home. 


Code(s): T17.908A - UNSP FB IN RESP TRACT, PART UNSP CAUSING OTH INJURY, INIT   





(4) Decreased oral intake


Assessment/Plan: 


NPO.  on clinimax, lipids   


Code(s): R63.8 - OTHER SYMPTOMS AND SIGNS CONCERNING FOOD AND FLUID INTAKE   





(5) Seizure


Assessment/Plan: 


on keppra.  


on tegratol in NH, continue keppra iv bid.  once no longer npo, can restart 

tegretol. 





Code(s): R56.9 - UNSPECIFIED CONVULSIONS   





(6) Hypernatremia


Assessment/Plan: 


renal consulted for electrolyte imbalance.  recommendations appreciated. 


Code(s): E87.0 - HYPEROSMOLALITY AND HYPERNATREMIA   





(7) Hyperkalemia


Assessment/Plan: 


resolved.  monitor K daily


Code(s): E87.5 - HYPERKALEMIA   





(8) Profound mental handicap


Assessment/Plan: 


supportive care.





Code(s): F73 - PROFOUND INTELLECTUAL DISABILITIES   





(9) Tachycardia


Assessment/Plan: 


tachycardia likely in the setting of abdominal pain, anxiety, NGT- improving. 


 


Code(s): R00.0 - TACHYCARDIA, UNSPECIFIED   





(10) Left pulmonary infiltrate on CXR


Assessment/Plan: 


apply oxygen 2 liters.  patient noted to be congested and breathing rate 24s


on zosyn


 


Code(s): R91.8 - OTHER NONSPECIFIC ABNORMAL FINDING OF LUNG FIELD   





(11) Low serum phosphorus for age


Assessment/Plan: 


repleted with iv phos. 


repeat in a.m 


Code(s): R79.0 - ABNORMAL LEVEL OF BLOOD MINERAL   





(12) Prophylactic measure


Assessment/Plan: 


fen


npo


on clinimax


monitor electrolytes





full code





Code(s): Z29.9 - ENCOUNTER FOR PROPHYLACTIC MEASURES, UNSPECIFIED   





Visit type





- Emergency Visit


Emergency Visit: Yes


ED Registration Date: 08/22/19


Care time: The patient presented to the Emergency Department on the above date 

and was hospitalized for further evaluation of their emergent condition.





- New Patient


This patient is new to me today: No





- Critical Care


Critical Care patient: No





- Discharge Referral


Referred to [facility] No

## 2019-09-04 NOTE — PN
Progress Note, Physician


History of Present Illness: 





more calmer


family in room





- Current Medication List


Current Medications: 


Active Medications





Acetaminophen (Ofirmev Injection -)  500 mg IVPB Q6H PRN


   PRN Reason: PAIN OR FEVER


Albuterol/Ipratropium (Duoneb -)  1 amp NEB Q4H PRN


   PRN Reason: SHORTNESS OF BREATH


   Last Admin: 09/04/19 06:42 Dose:  1 amp


Metronidazole (Flagyl 500mg Premixed Ivpb -)  500 mg in 100 mls @ 100 mls/hr 

IVPB Q8H [facility]


   Last Admin: 09/04/19 03:21 Dose:  100 mls/hr


Piperacillin Sod/Tazobactam (Sod 3.375 gm/ Dextrose)  50 mls @ 100 mls/hr IVPB 

Q8H [facility]; Protocol


   Last Admin: 09/04/19 03:21 Dose:  100 mls/hr


Potassium Chloride 20 meq/ (Amino Acids)  1,010 mls @ 60 mls/hr IVPB Q12H [facility]


   Last Admin: 09/04/19 08:41 Dose:  Not Given


Potassium Chloride/Sodium Chloride (Ns+20 Meq Kcl -)  20 meq in 1,000 mls @ 60 

mls/hr IV ASDIR [facility]


   Last Admin: 09/04/19 01:34 Dose:  Not Given


Fat Emulsion Intravenous (Intralipid -)  250 mls @ 20.833 mls/hr IV Q2D@2200 [facility]


Sodium Phosphate 45 mm/ (Dextrose)  265 mls @ 62.5 mls/hr IVPB ONCE ONE


   Stop: 09/04/19 12:56


Levetiracetam (Keppra Injection -)  500 mg IVPB BID Replaced by [facility]


   Last Admin: 09/03/19 21:56 Dose:  500 mg


Metoclopramide HCl (Reglan Injection -)  10 mg IVPUSH Q6H PRN


   PRN Reason: NAUSEA AND/OR VOMITING


   Last Admin: 09/04/19 03:22 Dose:  10 mg


Morphine Sulfate (Morphine Sulfate)  0.5 mg IVPUSH Q4H PRN


   PRN Reason: PAIN LEVEL 6-10


   Last Admin: 09/04/19 08:51 Dose:  0.5 mg


Ondansetron HCl (Zofran Injection)  4 mg IVPUSH Q6H PRN


   PRN Reason: NAUSEA


   Last Admin: 09/03/19 23:36 Dose:  4 mg


Vancomycin HCl (Vancomycin (Pre-Docked))  1,000 mg IVPB Q24H Replaced by [facility]; Protocol


   Last Admin: 09/03/19 17:59 Dose:  1,000 mg











- Objective


Vital Signs: 


 Vital Signs











Temperature  97.7 F   09/04/19 06:00


 


Pulse Rate  114 H  09/04/19 06:00


 


Respiratory Rate  19 09/04/19 06:00


 


Blood Pressure  112/64   09/04/19 06:00


 


O2 Sat by Pulse Oximetry (%)  100   09/03/19 23:00











Constitutional: Yes: Calm


Cardiovascular: Yes: S1, S2


Respiratory: Yes: Regular, Poor Air Entry


Gastrointestinal: Yes: Soft, Other (ng in place)


Musculoskeletal: Yes: WNL


Extremities: Yes: Other


Neurological: Yes: Other


Psychiatric: Yes: Other


Labs: 


 CBC, BMP





 09/04/19 06:00 





 09/04/19 06:00 





 INR, PTT











INR  1.00  (0.82-1.09)   08/22/19  17:22    














Assessment/Plan





59 year-old female resident of [facility], with a PMH significant for 

profound mental retardation, cerebral palsy, congenital quadriplegia, severe 

scoliosis, seizure disorder, and GERD. Admitted for SBO.





SBO


Profound mental retardation


Cerebral palsy


Congenital quadriplegia


Severe scoliosis


Seizure disorder


GERD





plan


continue abx


nutrition


rest as per the team

## 2019-09-04 NOTE — PN
Progress Note (short form)





- Note


Progress Note: 








S: nonverbal, appears uncomfortable


 


  Current Medications





Acetaminophen (Ofirmev Injection -)  500 mg IVPB Q6H PRN


   PRN Reason: PAIN OR FEVER


Albuterol/Ipratropium (Duoneb -)  1 amp NEB Q4H PRN


   PRN Reason: SHORTNESS OF BREATH


   Last Admin: 09/04/19 06:42 Dose:  1 amp


Metronidazole (Flagyl 500mg Premixed Ivpb -)  500 mg in 100 mls @ 100 mls/hr 

IVPB Q8H [facility]


   Last Admin: 09/04/19 03:21 Dose:  100 mls/hr


Piperacillin Sod/Tazobactam (Sod 3.375 gm/ Dextrose)  50 mls @ 100 mls/hr IVPB 

Q8H [facility]; Protocol


   Last Admin: 09/04/19 03:21 Dose:  100 mls/hr


Potassium Chloride 20 meq/ (Amino Acids)  1,010 mls @ 60 mls/hr IVPB Q12H [facility]


   Last Admin: 09/04/19 08:41 Dose:  Not Given


Potassium Chloride/Sodium Chloride (Ns+20 Meq Kcl -)  20 meq in 1,000 mls @ 60 

mls/hr IV ASDIR [facility]


   Last Admin: 09/04/19 01:34 Dose:  Not Given


Fat Emulsion Intravenous (Intralipid -)  250 mls @ 20.833 mls/hr IV Q2D@2200 [facility]


Sodium Phosphate 45 mm/ (Dextrose)  515 mls @ 64.37 mls/hr IVPB ONCE ONE


   Stop: 09/04/19 18:00


Levetiracetam (Keppra Injection -)  500 mg IVPB BID [facility]


   Last Admin: 09/04/19 09:46 Dose:  500 mg


Metoclopramide HCl (Reglan Injection -)  10 mg IVPUSH Q6H PRN


   PRN Reason: NAUSEA AND/OR VOMITING


   Last Admin: 09/04/19 03:22 Dose:  10 mg


Morphine Sulfate (Morphine Sulfate)  0.5 mg IVPUSH Q4H PRN


   PRN Reason: PAIN LEVEL 6-10


   Last Admin: 09/04/19 08:51 Dose:  0.5 mg


Ondansetron HCl (Zofran Injection)  4 mg IVPUSH Q6H PRN


   PRN Reason: NAUSEA


   Last Admin: 09/03/19 23:36 Dose:  4 mg


Vancomycin HCl (Vancomycin (Pre-Docked))  1,000 mg IVPB Q24H [facility]; Protocol


   Last Admin: 09/03/19 17:59 Dose:  1,000 mg


 Vital Signs











 Period  Temp  Pulse  Resp  BP Sys/Carlson  Pulse Ox


 


 Last 24 Hr  97.5 F-99.3 F  101-114  18-20  101-145/58-99  100-100











Gen: NAD


Cardiovascular: Yes: Regular Rate and Rhythm


Respiratory: Yes: Rhonchi


Gastrointestinal: Yes: Soft


Edema: No


no jaundice diaphoresis


not agitated








Assessment/Plan











tele: sinus tachycardia





echo 8/2019  nl LV function, E/A reversal, RV not well visualized, mild MR





IMP:


Sinus tachycardia:


- stable


- echo unremarkable


- likely in setting of underlying pain, anxiety, SBO - improving


- trop neg, no ischemia on EKG - unlikely ACS


- can dc tele





SBO:


- manage per surgery, ngt in place





Seizure disorder:


- manage per primary

## 2019-09-05 LAB
ALBUMIN SERPL-MCNC: 1.6 G/DL (ref 3.4–5)
ALP SERPL-CCNC: 97 U/L (ref 45–117)
ALT SERPL-CCNC: 7 U/L (ref 13–61)
ANION GAP SERPL CALC-SCNC: 10 MMOL/L (ref 8–16)
ANISOCYTOSIS BLD QL: 0
AST SERPL-CCNC: 14 U/L (ref 15–37)
BASOPHILS # BLD: 0 % (ref 0–2)
BILIRUB SERPL-MCNC: 0.4 MG/DL (ref 0.2–1)
BUN SERPL-MCNC: 10 MG/DL (ref 7–18)
CALCIUM SERPL-MCNC: 7.8 MG/DL (ref 8.5–10.1)
CHLORIDE SERPL-SCNC: 102 MMOL/L (ref 98–107)
CO2 SERPL-SCNC: 28 MMOL/L (ref 21–32)
CREAT SERPL-MCNC: < 0.2 MG/DL (ref 0.55–1.3)
DEPRECATED RDW RBC AUTO: 15 % (ref 11.6–15.6)
EOSINOPHIL # BLD: 0 % (ref 0–4.5)
GLUCOSE SERPL-MCNC: 106 MG/DL (ref 74–106)
HCT VFR BLD CALC: 22.5 % (ref 32.4–45.2)
HGB BLD-MCNC: 7.3 GM/DL (ref 10.7–15.3)
IRON SERPL-MCNC: 15 UG/DL (ref 50–175)
LYMPHOCYTES # BLD: 4.8 % (ref 8–40)
MACROCYTES BLD QL: 0
MAGNESIUM SERPL-MCNC: 1.5 MG/DL (ref 1.8–2.4)
MCH RBC QN AUTO: 27.9 PG (ref 25.7–33.7)
MCHC RBC AUTO-ENTMCNC: 32.7 G/DL (ref 32–36)
MCV RBC: 85.4 FL (ref 80–96)
MONOCYTES # BLD AUTO: 4 % (ref 3.8–10.2)
NEUTROPHILS # BLD: 91.2 % (ref 42.8–82.8)
PHOSPHATE SERPL-MCNC: 1 MG/DL (ref 2.5–4.9)
PLATELET # BLD AUTO: 182 K/MM3 (ref 134–434)
PLATELET BLD QL SMEAR: NORMAL
PMV BLD: 9.2 FL (ref 7.5–11.1)
POTASSIUM SERPLBLD-SCNC: 3.4 MMOL/L (ref 3.5–5.1)
PROT SERPL-MCNC: 4.5 G/DL (ref 6.4–8.2)
RBC # BLD AUTO: 2.63 M/MM3 (ref 3.6–5.2)
SODIUM SERPL-SCNC: 140 MMOL/L (ref 136–145)
TIBC SERPL-MCNC: 165 UG/DL (ref 250–450)
WBC # BLD AUTO: 11.9 K/MM3 (ref 4–10)

## 2019-09-05 PROCEDURE — 30233N1 TRANSFUSION OF NONAUTOLOGOUS RED BLOOD CELLS INTO PERIPHERAL VEIN, PERCUTANEOUS APPROACH: ICD-10-PCS | Performed by: NURSE PRACTITIONER

## 2019-09-05 RX ADMIN — MORPHINE SULFATE PRN MG: 2 INJECTION, SOLUTION INTRAMUSCULAR; INTRAVENOUS at 00:57

## 2019-09-05 RX ADMIN — PIPERACILLIN SODIUM,TAZOBACTAM SODIUM SCH MLS/HR: 3; .375 INJECTION, POWDER, FOR SOLUTION INTRAVENOUS at 03:00

## 2019-09-05 RX ADMIN — POTASSIUM CHLORIDE SCH MLS/HR: 149 INJECTION, SOLUTION, CONCENTRATE INTRAVENOUS at 09:00

## 2019-09-05 RX ADMIN — MORPHINE SULFATE PRN MG: 2 INJECTION, SOLUTION INTRAMUSCULAR; INTRAVENOUS at 10:32

## 2019-09-05 RX ADMIN — PIPERACILLIN SODIUM,TAZOBACTAM SODIUM SCH MLS/HR: 3; .375 INJECTION, POWDER, FOR SOLUTION INTRAVENOUS at 19:59

## 2019-09-05 RX ADMIN — PANTOPRAZOLE SODIUM SCH MG: 40 INJECTION, POWDER, FOR SOLUTION INTRAVENOUS at 10:18

## 2019-09-05 RX ADMIN — VANCOMYCIN HYDROCHLORIDE SCH MG: 1 INJECTION, POWDER, LYOPHILIZED, FOR SOLUTION INTRAVENOUS at 15:45

## 2019-09-05 RX ADMIN — POTASSIUM CHLORIDE AND SODIUM CHLORIDE SCH: 900; 150 INJECTION, SOLUTION INTRAVENOUS at 00:52

## 2019-09-05 RX ADMIN — MORPHINE SULFATE PRN MG: 2 INJECTION, SOLUTION INTRAMUSCULAR; INTRAVENOUS at 14:40

## 2019-09-05 RX ADMIN — IPRATROPIUM BROMIDE AND ALBUTEROL SULFATE PRN AMP: .5; 3 SOLUTION RESPIRATORY (INHALATION) at 06:00

## 2019-09-05 RX ADMIN — LEVETIRACETAM SCH MG: 100 INJECTION, SOLUTION, CONCENTRATE INTRAVENOUS at 10:18

## 2019-09-05 RX ADMIN — MORPHINE SULFATE PRN MG: 2 INJECTION, SOLUTION INTRAMUSCULAR; INTRAVENOUS at 23:49

## 2019-09-05 RX ADMIN — KETOROLAC TROMETHAMINE PRN MG: 30 INJECTION INTRAMUSCULAR; INTRAVENOUS at 03:00

## 2019-09-05 RX ADMIN — MORPHINE SULFATE PRN MG: 2 INJECTION, SOLUTION INTRAMUSCULAR; INTRAVENOUS at 07:14

## 2019-09-05 RX ADMIN — PIPERACILLIN SODIUM,TAZOBACTAM SODIUM SCH MLS/HR: 3; .375 INJECTION, POWDER, FOR SOLUTION INTRAVENOUS at 12:16

## 2019-09-05 RX ADMIN — SCOPALAMINE SCH PATCH: 1 PATCH, EXTENDED RELEASE TRANSDERMAL at 00:58

## 2019-09-05 RX ADMIN — MORPHINE SULFATE PRN MG: 2 INJECTION, SOLUTION INTRAMUSCULAR; INTRAVENOUS at 20:49

## 2019-09-05 RX ADMIN — IPRATROPIUM BROMIDE AND ALBUTEROL SULFATE PRN AMP: .5; 3 SOLUTION RESPIRATORY (INHALATION) at 17:30

## 2019-09-05 RX ADMIN — IPRATROPIUM BROMIDE AND ALBUTEROL SULFATE PRN AMP: .5; 3 SOLUTION RESPIRATORY (INHALATION) at 01:32

## 2019-09-05 RX ADMIN — POTASSIUM CHLORIDE SCH: 149 INJECTION, SOLUTION, CONCENTRATE INTRAVENOUS at 19:59

## 2019-09-05 RX ADMIN — KETOROLAC TROMETHAMINE PRN MG: 30 INJECTION INTRAMUSCULAR; INTRAVENOUS at 16:11

## 2019-09-05 RX ADMIN — KETOROLAC TROMETHAMINE PRN MG: 30 INJECTION INTRAMUSCULAR; INTRAVENOUS at 22:19

## 2019-09-05 RX ADMIN — MORPHINE SULFATE PRN MG: 2 INJECTION, SOLUTION INTRAMUSCULAR; INTRAVENOUS at 04:10

## 2019-09-05 NOTE — PN
Progress Note, Physician





- Current Medication List


Current Medications: 


Active Medications





Acetaminophen (Ofirmev Injection -)  500 mg IVPB Q6H PRN


   PRN Reason: PAIN OR FEVER


   Last Admin: 09/04/19 18:20 Dose:  500 mg


Albuterol/Ipratropium (Duoneb -)  1 amp NEB Q4H PRN


   PRN Reason: SHORTNESS OF BREATH


   Last Admin: 09/05/19 06:00 Dose:  1 amp


Metronidazole (Flagyl 500mg Premixed Ivpb -)  500 mg in 100 mls @ 100 mls/hr 

IVPB Q8H [facility]


   Last Admin: 09/05/19 03:00 Dose:  100 mls/hr


Piperacillin Sod/Tazobactam (Sod 3.375 gm/ Dextrose)  50 mls @ 100 mls/hr IVPB 

Q8H [facility]; Protocol


   Last Admin: 09/05/19 03:00 Dose:  100 mls/hr


Potassium Chloride 20 meq/ (Amino Acids)  1,010 mls @ 60 mls/hr IVPB Q12H [facility]


   Last Admin: 09/04/19 22:19 Dose:  Not Given


Potassium Chloride/Sodium Chloride (Ns+20 Meq Kcl -)  20 meq in 1,000 mls @ 60 

mls/hr IV ASDIR [facility]


   Last Admin: 09/05/19 00:52 Dose:  Not Given


Fat Emulsion Intravenous (Intralipid -)  250 mls @ 20.833 mls/hr IV Q2D@2200 [facility]


Ketorolac Tromethamine (Toradol Injection -)  15 mg IVPUSH Q6H PRN


   PRN Reason: PAIN LEVEL 4 - 6


   Stop: 09/09/19 15:23


   Last Admin: 09/05/19 03:00 Dose:  15 mg


Levetiracetam (Keppra Injection -)  500 mg IVPB BID [facility]


   Last Admin: 09/05/19 10:18 Dose:  500 mg


Magnesium Sulfate (Magnesium Sulfate)  1 gm IVPB ONCE ONE


   Stop: 09/05/19 11:04


Metoclopramide HCl (Reglan Injection -)  10 mg IVPUSH Q6H PRN


   PRN Reason: NAUSEA AND/OR VOMITING


   Last Admin: 09/04/19 03:22 Dose:  10 mg


Morphine Sulfate (Morphine Sulfate)  0.5 mg IVPUSH Q3H PRN


   PRN Reason: PAIN LEVEL 6-10


   Last Admin: 09/05/19 10:32 Dose:  0.5 mg


Ondansetron HCl (Zofran Injection)  4 mg IVPUSH Q6H PRN


   PRN Reason: NAUSEA


   Last Admin: 09/03/19 23:36 Dose:  4 mg


Pantoprazole Sodium (Protonix Iv)  40 mg IVPUSH DAILY [facility]


   Last Admin: 09/05/19 10:18 Dose:  40 mg


Scopolamine HBr (Transderm-Scop -)  1 patch TD Q72H [facility]


   Last Admin: 09/05/19 00:58 Dose:  1 patch


Vancomycin HCl (Vancomycin (Pre-Docked))  1,000 mg IVPB Q24H [facility]; Protocol


   Last Admin: 09/04/19 16:03 Dose:  1,000 mg











- Objective


Vital Signs: 


 Vital Signs











Temperature  98.0 F   09/05/19 02:00


 


Pulse Rate  118 H  09/05/19 02:00


 


Respiratory Rate  19 09/05/19 02:00


 


Blood Pressure  115/76   09/05/19 02:00


 


O2 Sat by Pulse Oximetry (%)  100   09/04/19 22:00











Labs: 


 CBC, BMP





 09/05/19 07:14 





 09/05/19 07:14 





 INR, PTT











INR  1.00  (0.82-1.09)   08/22/19  17:22

## 2019-09-05 NOTE — PN
Progress Note (short form)





- Note


Progress Note: 





Palliative Care:





Patient was seen and a discussion with 3 of the sisters regarding their goals 

of care for Daniela.


The patient has multiple developmental and medical issues and is a resident of 

[place]. Her current focus is a SBO which has not responded to conservative 

measures and NG drainage. If the condition does not improve and surgery is not 

an option but comfort care is requested there is a process that has to be 

followed  before a DNR is ordered or life sustaining measures are stopped. That 

involves notifying Dr. Segal at [facility] and [facility].


There are forms that have to be filled out and special criteria listed. In 

addition if there is an objection by any of the 9 siblings or Dr. Segal or 

[facility] then the process will stop. Also note that the patient is still 

experiencing pain and MS can be titrated for pain relief but for no other 

reason at this point.

## 2019-09-05 NOTE — PN
Progress Note, Physician


History of Present Illness: 





Pt seen and examined at bedside. She appears more comfortable today. 





- Current Medication List


Current Medications: 


Active Medications





Acetaminophen (Ofirmev Injection -)  500 mg IVPB Q6H PRN


   PRN Reason: PAIN OR FEVER


   Last Admin: 09/04/19 18:20 Dose:  500 mg


Albuterol/Ipratropium (Duoneb -)  1 amp NEB Q4H PRN


   PRN Reason: SHORTNESS OF BREATH


   Last Admin: 09/05/19 06:00 Dose:  1 amp


Metronidazole (Flagyl 500mg Premixed Ivpb -)  500 mg in 100 mls @ 100 mls/hr 

IVPB Q8H [facility]


   Last Admin: 09/05/19 12:16 Dose:  100 mls/hr


Piperacillin Sod/Tazobactam (Sod 3.375 gm/ Dextrose)  50 mls @ 100 mls/hr IVPB 

Q8H [facility]; Protocol


   Last Admin: 09/05/19 12:16 Dose:  100 mls/hr


Potassium Chloride 20 meq/ (Amino Acids)  1,010 mls @ 60 mls/hr IVPB Q12H [facility]


   Last Admin: 09/05/19 09:00 Dose:  60 mls/hr


Potassium Chloride/Sodium Chloride (Ns+20 Meq Kcl -)  20 meq in 1,000 mls @ 60 

mls/hr IV ASDIR [facility]


   Last Admin: 09/05/19 00:52 Dose:  Not Given


Fat Emulsion Intravenous (Intralipid -)  250 mls @ 20.833 mls/hr IV Q2D@2200 [facility]


Ketorolac Tromethamine (Toradol Injection -)  15 mg IVPUSH Q6H PRN


   PRN Reason: PAIN LEVEL 4 - 6


   Stop: 09/09/19 15:23


   Last Admin: 09/05/19 03:00 Dose:  15 mg


Levetiracetam (Keppra Injection -)  500 mg IVPB BID [facility]


   Last Admin: 09/05/19 10:18 Dose:  500 mg


Magnesium Sulfate (Magnesium Sulfate)  2 gm IVPB ONCE ONE


   Stop: 09/05/19 13:46


Metoclopramide HCl (Reglan Injection -)  10 mg IVPUSH Q6H PRN


   PRN Reason: NAUSEA AND/OR VOMITING


   Last Admin: 09/04/19 03:22 Dose:  10 mg


Morphine Sulfate (Morphine Sulfate)  0.5 mg IVPUSH Q3H PRN


   PRN Reason: PAIN LEVEL 6-10


   Last Admin: 09/05/19 10:32 Dose:  0.5 mg


Ondansetron HCl (Zofran Injection)  4 mg IVPUSH Q6H PRN


   PRN Reason: NAUSEA


   Last Admin: 09/03/19 23:36 Dose:  4 mg


Pantoprazole Sodium (Protonix Iv)  40 mg IVPUSH DAILY [facility]


   Last Admin: 09/05/19 10:18 Dose:  40 mg


Scopolamine HBr (Transderm-Scop -)  1 patch TD Q72H [facility]


   Last Admin: 09/05/19 00:58 Dose:  1 patch


Vancomycin HCl (Vancomycin (Pre-Docked))  1,000 mg IVPB Q24H [facility]; Protocol


   Last Admin: 09/04/19 16:03 Dose:  1,000 mg











- Objective


Vital Signs: 


 Vital Signs











Temperature  98.0 F   09/05/19 02:00


 


Pulse Rate  118 H  09/05/19 02:00


 


Respiratory Rate  19   09/05/19 02:00


 


Blood Pressure  115/76   09/05/19 02:00


 


O2 Sat by Pulse Oximetry (%)  100   09/04/19 22:00











Constitutional: Yes: Calm


Eyes: Yes: Conjunctiva Clear


HENT: Yes: Atraumatic


Neck: Yes: Supple


Cardiovascular: Yes: S1, S2


Respiratory: Yes: On Nasal O2


Gastrointestinal: Yes: Other (ng tube)


Genitourinary: Yes: Incontinence


Extremities: Yes: Other (contracted)


Edema: No


Neurological: Yes: Pre-Existing Deficit


Labs: 


 CBC, BMP





 09/05/19 07:14 





 09/05/19 07:14 





 INR, PTT











INR  1.00  (0.82-1.09)   08/22/19  17:22    














Problem List





- Problems


(1) Hyperkalemia


Code(s): E87.5 - HYPERKALEMIA   





(2) Hypernatremia


Code(s): E87.0 - HYPEROSMOLALITY AND HYPERNATREMIA   





(3) SBO (small bowel obstruction)


Code(s): K56.609 - UNSP INTESTNL OBST, UNSP AS TO PARTIAL VERSUS COMPLETE OBST 

  





(4) Seizure


Code(s): R56.9 - UNSPECIFIED CONVULSIONS   





Assessment/Plan


 Current Medications











Generic Name Dose Route Start Last Admin





  Trade Name Freq  PRN Reason Stop Dose Admin


 


Acetaminophen  500 mg  09/01/19 18:53  09/04/19 18:20





  Ofirmev Injection -  IVPB   500 mg





  Q6H PRN   Administration





  PAIN OR FEVER   





     





     





     


 


Albuterol/Ipratropium  1 amp  09/02/19 10:32  09/05/19 06:00





  Duoneb -  NEB   1 amp





  Q4H PRN   Administration





  SHORTNESS OF BREATH   





     





     





     


 


Metronidazole  500 mg in 100 mls @ 100 mls/hr  09/01/19 19:30  09/05/19 12:16





  Flagyl 500mg Premixed Ivpb -  IVPB   100 mls/hr





  Q8H [facility]   Administration





     





     





     





     


 


Piperacillin Sod/Tazobactam  50 mls @ 100 mls/hr  09/01/19 19:30  09/05/19 12:16





  Sod 3.375 gm/ Dextrose  IVPB   100 mls/hr





  Q8H [facility]   Administration





     





     





  Protocol   





     


 


Potassium Chloride 20 meq/  1,010 mls @ 60 mls/hr  09/01/19 20:00  09/05/19 09:

00





  Amino Acids  IVPB   60 mls/hr





  Q12H [facility]   Administration





     





     





     





     


 


Potassium Chloride/Sodium Chloride  20 meq in 1,000 mls @ 60 mls/hr  09/02/19 23

:58  09/05/19 00:52





  Ns+20 Meq Kcl -  IV   Not Given





  ASDIR [facility]   





     





     





     





     


 


Fat Emulsion Intravenous  250 mls @ 20.833 mls/hr  09/05/19 22:00  





  Intralipid -  IV   





  Q2D@2200 [facility]   





     





     





     





     


 


Ketorolac Tromethamine  15 mg  09/04/19 15:24  09/05/19 03:00





  Toradol Injection -  IVPUSH  09/09/19 15:23  15 mg





  Q6H PRN   Administration





  PAIN LEVEL 4 - 6   





     





     





     


 


Levetiracetam  500 mg  09/01/19 22:00  09/05/19 10:18





  Keppra Injection -  IVPB   500 mg





  BID [facility]   Administration





     





     





     





     


 


Magnesium Sulfate  2 gm  09/05/19 13:45  





  Magnesium Sulfate  IVPB  09/05/19 13:46  





  ONCE ONE   





     





     





     





     


 


Metoclopramide HCl  10 mg  09/01/19 18:55  09/04/19 03:22





  Reglan Injection -  IVPUSH   10 mg





  Q6H PRN   Administration





  NAUSEA AND/OR VOMITING   





     





     





     


 


Morphine Sulfate  0.5 mg  09/04/19 12:06  09/05/19 10:32





  Morphine Sulfate  IVPUSH   0.5 mg





  Q3H PRN   Administration





  PAIN LEVEL 6-10   





     





     





     


 


Ondansetron HCl  4 mg  09/01/19 18:55  09/03/19 23:36





  Zofran Injection  IVPUSH   4 mg





  Q6H PRN   Administration





  NAUSEA   





     





     





     


 


Pantoprazole Sodium  40 mg  09/05/19 10:00  09/05/19 10:18





  Protonix Iv  IVPUSH   40 mg





  DAILY [facility]   Administration





     





     





     





     


 


Scopolamine HBr  1 patch  09/04/19 23:00  09/05/19 00:58





  Transderm-Scop -  TD   1 patch





  Q72H [facility]   Administration





     





     





     





     


 


Vancomycin HCl  1,000 mg  09/03/19 15:00  09/04/19 16:03





  Vancomycin (Pre-Docked)  IVPB   1,000 mg





  Q24H [facility]   Administration





     





     





  Protocol   





     











Impression


1. hypernatremia


2. hyperkalemia - resolved


3. SBO


4. epilepsy


5. developemental delay


6. hypokalemia





Plan


- replace potassium and phos


- surgery follow up


- family discussing GOC


- cont fluids for now

## 2019-09-05 NOTE — PN
Progress Note, Physician


Chief Complaint: 





appears more comfortable today, less facial grimacing


for 1 unit of prbc today for hmg 7.3 and with increased dyspnea


family in room, and POC discussed with them, they are in agreement





Spoke to Dr. Shailesh Rodriguez ([facility] MD).  He is currently covering for 

Dr Segal.  Spoke to him in presence of  Shelly 

regarding Mrs Monica hospitalization, surgery notes as well as current status.

  made him aware that we will need legal paperwork if patient was to be made a 

dnr/dni or if consents need to be sign for this patient.  Dr. Shailesh Rodriguez 

informed me that he will be reaching out to the  and will ask them to either 

call me or social work.


History of Present Illness: 





Patient is a 59 year old female (resident of [facility]) with a PMHx 

significant for profound mental retardation, cerebral palsy, congenital 

quadriplegia, severe scoliosis, seizure disorder, and GERD.  Transferred from 

[place] to [facility] for further management of SBO, sepsis and tachycardia.  





critically low phos level reported, potassium phos ordered again today.


for 1 unit of blood 


palliative care notes reviewed and noted that if patient is to be made a dnr, 

there are forms that need to be filled out and special criteria listed.  There 

are 9 siblings as well as MHLS that would have to be involved.  patient is a 

Full Code.








- Current Medication List


Current Medications: 


Active Medications





Acetaminophen (Ofirmev Injection -)  500 mg IVPB Q6H PRN


   PRN Reason: PAIN OR FEVER


   Last Admin: 09/04/19 18:20 Dose:  500 mg


Albuterol/Ipratropium (Duoneb -)  1 amp NEB Q4H PRN


   PRN Reason: SHORTNESS OF BREATH


   Last Admin: 09/05/19 06:00 Dose:  1 amp


Metronidazole (Flagyl 500mg Premixed Ivpb -)  500 mg in 100 mls @ 100 mls/hr 

IVPB Q8H [facility]


   Last Admin: 09/05/19 12:16 Dose:  100 mls/hr


Piperacillin Sod/Tazobactam (Sod 3.375 gm/ Dextrose)  50 mls @ 100 mls/hr IVPB 

Q8H [facility]; Protocol


   Last Admin: 09/05/19 12:16 Dose:  100 mls/hr


Potassium Chloride 20 meq/ (Amino Acids)  1,010 mls @ 60 mls/hr IVPB Q12H [facility]


   Last Admin: 09/05/19 09:00 Dose:  60 mls/hr


Potassium Chloride/Sodium Chloride (Ns+20 Meq Kcl -)  20 meq in 1,000 mls @ 60 

mls/hr IV ASDIR [facility]


   Last Admin: 09/05/19 00:52 Dose:  Not Given


Fat Emulsion Intravenous (Intralipid -)  250 mls @ 20.833 mls/hr IV Q2D@2200 [facility]


Ketorolac Tromethamine (Toradol Injection -)  15 mg IVPUSH Q6H PRN


   PRN Reason: PAIN LEVEL 4 - 6


   Stop: 09/09/19 15:23


   Last Admin: 09/05/19 03:00 Dose:  15 mg


Levetiracetam (Keppra Injection -)  500 mg IVPB BID [facility]


   Last Admin: 09/05/19 10:18 Dose:  500 mg


Magnesium Sulfate (Magnesium Sulfate)  2 gm IVPB ONCE ONE


   Stop: 09/05/19 13:46


Metoclopramide HCl (Reglan Injection -)  10 mg IVPUSH Q6H PRN


   PRN Reason: NAUSEA AND/OR VOMITING


   Last Admin: 09/04/19 03:22 Dose:  10 mg


Morphine Sulfate (Morphine Sulfate)  0.5 mg IVPUSH Q3H PRN


   PRN Reason: PAIN LEVEL 6-10


   Last Admin: 09/05/19 10:32 Dose:  0.5 mg


Ondansetron HCl (Zofran Injection)  4 mg IVPUSH Q6H PRN


   PRN Reason: NAUSEA


   Last Admin: 09/03/19 23:36 Dose:  4 mg


Pantoprazole Sodium (Protonix Iv)  40 mg IVPUSH DAILY [facility]


   Last Admin: 09/05/19 10:18 Dose:  40 mg


Scopolamine HBr (Transderm-Scop -)  1 patch TD Q72H [facility]


   Last Admin: 09/05/19 00:58 Dose:  1 patch


Vancomycin HCl (Vancomycin (Pre-Docked))  1,000 mg IVPB Q24H [facility]; Protocol


   Last Admin: 09/04/19 16:03 Dose:  1,000 mg











- Objective


Vital Signs: 


 Vital Signs











Temperature  98.0 F   09/05/19 02:00


 


Pulse Rate  118 H  09/05/19 02:00


 


Respiratory Rate  19 09/05/19 02:00


 


Blood Pressure  115/76   09/05/19 02:00


 


O2 Sat by Pulse Oximetry (%)  100   09/04/19 22:00











Constitutional: Yes: No Distress, Calm


Eyes: Yes: WNL


HENT: Yes: Atraumatic


Neck: Yes: Supple


Cardiovascular: Yes: Tachycardia


Respiratory: Yes: Accessory Muscle Use, On Nasal O2


Gastrointestinal: Yes: Hypoactive Bowel Sounds, Tenderness


...Rectal Exam: Yes: Deferred


Extremities: Yes: Other (contracted extremities.)


Peripheral Pulses WNL: No


Integumentary: Yes: WNL


Neurological: Yes: Lethargy


Labs: 


 CBC, BMP





 09/05/19 07:14 





 09/05/19 07:14 





 INR, PTT











INR  1.00  (0.82-1.09)   08/22/19  17:22    














Problem List





- Problems


(1) SBO (small bowel obstruction)


Assessment/Plan: 


NGT to low intermittent suction, still draining apx 400cc overnight.  


NGT was removed on 8/31/2019 but had to be placed back emergently on 9/2/19 as 

patient had vomiting and worsening pain with respiratory distress and vomiting.

  Once reinserted, immediate drainage of 1L bilious content was noted.  


abd xray with sbo, persists.  surgery notes reviewed and palliative care was 

recommended.  


abdomen continues to be distended, absent bowel sounds.  


monitor drain output daily.  maintain with NGT.


Code(s): K56.609 - UNSP INTESTNL OBST, UNSP AS TO PARTIAL VERSUS COMPLETE OBST 

  





(2) Sepsis


Assessment/Plan: 


on zosyn, vanco and flagyl per ID.  


id following


Code(s): A41.9 - SEPSIS, UNSPECIFIED ORGANISM   





(3) Aspiration into airway


Assessment/Plan: 


chest xray shows left lower lobe infiltrate.  on zosyn per ID.  


support with 2 liters of nasal cannula to ease work of breathing. 


monitor oxygen levels and titrate off oxygen as tolerated.  not home oxygen 

dependent. 


on allbuterol prn for acute shortness of breath


maintain oxygen levels above 92% is goal


Code(s): T17.908A - UNSP FB IN RESP TRACT, PART UNSP CAUSING OTH INJURY, INIT   





(4) Iron deficiency anemia


Assessment/Plan: 


for 1 unit of prbc today as hmg/hct continues to down trend


repeat labs post blood in a.m. to assess need for additional blood 

transfusions. 


Code(s): D50.9 - IRON DEFICIENCY ANEMIA, UNSPECIFIED   





(5) Decreased oral intake


Assessment/Plan: 


NPO.  on clinimax, lipids every other day


dietary following   


Code(s): R63.8 - OTHER SYMPTOMS AND SIGNS CONCERNING FOOD AND FLUID INTAKE   





(6) Seizure


Assessment/Plan: 


on keppra BID.


on tegratol in NH, continue keppra iv bid.  once no longer npo, can restart 

tegretol. 





Code(s): R56.9 - UNSPECIFIED CONVULSIONS   





(7) Hypernatremia


Assessment/Plan: 


renal consulted for electrolyte imbalance.  recommendations appreciated. 


Code(s): E87.0 - HYPEROSMOLALITY AND HYPERNATREMIA   





(8) Hyperkalemia


Assessment/Plan: 


resolved.  monitor K daily, K 3.4 today.   


Code(s): E87.5 - HYPERKALEMIA   





(9) Profound mental handicap


Assessment/Plan: 


supportive care.





Code(s): F73 - PROFOUND INTELLECTUAL DISABILITIES   





(10) Tachycardia


Assessment/Plan: 


tachycardia likely in the setting of abdominal pain, anxiety, NGT- improving. 


 


Code(s): R00.0 - TACHYCARDIA, UNSPECIFIED   





(11) Left pulmonary infiltrate on CXR


Assessment/Plan: 


apply oxygen 2 liters.  patient noted to be congested and breathing rate 24s


on zosyn


 


Code(s): R91.8 - OTHER NONSPECIFIC ABNORMAL FINDING OF LUNG FIELD   





(12) Low serum phosphorus for age


Assessment/Plan: 


repleted with iv phos.  phos 1.0


repeat in a.m 


Code(s): R79.0 - ABNORMAL LEVEL OF BLOOD MINERAL   





(13) Palliative care status


Assessment/Plan: 


seen by palliative care, notes reviewed.  





Code(s): Z51.5 - ENCOUNTER FOR PALLIATIVE CARE   





(14) Prophylactic measure


Assessment/Plan: 


fen


npo


on clinimax and lipids


monitor electrolytes





full code





Code(s): Z29.9 - ENCOUNTER FOR PROPHYLACTIC MEASURES, UNSPECIFIED   





Visit type





- Emergency Visit


Emergency Visit: Yes


ED Registration Date: 08/22/19


Care time: The patient presented to the Emergency Department on the above date 

and was hospitalized for further evaluation of their emergent condition.





- New Patient


This patient is new to me today: No





- Critical Care


Critical Care patient: No





- Discharge Referral


Referred to [facility] No

## 2019-09-05 NOTE — PN
Progress Note (short form)





- Note


Progress Note: 


S: nonverbal


 Current Medications











Generic Name Dose Route Start Last Admin





  Trade Name Freq  PRN Reason Stop Dose Admin


 


Acetaminophen  500 mg  09/01/19 18:53  09/04/19 18:20





  Ofirmev Injection -  IVPB   500 mg





  Q6H PRN   Administration





  PAIN OR FEVER   





     





     





     


 


Albuterol/Ipratropium  1 amp  09/02/19 10:32  09/05/19 06:00





  Duoneb -  NEB   1 amp





  Q4H PRN   Administration





  SHORTNESS OF BREATH   





     





     





     


 


Metronidazole  500 mg in 100 mls @ 100 mls/hr  09/01/19 19:30  09/05/19 03:00





  Flagyl 500mg Premixed Ivpb -  IVPB   100 mls/hr





  Q8H [facility]   Administration





     





     





     





     


 


Piperacillin Sod/Tazobactam  50 mls @ 100 mls/hr  09/01/19 19:30  09/05/19 03:00





  Sod 3.375 gm/ Dextrose  IVPB   100 mls/hr





  Q8H [facility]   Administration





     





     





  Protocol   





     


 


Potassium Chloride 20 meq/  1,010 mls @ 60 mls/hr  09/01/19 20:00  09/04/19 22:

19





  Amino Acids  IVPB   Not Given





  Q12H [facility]   





     





     





     





     


 


Potassium Chloride/Sodium Chloride  20 meq in 1,000 mls @ 60 mls/hr  09/02/19 23

:58  09/05/19 00:52





  Ns+20 Meq Kcl -  IV   Not Given





  ASDIR [facility]   





     





     





     





     


 


Fat Emulsion Intravenous  250 mls @ 20.833 mls/hr  09/05/19 22:00  





  Intralipid -  IV   





  Q2D@2200 [facility]   





     





     





     





     


 


Ketorolac Tromethamine  15 mg  09/04/19 15:24  09/05/19 03:00





  Toradol Injection -  IVPUSH  09/09/19 15:23  15 mg





  Q6H PRN   Administration





  PAIN LEVEL 4 - 6   





     





     





     


 


Levetiracetam  500 mg  09/01/19 22:00  09/05/19 10:18





  Keppra Injection -  IVPB   500 mg





  BID ROSE MARIE   Administration





     





     





     





     


 


Magnesium Sulfate  1 gm  09/05/19 11:03  





  Magnesium Sulfate  IVPB  09/05/19 11:04  





  ONCE ONE   





     





     





     





     


 


Metoclopramide HCl  10 mg  09/01/19 18:55  09/04/19 03:22





  Reglan Injection -  IVPUSH   10 mg





  Q6H PRN   Administration





  NAUSEA AND/OR VOMITING   





     





     





     


 


Morphine Sulfate  0.5 mg  09/04/19 12:06  09/05/19 10:32





  Morphine Sulfate  IVPUSH   0.5 mg





  Q3H PRN   Administration





  PAIN LEVEL 6-10   





     





     





     


 


Ondansetron HCl  4 mg  09/01/19 18:55  09/03/19 23:36





  Zofran Injection  IVPUSH   4 mg





  Q6H PRN   Administration





  NAUSEA   





     





     





     


 


Pantoprazole Sodium  40 mg  09/05/19 10:00  09/05/19 10:18





  Protonix Iv  IVPUSH   40 mg





  DAILY ROSE [facility]IE   Administration





     





     





     





     


 


Scopolamine HBr  1 patch  09/04/19 23:00  09/05/19 00:58





  Transderm-Scop -  TD   1 patch





  Q72H RO[facility]   Administration





     





     





     





     


 


Vancomycin HCl  1,000 mg  09/03/19 15:00  09/04/19 16:03





  Vancomycin (Pre-Docked)  IVPB   1,000 mg





  Q24H RO[facility]   Administration





     





     





  Protocol   





     








 Vital Signs











 Period  Temp  Pulse  Resp  BP Sys/Carlson  Pulse Ox


 


 Last 24 Hr  98.0 F-99.9 F  117-118  18-19  115-137/69-76  100-100











Gen: NAD


Cardiovascular: Yes: Regular Rate and Rhythm


Respiratory: Yes: Rhonchi


Gastrointestinal: Yes: Soft


Edema: No


no jaundice diaphoresis


not agitated





- ....Imaging


EKG: Image Reviewed





  


 CBC, BMP





 09/05/19 07:14 





 09/05/19 07:14 














Assessment/Plan











tele: sinus tachycardia





echo 8/2019  nl LV function, E/A reversal, RV not well visualized, mild MR





IMP:


Sinus tachycardia:


- sinus, monitoring on tele


- echo unremarkable


- likely in setting of underlying pain, anxiety, SBO


- trop neg, no ischemia on EKG - unlikely ACS





SBO:


- manage per surgery, ngt remains in





Seizure disorder:


- manage per primary

## 2019-09-06 LAB
ALBUMIN SERPL-MCNC: 1.6 G/DL (ref 3.4–5)
ALP SERPL-CCNC: 143 U/L (ref 45–117)
ALT SERPL-CCNC: 6 U/L (ref 13–61)
ANION GAP SERPL CALC-SCNC: 5 MMOL/L (ref 8–16)
ANISOCYTOSIS BLD QL: 0
AST SERPL-CCNC: 14 U/L (ref 15–37)
BASOPHILS # BLD: 0.3 % (ref 0–2)
BILIRUB SERPL-MCNC: 0.6 MG/DL (ref 0.2–1)
BUN SERPL-MCNC: 8.2 MG/DL (ref 7–18)
CALCIUM SERPL-MCNC: 7.7 MG/DL (ref 8.5–10.1)
CHLORIDE SERPL-SCNC: 107 MMOL/L (ref 98–107)
CO2 SERPL-SCNC: 27 MMOL/L (ref 21–32)
CREAT SERPL-MCNC: < 0.2 MG/DL (ref 0.55–1.3)
DEPRECATED RDW RBC AUTO: 15.6 % (ref 11.6–15.6)
EOSINOPHIL # BLD: 0 % (ref 0–4.5)
GLUCOSE SERPL-MCNC: 122 MG/DL (ref 74–106)
HCT VFR BLD CALC: 29.8 % (ref 32.4–45.2)
HGB BLD-MCNC: 9.9 GM/DL (ref 10.7–15.3)
LYMPHOCYTES # BLD: 3.2 % (ref 8–40)
MACROCYTES BLD QL: 0
MAGNESIUM SERPL-MCNC: 2.7 MG/DL (ref 1.8–2.4)
MCH RBC QN AUTO: 27.9 PG (ref 25.7–33.7)
MCHC RBC AUTO-ENTMCNC: 33.3 G/DL (ref 32–36)
MCV RBC: 83.7 FL (ref 80–96)
MONOCYTES # BLD AUTO: 3.2 % (ref 3.8–10.2)
NEUTROPHILS # BLD: 93.3 % (ref 42.8–82.8)
PHOSPHATE SERPL-MCNC: 0.7 MG/DL (ref 2.5–4.9)
PLATELET # BLD AUTO: 196 K/MM3 (ref 134–434)
PLATELET BLD QL SMEAR: NORMAL
PMV BLD: 8.9 FL (ref 7.5–11.1)
POTASSIUM SERPLBLD-SCNC: 4 MMOL/L (ref 3.5–5.1)
PROT SERPL-MCNC: 4.6 G/DL (ref 6.4–8.2)
RBC # BLD AUTO: 3.56 M/MM3 (ref 3.6–5.2)
SODIUM SERPL-SCNC: 139 MMOL/L (ref 136–145)
WBC # BLD AUTO: 17.8 K/MM3 (ref 4–10)

## 2019-09-06 RX ADMIN — PIPERACILLIN SODIUM,TAZOBACTAM SODIUM SCH MLS/HR: 3; .375 INJECTION, POWDER, FOR SOLUTION INTRAVENOUS at 20:01

## 2019-09-06 RX ADMIN — IPRATROPIUM BROMIDE AND ALBUTEROL SULFATE PRN AMP: .5; 3 SOLUTION RESPIRATORY (INHALATION) at 16:11

## 2019-09-06 RX ADMIN — MORPHINE SULFATE PRN MG: 2 INJECTION, SOLUTION INTRAMUSCULAR; INTRAVENOUS at 06:49

## 2019-09-06 RX ADMIN — POTASSIUM CHLORIDE AND SODIUM CHLORIDE SCH MLS/HR: 900; 150 INJECTION, SOLUTION INTRAVENOUS at 01:10

## 2019-09-06 RX ADMIN — MORPHINE SULFATE PRN MG: 2 INJECTION, SOLUTION INTRAMUSCULAR; INTRAVENOUS at 10:09

## 2019-09-06 RX ADMIN — KETOROLAC TROMETHAMINE PRN MG: 30 INJECTION INTRAMUSCULAR; INTRAVENOUS at 23:40

## 2019-09-06 RX ADMIN — PIPERACILLIN SODIUM,TAZOBACTAM SODIUM SCH MLS/HR: 3; .375 INJECTION, POWDER, FOR SOLUTION INTRAVENOUS at 03:40

## 2019-09-06 RX ADMIN — I.V. FAT EMULSION SCH MLS/HR: 20 EMULSION INTRAVENOUS at 01:03

## 2019-09-06 RX ADMIN — MORPHINE SULFATE PRN MG: 2 INJECTION, SOLUTION INTRAMUSCULAR; INTRAVENOUS at 17:56

## 2019-09-06 RX ADMIN — KETOROLAC TROMETHAMINE PRN MG: 30 INJECTION INTRAMUSCULAR; INTRAVENOUS at 10:49

## 2019-09-06 RX ADMIN — IPRATROPIUM BROMIDE AND ALBUTEROL SULFATE PRN AMP: .5; 3 SOLUTION RESPIRATORY (INHALATION) at 07:49

## 2019-09-06 RX ADMIN — LEVETIRACETAM SCH MG: 100 INJECTION, SOLUTION, CONCENTRATE INTRAVENOUS at 10:12

## 2019-09-06 RX ADMIN — IPRATROPIUM BROMIDE AND ALBUTEROL SULFATE PRN AMP: .5; 3 SOLUTION RESPIRATORY (INHALATION) at 21:00

## 2019-09-06 RX ADMIN — POTASSIUM CHLORIDE SCH MLS/HR: 149 INJECTION, SOLUTION, CONCENTRATE INTRAVENOUS at 10:45

## 2019-09-06 RX ADMIN — PANTOPRAZOLE SODIUM SCH MG: 40 INJECTION, POWDER, FOR SOLUTION INTRAVENOUS at 10:09

## 2019-09-06 RX ADMIN — LEVETIRACETAM SCH MG: 100 INJECTION, SOLUTION, CONCENTRATE INTRAVENOUS at 01:04

## 2019-09-06 RX ADMIN — PIPERACILLIN SODIUM,TAZOBACTAM SODIUM SCH MLS/HR: 3; .375 INJECTION, POWDER, FOR SOLUTION INTRAVENOUS at 10:53

## 2019-09-06 RX ADMIN — IPRATROPIUM BROMIDE AND ALBUTEROL SULFATE PRN AMP: .5; 3 SOLUTION RESPIRATORY (INHALATION) at 04:00

## 2019-09-06 RX ADMIN — MORPHINE SULFATE PRN MG: 2 INJECTION, SOLUTION INTRAMUSCULAR; INTRAVENOUS at 03:43

## 2019-09-06 RX ADMIN — KETOROLAC TROMETHAMINE PRN MG: 30 INJECTION INTRAMUSCULAR; INTRAVENOUS at 04:42

## 2019-09-06 RX ADMIN — LEVETIRACETAM SCH MG: 100 INJECTION, SOLUTION, CONCENTRATE INTRAVENOUS at 22:47

## 2019-09-06 RX ADMIN — IPRATROPIUM BROMIDE AND ALBUTEROL SULFATE PRN AMP: .5; 3 SOLUTION RESPIRATORY (INHALATION) at 11:14

## 2019-09-06 RX ADMIN — MORPHINE SULFATE PRN MG: 2 INJECTION, SOLUTION INTRAMUSCULAR; INTRAVENOUS at 22:43

## 2019-09-06 RX ADMIN — KETOROLAC TROMETHAMINE PRN MG: 30 INJECTION INTRAMUSCULAR; INTRAVENOUS at 16:52

## 2019-09-06 NOTE — PN
Progress Note, Physician





- Current Medication List


Current Medications: 


Active Medications





Acetaminophen (Ofirmev Injection -)  500 mg IVPB Q6H PRN


   PRN Reason: PAIN OR FEVER


   Last Admin: 09/04/19 18:20 Dose:  500 mg


Albuterol/Ipratropium (Duoneb -)  1 amp NEB Q4H PRN


   PRN Reason: SHORTNESS OF BREATH


   Last Admin: 09/06/19 11:14 Dose:  1 amp


Metronidazole (Flagyl 500mg Premixed Ivpb -)  500 mg in 100 mls @ 100 mls/hr 

IVPB Q8H Pending sale to [facility]


   Last Admin: 09/06/19 11:23 Dose:  100 mls/hr


Piperacillin Sod/Tazobactam (Sod 3.375 gm/ Dextrose)  50 mls @ 100 mls/hr IVPB 

Q8H Pending sale to [facility]; Protocol


   Last Admin: 09/06/19 10:53 Dose:  100 mls/hr


Potassium Chloride 20 meq/ (Amino Acids)  1,010 mls @ 60 mls/hr IVPB Q12H Pending sale to [facility]


   Last Admin: 09/06/19 10:45 Dose:  60 mls/hr


Potassium Chloride/Sodium Chloride (Ns+20 Meq Kcl -)  20 meq in 1,000 mls @ 60 

mls/hr IV ASDIR [facility]


   Last Admin: 09/06/19 01:10 Dose:  60 mls/hr


Fat Emulsion Intravenous (Intralipid -)  250 mls @ 20.833 mls/hr IV Q2D@2200 [facility]


   Last Admin: 09/06/19 01:03 Dose:  20.833 mls/hr


Sodium Phosphate 45 mm/ (Dextrose)  515 mls @ 64.37 mls/hr IVPB ONCE ONE


   Stop: 09/06/19 19:30


Ketorolac Tromethamine (Toradol Injection -)  15 mg IVPUSH Q6H PRN


   PRN Reason: PAIN LEVEL 4 - 6


   Stop: 09/09/19 15:23


   Last Admin: 09/06/19 10:49 Dose:  15 mg


Levetiracetam (Keppra Injection -)  500 mg IVPB BID Pending sale to [facility]


   Last Admin: 09/06/19 10:12 Dose:  500 mg


Metoclopramide HCl (Reglan Injection -)  10 mg IVPUSH Q6H PRN


   PRN Reason: NAUSEA AND/OR VOMITING


   Last Admin: 09/04/19 03:22 Dose:  10 mg


Morphine Sulfate (Morphine Sulfate)  0.5 mg IVPUSH Q3H PRN


   PRN Reason: PAIN LEVEL 6-10


   Last Admin: 09/06/19 10:09 Dose:  0.5 mg


Ondansetron HCl (Zofran Injection)  4 mg IVPUSH Q6H PRN


   PRN Reason: NAUSEA


   Last Admin: 09/03/19 23:36 Dose:  4 mg


Pantoprazole Sodium (Protonix Iv)  40 mg IVPUSH DAILY [facility]


   Last Admin: 09/06/19 10:09 Dose:  40 mg


Scopolamine HBr (Transderm-Scop -)  1 patch TD Q72H [facility]


   Last Admin: 09/05/19 00:58 Dose:  1 patch


Vancomycin HCl (Vancomycin (Pre-Docked))  1,000 mg IVPB Q24H [facility]; Protocol


   Last Admin: 09/05/19 15:45 Dose:  1,000 mg











- Objective


Vital Signs: 


 Vital Signs











Temperature  99.6 F   09/06/19 05:15


 


Pulse Rate  118 H  09/06/19 05:15


 


Respiratory Rate  22 H  09/06/19 05:15


 


Blood Pressure  122/78   09/06/19 05:15


 


O2 Sat by Pulse Oximetry (%)  100   09/05/19 22:00











Labs: 


 CBC, BMP





 09/06/19 07:08 





 09/06/19 06:00 





 INR, PTT











INR  1.00  (0.82-1.09)   08/22/19  17:22

## 2019-09-06 NOTE — PN
Progress Note, Physician


Chief Complaint: 





no sig clinical change from CV perspective


History of Present Illness: 





TELE: ST





- Current Medication List


Current Medications: 


Active Medications





Acetaminophen (Ofirmev Injection -)  500 mg IVPB Q6H PRN


   PRN Reason: PAIN OR FEVER


   Last Admin: 09/04/19 18:20 Dose:  500 mg


Albuterol/Ipratropium (Duoneb -)  1 amp NEB Q4H PRN


   PRN Reason: SHORTNESS OF BREATH


   Last Admin: 09/06/19 07:49 Dose:  1 amp


Metronidazole (Flagyl 500mg Premixed Ivpb -)  500 mg in 100 mls @ 100 mls/hr 

IVPB Q8H [facility]


   Last Admin: 09/06/19 03:40 Dose:  100 mls/hr


Piperacillin Sod/Tazobactam (Sod 3.375 gm/ Dextrose)  50 mls @ 100 mls/hr IVPB 

Q8H [facility]; Protocol


   Last Admin: 09/06/19 03:40 Dose:  100 mls/hr


Potassium Chloride 20 meq/ (Amino Acids)  1,010 mls @ 60 mls/hr IVPB Q12H [facility]


   Last Admin: 09/05/19 19:59 Dose:  Not Given


Potassium Chloride/Sodium Chloride (Ns+20 Meq Kcl -)  20 meq in 1,000 mls @ 60 

mls/hr IV ASDIR [facility]


   Last Admin: 09/06/19 01:10 Dose:  60 mls/hr


Fat Emulsion Intravenous (Intralipid -)  250 mls @ 20.833 mls/hr IV Q2D@2200 [facility]


   Last Admin: 09/06/19 01:03 Dose:  20.833 mls/hr


Ketorolac Tromethamine (Toradol Injection -)  15 mg IVPUSH Q6H PRN


   PRN Reason: PAIN LEVEL 4 - 6


   Stop: 09/09/19 15:23


   Last Admin: 09/06/19 04:42 Dose:  15 mg


Levetiracetam (Keppra Injection -)  500 mg IVPB BID [facility]


   Last Admin: 09/06/19 01:04 Dose:  500 mg


Metoclopramide HCl (Reglan Injection -)  10 mg IVPUSH Q6H PRN


   PRN Reason: NAUSEA AND/OR VOMITING


   Last Admin: 09/04/19 03:22 Dose:  10 mg


Morphine Sulfate (Morphine Sulfate)  0.5 mg IVPUSH Q3H PRN


   PRN Reason: PAIN LEVEL 6-10


   Last Admin: 09/06/19 06:49 Dose:  0.5 mg


Ondansetron HCl (Zofran Injection)  4 mg IVPUSH Q6H PRN


   PRN Reason: NAUSEA


   Last Admin: 09/03/19 23:36 Dose:  4 mg


Pantoprazole Sodium (Protonix Iv)  40 mg IVPUSH DAILY [facility]


   Last Admin: 09/05/19 10:18 Dose:  40 mg


Scopolamine HBr (Transderm-Scop -)  1 patch TD Q72H [facility]


   Last Admin: 09/05/19 00:58 Dose:  1 patch


Vancomycin HCl (Vancomycin (Pre-Docked))  1,000 mg IVPB Q24H [facility]; Protocol


   Last Admin: 09/05/19 15:45 Dose:  1,000 mg











- Objective


Vital Signs: 


 Vital Signs











Temperature  99.6 F   09/06/19 05:15


 


Pulse Rate  118 H  09/06/19 05:15


 


Respiratory Rate  22 H  09/06/19 05:15


 


Blood Pressure  122/78   09/06/19 05:15


 


O2 Sat by Pulse Oximetry (%)  100   09/05/19 22:00











Cardiovascular: Yes: Tachycardia


Respiratory: Yes: Other (scattered rhonchi)


Edema: No


Labs: 


 CBC, BMP





 09/06/19 07:08 





 09/06/19 06:00 





 INR, PTT











INR  1.00  (0.82-1.09)   08/22/19  17:22    














Assessment/Plan





IMP:


Sinus tachycardia:


- sinus, monitoring on tele


- echo unremarkable


- likely in setting of underlying pain, anxiety, SBO


- trop neg, no ischemia on EKG - unlikely ACS





SBO:


- manage per surgery, ngt remains in





Seizure disorder:


- manage per primary

## 2019-09-06 NOTE — PN
Progress Note, Physician


History of Present Illness: 





Pt seen and examined at bedside. Family at bedside. Pt appears comfortable. 





- Current Medication List


Current Medications: 


Active Medications





Acetaminophen (Ofirmev Injection -)  500 mg IVPB Q6H PRN


   PRN Reason: PAIN OR FEVER


   Last Admin: 09/04/19 18:20 Dose:  500 mg


Albuterol/Ipratropium (Duoneb -)  1 amp NEB Q4H PRN


   PRN Reason: SHORTNESS OF BREATH


   Last Admin: 09/06/19 11:14 Dose:  1 amp


Metronidazole (Flagyl 500mg Premixed Ivpb -)  500 mg in 100 mls @ 100 mls/hr 

IVPB Q8H [facility]


   Last Admin: 09/06/19 11:23 Dose:  100 mls/hr


Piperacillin Sod/Tazobactam (Sod 3.375 gm/ Dextrose)  50 mls @ 100 mls/hr IVPB 

Q8H [facility]; Protocol


   Last Admin: 09/06/19 10:53 Dose:  100 mls/hr


Potassium Chloride 20 meq/ (Amino Acids)  1,010 mls @ 60 mls/hr IVPB Q12H [facility]


   Last Admin: 09/06/19 10:45 Dose:  60 mls/hr


Fat Emulsion Intravenous (Intralipid -)  250 mls @ 20.833 mls/hr IV Q2D@2200 [facility]


   Last Admin: 09/06/19 01:03 Dose:  20.833 mls/hr


Sodium Phosphate 45 mm/ (Dextrose)  515 mls @ 64.37 mls/hr IVPB ONCE ONE


   Stop: 09/06/19 19:30


Ketorolac Tromethamine (Toradol Injection -)  15 mg IVPUSH Q6H PRN


   PRN Reason: PAIN LEVEL 4 - 6


   Stop: 09/09/19 15:23


   Last Admin: 09/06/19 10:49 Dose:  15 mg


Levetiracetam (Keppra Injection -)  500 mg IVPB BID [facility]


   Last Admin: 09/06/19 10:12 Dose:  500 mg


Metoclopramide HCl (Reglan Injection -)  10 mg IVPUSH Q6H PRN


   PRN Reason: NAUSEA AND/OR VOMITING


   Last Admin: 09/04/19 03:22 Dose:  10 mg


Morphine Sulfate (Morphine Sulfate)  1 mg IVPUSH Q3H PRN


   PRN Reason: PAIN LEVEL 6-10


Ondansetron HCl (Zofran Injection)  4 mg IVPUSH Q6H PRN


   PRN Reason: NAUSEA


   Last Admin: 09/03/19 23:36 Dose:  4 mg


Pantoprazole Sodium (Protonix Iv)  40 mg IVPUSH DAILY [facility]


   Last Admin: 09/06/19 10:09 Dose:  40 mg


Scopolamine HBr (Transderm-Scop -)  1 patch TD Q72H [facility]


   Last Admin: 09/05/19 00:58 Dose:  1 patch











- Objective


Vital Signs: 


 Vital Signs











Temperature  99.6 F   09/06/19 05:15


 


Pulse Rate  118 H  09/06/19 05:15


 


Respiratory Rate  22 H  09/06/19 05:15


 


Blood Pressure  122/78   09/06/19 05:15


 


O2 Sat by Pulse Oximetry (%)  100   09/05/19 22:00











Constitutional: Yes: Calm


Eyes: Yes: Conjunctiva Clear


Cardiovascular: Yes: S1, S2


Respiratory: Yes: CTA Bilaterally


Gastrointestinal: Yes: Other (ng tube)


Genitourinary: Yes: Incontinence


Musculoskeletal: Yes: Muscle Weakness


Edema: No


Neurological: Yes: Pre-Existing Deficit


Labs: 


 CBC, BMP





 09/06/19 07:08 





 09/06/19 06:00 





 INR, PTT











INR  1.00  (0.82-1.09)   08/22/19  17:22    














Problem List





- Problems


(1) Hyperkalemia


Code(s): E87.5 - HYPERKALEMIA   





(2) Hypernatremia


Code(s): E87.0 - HYPEROSMOLALITY AND HYPERNATREMIA   





(3) SBO (small bowel obstruction)


Code(s): K56.609 - UNSP INTESTNL OBST, UNSP AS TO PARTIAL VERSUS COMPLETE OBST 

  





(4) Seizure


Code(s): R56.9 - UNSPECIFIED CONVULSIONS   





Assessment/Plan


 Current Medications











Generic Name Dose Route Start Last Admin





  Trade Name Freq  PRN Reason Stop Dose Admin


 


Acetaminophen  500 mg  09/01/19 18:53  09/04/19 18:20





  Ofirmev Injection -  IVPB   500 mg





  Q6H PRN   Administration





  PAIN OR FEVER   





     





     





     


 


Albuterol/Ipratropium  1 amp  09/02/19 10:32  09/06/19 11:14





  Duoneb -  NEB   1 amp





  Q4H PRN   Administration





  SHORTNESS OF BREATH   





     





     





     


 


Metronidazole  500 mg in 100 mls @ 100 mls/hr  09/01/19 19:30  09/06/19 11:23





  Flagyl 500mg Premixed Ivpb -  IVPB   100 mls/hr





  Q8H [facility]   Administration





     





     





     





     


 


Piperacillin Sod/Tazobactam  50 mls @ 100 mls/hr  09/01/19 19:30  09/06/19 10:53





  Sod 3.375 gm/ Dextrose  IVPB   100 mls/hr





  Q8H [facility]   Administration





     





     





  Protocol   





     


 


Potassium Chloride 20 meq/  1,010 mls @ 60 mls/hr  09/01/19 20:00  09/06/19 10:

45





  Amino Acids  IVPB   60 mls/hr





  Q12H [facility]   Administration





     





     





     





     


 


Fat Emulsion Intravenous  250 mls @ 20.833 mls/hr  09/05/19 22:00  09/06/19 01:

03





  Intralipid -  IV   20.833 mls/hr





  Q2D@2200 [facility]   Administration





     





     





     





     


 


Sodium Phosphate 45 mm/  515 mls @ 64.37 mls/hr  09/06/19 11:30  





  Dextrose  IVPB  09/06/19 19:30  





  ONCE ONE   





     





     





     





  45 MM/8 HR   


 


Ketorolac Tromethamine  15 mg  09/04/19 15:24  09/06/19 10:49





  Toradol Injection -  IVPUSH  09/09/19 15:23  15 mg





  Q6H PRN   Administration





  PAIN LEVEL 4 - 6   





     





     





     


 


Levetiracetam  500 mg  09/01/19 22:00  09/06/19 10:12





  Keppra Injection -  IVPB   500 mg





  BID [facility]   Administration





     





     





     





     


 


Metoclopramide HCl  10 mg  09/01/19 18:55  09/04/19 03:22





  Reglan Injection -  IVPUSH   10 mg





  Q6H PRN   Administration





  NAUSEA AND/OR VOMITING   





     





     





     


 


Morphine Sulfate  1 mg  09/06/19 12:35  





  Morphine Sulfate  IVPUSH   





  Q3H PRN   





  PAIN LEVEL 6-10   





     





     





     


 


Ondansetron HCl  4 mg  09/01/19 18:55  09/03/19 23:36





  Zofran Injection  IVPUSH   4 mg





  Q6H PRN   Administration





  NAUSEA   





     





     





     


 


Pantoprazole Sodium  40 mg  09/05/19 10:00  09/06/19 10:09





  Protonix Iv  IVPUSH   40 mg





  DAILY [facility]   Administration





     





     





     





     


 


Scopolamine HBr  1 patch  09/04/19 23:00  09/05/19 00:58





  Transderm-Scop -  TD   1 patch





  Q72H [facility]   Administration





     





     





     





     














Impression


1. hypernatremia


2. hyperkalemia - resolved


3. SBO


4. epilepsy


5. developemental delay


6. hypokalemia





Plan


- replace phos


- cont fluids


- surgery follow up


- family discussing GOC


- will follow PRN

## 2019-09-06 NOTE — PN
Progress Note, Physician


Chief Complaint: 





appears more comfortable today, less facial grimacing, had some pain overnight


s/p 1 unit of prbc with appropriate response.


family in room, and POC discussed with them, they are in agreement





Spoke to Dr. Shailesh Rodriguez ([facility] MD) on 9/5/2019.  He is currently 

covering for Dr Segal.  Spoke to him in presence of  Shelly regarding Mrs Wearing hospitalization, surgery notes as well as 

current status.  made him aware that we will need legal paperwork if patient 

was to be made a dnr/dni or if consents need to be sign for this patient.  Dr. Shailesh Rodriguez informed me that he will be reaching out to the  and will ask 

them to either call me or social work.


History of Present Illness: 





Patient is a 59 year old female (resident of [facility]) with a PMHx 

significant for profound mental retardation, cerebral palsy, congenital 

quadriplegia, severe scoliosis, seizure disorder, and GERD.  Transferred from 

[place] to [facility] for further management of SBO, sepsis and tachycardia.  





critically low phos level reported, potassium phos ordered again today.


for 1 unit of blood 


palliative care notes reviewed and noted that if patient is to be made a dnr, 

there are forms that need to be filled out and special criteria listed.  There 

are 9 siblings as well as MHLS that would have to be involved.  patient is a 

Full Code.








- Current Medication List


Current Medications: 


Active Medications





Acetaminophen (Ofirmev Injection -)  500 mg IVPB Q6H PRN


   PRN Reason: PAIN OR FEVER


   Last Admin: 09/04/19 18:20 Dose:  500 mg


Albuterol/Ipratropium (Duoneb -)  1 amp NEB Q4H PRN


   PRN Reason: SHORTNESS OF BREATH


   Last Admin: 09/06/19 07:49 Dose:  1 amp


Metronidazole (Flagyl 500mg Premixed Ivpb -)  500 mg in 100 mls @ 100 mls/hr 

IVPB Q8H [facility]


   Last Admin: 09/06/19 03:40 Dose:  100 mls/hr


Piperacillin Sod/Tazobactam (Sod 3.375 gm/ Dextrose)  50 mls @ 100 mls/hr IVPB 

Q8H [facility]; Protocol


   Last Admin: 09/06/19 03:40 Dose:  100 mls/hr


Potassium Chloride 20 meq/ (Amino Acids)  1,010 mls @ 60 mls/hr IVPB Q12H [facility]


   Last Admin: 09/05/19 19:59 Dose:  Not Given


Potassium Chloride/Sodium Chloride (Ns+20 Meq Kcl -)  20 meq in 1,000 mls @ 60 

mls/hr IV ASDIR [facility]


   Last Admin: 09/06/19 01:10 Dose:  60 mls/hr


Fat Emulsion Intravenous (Intralipid -)  250 mls @ 20.833 mls/hr IV Q2D@2200 [facility]


   Last Admin: 09/06/19 01:03 Dose:  20.833 mls/hr


Ketorolac Tromethamine (Toradol Injection -)  15 mg IVPUSH Q6H PRN


   PRN Reason: PAIN LEVEL 4 - 6


   Stop: 09/09/19 15:23


   Last Admin: 09/06/19 04:42 Dose:  15 mg


Levetiracetam (Keppra Injection -)  500 mg IVPB BID [facility]


   Last Admin: 09/06/19 01:04 Dose:  500 mg


Metoclopramide HCl (Reglan Injection -)  10 mg IVPUSH Q6H PRN


   PRN Reason: NAUSEA AND/OR VOMITING


   Last Admin: 09/04/19 03:22 Dose:  10 mg


Morphine Sulfate (Morphine Sulfate)  0.5 mg IVPUSH Q3H PRN


   PRN Reason: PAIN LEVEL 6-10


   Last Admin: 09/06/19 06:49 Dose:  0.5 mg


Ondansetron HCl (Zofran Injection)  4 mg IVPUSH Q6H PRN


   PRN Reason: NAUSEA


   Last Admin: 09/03/19 23:36 Dose:  4 mg


Pantoprazole Sodium (Protonix Iv)  40 mg IVPUSH DAILY [facility]


   Last Admin: 09/05/19 10:18 Dose:  40 mg


Scopolamine HBr (Transderm-Scop -)  1 patch TD Q72H [facility]


   Last Admin: 09/05/19 00:58 Dose:  1 patch


Vancomycin HCl (Vancomycin (Pre-Docked))  1,000 mg IVPB Q24H [facility]; Protocol


   Last Admin: 09/05/19 15:45 Dose:  1,000 mg











- Objective


Vital Signs: 


 Vital Signs











Temperature  99.6 F   09/06/19 05:15


 


Pulse Rate  118 H  09/06/19 05:15


 


Respiratory Rate  22 H  09/06/19 05:15


 


Blood Pressure  122/78   09/06/19 05:15


 


O2 Sat by Pulse Oximetry (%)  100   09/05/19 22:00











Constitutional: Yes: Calm, Anxious


Eyes: Yes: WNL


HENT: Yes: Atraumatic


Neck: Yes: Supple


Cardiovascular: Yes: Tachycardia


Respiratory: Yes: On Nasal O2, Rales, SOB


Gastrointestinal: Yes: Soft, Hypoactive Bowel Sounds (ngt), Other


Genitourinary: Yes: Bladder Distention


Musculoskeletal: Yes: Other


Extremities: Yes: WNL


Edema: Yes


Edema: LUE: 1+, LLE: 1+, RLE: 1+


Peripheral Pulses WNL: Yes


Integumentary: Yes: WNL


Neurological: Yes: Alert


Psychiatric: Yes: Alert


Labs: 


 CBC, BMP





 09/06/19 07:08 





 09/06/19 06:00 





 INR, PTT











INR  1.00  (0.82-1.09)   08/22/19  17:22    














Problem List





- Problems


(1) SBO (small bowel obstruction)


Assessment/Plan: 


NGT to low intermittent suction, still draining apx 200cc overnight.  

hypoactive bowel sounds.


NGT was removed on 8/31/2019 but had to be placed back emergently on 9/2/19 as 

patient had vomiting and worsening pain with respiratory distress and vomiting.

  Once reinserted, immediate drainage of 1L bilious content was noted.  


abd xray with sbo, persists.  surgery notes reviewed and palliative care was 

recommended.  


abdomen continues to be distended, absent bowel sounds.  


monitor drain output daily.  maintain with NGT.


surgery following


Code(s): K56.609 - UNSP INTESTNL OBST, UNSP AS TO PARTIAL VERSUS COMPLETE OBST 

  





(2) Sepsis


Assessment/Plan: 


on zosyn, vanco and flagyl per ID.  


id following


vanco trough today


Code(s): A41.9 - SEPSIS, UNSPECIFIED ORGANISM   





(3) Aspiration into airway


Assessment/Plan: 


chest xray shows left lower lobe infiltrate.  on zosyn per ID.  


support with 2 liters of nasal cannula to ease work of breathing. 


monitor oxygen levels and titrate off oxygen as tolerated.  not home oxygen 

dependent. 


on allbuterol prn for acute shortness of breath


maintain oxygen levels above 92% is goal


Code(s): T17.908A - UNSP FB IN RESP TRACT, PART UNSP CAUSING OTH INJURY, INIT   





(4) Iron deficiency anemia


Assessment/Plan: 


s/p 1 unit of prbc overnight with appropriate response.   


Code(s): D50.9 - IRON DEFICIENCY ANEMIA, UNSPECIFIED   





(5) Decreased oral intake


Assessment/Plan: 


NPO.  on clinimax, lipids every other day


dietary following   


Code(s): R63.8 - OTHER SYMPTOMS AND SIGNS CONCERNING FOOD AND FLUID INTAKE   





(6) Seizure


Assessment/Plan: 


on keppra BID.


on tegratol in NH, continue keppra iv bid.  once no longer npo, can restart 

tegretol. 





Code(s): R56.9 - UNSPECIFIED CONVULSIONS   





(7) Hypernatremia


Assessment/Plan: 


renal consulted for electrolyte imbalance.  recommendations appreciated. 


Code(s): E87.0 - HYPEROSMOLALITY AND HYPERNATREMIA   





(8) Hyperkalemia


Assessment/Plan: 


resolved.  monitor K daily   


Code(s): E87.5 - HYPERKALEMIA   





(9) Profound mental handicap


Assessment/Plan: 


supportive care.





Code(s): F73 - PROFOUND INTELLECTUAL DISABILITIES   





(10) Tachycardia


Assessment/Plan: 


tachycardia likely in the setting of abdominal pain, anxiety, NGT- improving. 


can d/c tele per cardiology


 


Code(s): R00.0 - TACHYCARDIA, UNSPECIFIED   





(11) Left pulmonary infiltrate on CXR


Assessment/Plan: 


apply oxygen 2 liters.  patient noted to be congested and breathing rate 24s


on zosyn


 


Code(s): R91.8 - OTHER NONSPECIFIC ABNORMAL FINDING OF LUNG FIELD   





(12) Low serum phosphorus for age


Assessment/Plan: 


repeat phos levels. has been getting supplementation


repeat daily


Code(s): R79.0 - ABNORMAL LEVEL OF BLOOD MINERAL   





(13) Palliative care status


Assessment/Plan: 


seen by palliative care, notes reviewed.  





Code(s): Z51.5 - ENCOUNTER FOR PALLIATIVE CARE   





(14) Prophylactic measure


Assessment/Plan: 


fen


npo


on clinimax and lipids


monitor electrolytes





full code





Code(s): Z29.9 - ENCOUNTER FOR PROPHYLACTIC MEASURES, UNSPECIFIED   





Visit type





- Emergency Visit


Emergency Visit: Yes


ED Registration Date: 08/22/19


Care time: The patient presented to the Emergency Department on the above date 

and was hospitalized for further evaluation of their emergent condition.





- New Patient


This patient is new to me today: No





- Critical Care


Critical Care patient: No





- Discharge Referral


Referred to [facility] No

## 2019-09-07 LAB
ALBUMIN SERPL-MCNC: 1.6 G/DL (ref 3.4–5)
ALP SERPL-CCNC: 129 U/L (ref 45–117)
ALT SERPL-CCNC: < 6 U/L (ref 13–61)
ANION GAP SERPL CALC-SCNC: 4 MMOL/L (ref 8–16)
AST SERPL-CCNC: 16 U/L (ref 15–37)
BASOPHILS # BLD: 0.1 % (ref 0–2)
BILIRUB SERPL-MCNC: 0.5 MG/DL (ref 0.2–1)
BUN SERPL-MCNC: 11.2 MG/DL (ref 7–18)
CALCIUM SERPL-MCNC: 8 MG/DL (ref 8.5–10.1)
CHLORIDE SERPL-SCNC: 105 MMOL/L (ref 98–107)
CHOLEST SERPL-MCNC: 77 MG/DL (ref 50–200)
CO2 SERPL-SCNC: 31 MMOL/L (ref 21–32)
CREAT SERPL-MCNC: < 0.2 MG/DL (ref 0.55–1.3)
DEPRECATED RDW RBC AUTO: 16 % (ref 11.6–15.6)
EOSINOPHIL # BLD: 0 % (ref 0–4.5)
GLUCOSE SERPL-MCNC: 98 MG/DL (ref 74–106)
HCT VFR BLD CALC: 29.6 % (ref 32.4–45.2)
HDLC SERPL-MCNC: 40 MG/DL (ref 40–60)
HGB BLD-MCNC: 9.8 GM/DL (ref 10.7–15.3)
LYMPHOCYTES # BLD: 5 % (ref 8–40)
MAGNESIUM SERPL-MCNC: 2 MG/DL (ref 1.8–2.4)
MCH RBC QN AUTO: 27.6 PG (ref 25.7–33.7)
MCHC RBC AUTO-ENTMCNC: 33.2 G/DL (ref 32–36)
MCV RBC: 82.9 FL (ref 80–96)
MONOCYTES # BLD AUTO: 4.8 % (ref 3.8–10.2)
NEUTROPHILS # BLD: 90.1 % (ref 42.8–82.8)
PHOSPHATE SERPL-MCNC: 1.4 MG/DL (ref 2.5–4.9)
PLATELET # BLD AUTO: 198 K/MM3 (ref 134–434)
PMV BLD: 8.9 FL (ref 7.5–11.1)
POTASSIUM SERPLBLD-SCNC: 3.5 MMOL/L (ref 3.5–5.1)
PROT SERPL-MCNC: 4.6 G/DL (ref 6.4–8.2)
RBC # BLD AUTO: 3.57 M/MM3 (ref 3.6–5.2)
SODIUM SERPL-SCNC: 139 MMOL/L (ref 136–145)
TRIGL SERPL-MCNC: 58 MG/DL (ref 0–150)
WBC # BLD AUTO: 12.6 K/MM3 (ref 4–10)

## 2019-09-07 RX ADMIN — IPRATROPIUM BROMIDE AND ALBUTEROL SULFATE PRN AMP: .5; 3 SOLUTION RESPIRATORY (INHALATION) at 06:46

## 2019-09-07 RX ADMIN — SCOPALAMINE SCH PATCH: 1 PATCH, EXTENDED RELEASE TRANSDERMAL at 23:48

## 2019-09-07 RX ADMIN — LORAZEPAM PRN MG: 2 INJECTION INTRAMUSCULAR; INTRAVENOUS at 10:09

## 2019-09-07 RX ADMIN — LEVETIRACETAM SCH MG: 100 INJECTION, SOLUTION, CONCENTRATE INTRAVENOUS at 10:05

## 2019-09-07 RX ADMIN — KETOROLAC TROMETHAMINE PRN MG: 30 INJECTION INTRAMUSCULAR; INTRAVENOUS at 20:40

## 2019-09-07 RX ADMIN — POTASSIUM CHLORIDE SCH MLS/HR: 149 INJECTION, SOLUTION, CONCENTRATE INTRAVENOUS at 23:10

## 2019-09-07 RX ADMIN — PIPERACILLIN SODIUM,TAZOBACTAM SODIUM SCH MLS/HR: 3; .375 INJECTION, POWDER, FOR SOLUTION INTRAVENOUS at 10:49

## 2019-09-07 RX ADMIN — KETOROLAC TROMETHAMINE PRN MG: 30 INJECTION INTRAMUSCULAR; INTRAVENOUS at 15:32

## 2019-09-07 RX ADMIN — PIPERACILLIN SODIUM,TAZOBACTAM SODIUM SCH MLS/HR: 3; .375 INJECTION, POWDER, FOR SOLUTION INTRAVENOUS at 03:23

## 2019-09-07 RX ADMIN — NYSTATIN SCH APPLIC: 100000 POWDER TOPICAL at 10:43

## 2019-09-07 RX ADMIN — IPRATROPIUM BROMIDE AND ALBUTEROL SULFATE PRN AMP: .5; 3 SOLUTION RESPIRATORY (INHALATION) at 21:10

## 2019-09-07 RX ADMIN — POTASSIUM CHLORIDE SCH MLS/HR: 149 INJECTION, SOLUTION, CONCENTRATE INTRAVENOUS at 10:47

## 2019-09-07 RX ADMIN — MORPHINE SULFATE PRN MG: 2 INJECTION, SOLUTION INTRAMUSCULAR; INTRAVENOUS at 12:46

## 2019-09-07 RX ADMIN — MORPHINE SULFATE PRN MG: 2 INJECTION, SOLUTION INTRAMUSCULAR; INTRAVENOUS at 07:29

## 2019-09-07 RX ADMIN — MORPHINE SULFATE PRN MG: 2 INJECTION, SOLUTION INTRAMUSCULAR; INTRAVENOUS at 17:49

## 2019-09-07 RX ADMIN — MORPHINE SULFATE PRN MG: 2 INJECTION, SOLUTION INTRAMUSCULAR; INTRAVENOUS at 03:23

## 2019-09-07 RX ADMIN — IPRATROPIUM BROMIDE AND ALBUTEROL SULFATE PRN AMP: .5; 3 SOLUTION RESPIRATORY (INHALATION) at 01:06

## 2019-09-07 RX ADMIN — KETOROLAC TROMETHAMINE PRN MG: 30 INJECTION INTRAMUSCULAR; INTRAVENOUS at 05:48

## 2019-09-07 RX ADMIN — I.V. FAT EMULSION SCH MLS/HR: 20 EMULSION INTRAVENOUS at 23:14

## 2019-09-07 RX ADMIN — PIPERACILLIN SODIUM,TAZOBACTAM SODIUM SCH MLS/HR: 3; .375 INJECTION, POWDER, FOR SOLUTION INTRAVENOUS at 19:41

## 2019-09-07 RX ADMIN — POTASSIUM CHLORIDE SCH MLS/HR: 149 INJECTION, SOLUTION, CONCENTRATE INTRAVENOUS at 00:10

## 2019-09-07 RX ADMIN — MORPHINE SULFATE PRN MG: 2 INJECTION, SOLUTION INTRAMUSCULAR; INTRAVENOUS at 23:27

## 2019-09-07 RX ADMIN — LORAZEPAM PRN MG: 2 INJECTION INTRAMUSCULAR; INTRAVENOUS at 22:42

## 2019-09-07 RX ADMIN — LEVETIRACETAM SCH MG: 100 INJECTION, SOLUTION, CONCENTRATE INTRAVENOUS at 22:43

## 2019-09-07 RX ADMIN — PANTOPRAZOLE SODIUM SCH MG: 40 INJECTION, POWDER, FOR SOLUTION INTRAVENOUS at 10:08

## 2019-09-07 RX ADMIN — NYSTATIN SCH APPLIC: 100000 POWDER TOPICAL at 23:48

## 2019-09-07 NOTE — PN
Progress Note, Physician


Chief Complaint: 





tachy


History of Present Illness: 





sleeping


not communicative





- Current Medication List


Current Medications: 


Active Medications





Acetaminophen (Ofirmev Injection -)  500 mg IVPB Q6H PRN


   PRN Reason: PAIN OR FEVER


   Last Admin: 09/04/19 18:20 Dose:  500 mg


Albuterol/Ipratropium (Duoneb -)  1 amp NEB Q4H PRN


   PRN Reason: SHORTNESS OF BREATH


   Last Admin: 09/07/19 06:46 Dose:  1 amp


Metronidazole (Flagyl 500mg Premixed Ivpb -)  500 mg in 100 mls @ 100 mls/hr 

IVPB Q8H [facility]


   Last Admin: 09/07/19 11:23 Dose:  100 mls/hr


Piperacillin Sod/Tazobactam (Sod 3.375 gm/ Dextrose)  50 mls @ 100 mls/hr IVPB 

Q8H [facility]; Protocol


   Last Admin: 09/07/19 10:49 Dose:  100 mls/hr


Potassium Chloride 20 meq/ (Amino Acids)  1,010 mls @ 60 mls/hr IVPB Q12H [facility]


   Last Admin: 09/07/19 10:47 Dose:  60 mls/hr


Fat Emulsion Intravenous (Intralipid -)  250 mls @ 20.833 mls/hr IV Q2D@2200 [facility]


   Last Admin: 09/06/19 01:03 Dose:  20.833 mls/hr


Potassium Phosphate 45 mm/ (Dextrose)  515 mls @ 62.5 mls/hr IVPB ONCE ONE


   Stop: 09/07/19 18:26


   Last Admin: 09/07/19 11:20 Dose:  62.5 mls/hr


Ketorolac Tromethamine (Toradol Injection -)  15 mg IVPUSH Q6H PRN


   PRN Reason: PAIN LEVEL 4 - 6


   Stop: 09/09/19 15:23


   Last Admin: 09/07/19 05:48 Dose:  15 mg


Levetiracetam (Keppra Injection -)  500 mg IVPB BID [facility]


   Last Admin: 09/07/19 10:05 Dose:  500 mg


Lorazepam (Ativan Injection -)  0.25 mg IVPUSH Q6H PRN


   PRN Reason: ANXIETY


   Last Admin: 09/07/19 10:09 Dose:  0.25 mg


Metoclopramide HCl (Reglan Injection -)  10 mg IVPUSH Q6H PRN


   PRN Reason: NAUSEA AND/OR VOMITING


   Last Admin: 09/04/19 03:22 Dose:  10 mg


Morphine Sulfate (Morphine Sulfate)  1 mg IVPUSH Q4H PRN


   PRN Reason: PAIN LEVEL 6-10


   Last Admin: 09/07/19 12:46 Dose:  1 mg


Nystatin (Nystop Powder -)  1 applic TP BID [facility]


   Last Admin: 09/07/19 10:43 Dose:  1 applic


Ondansetron HCl (Zofran Injection)  4 mg IVPUSH Q6H PRN


   PRN Reason: NAUSEA


   Last Admin: 09/03/19 23:36 Dose:  4 mg


Pantoprazole Sodium (Protonix Iv)  40 mg IVPUSH DAILY [facility]


   Last Admin: 09/07/19 10:08 Dose:  40 mg


Scopolamine HBr (Transderm-Scop -)  1 patch TD Q72H [facility]


   Last Admin: 09/05/19 00:58 Dose:  1 patch











- Objective


Vital Signs: 


 Vital Signs











Temperature  98.3 F   09/07/19 10:00


 


Pulse Rate  109 H  09/07/19 10:00


 


Respiratory Rate  20   09/07/19 10:00


 


Blood Pressure  163/97   09/07/19 10:00


 


O2 Sat by Pulse Oximetry (%)  100   09/06/19 21:00











Constitutional: Yes: Well Nourished (contracted), No Distress, Calm


Cardiovascular: Yes: Regular Rate and Rhythm, S1, S2.  No: Gallop, Murmur


Respiratory: Yes: Regular, CTA Bilaterally.  No: Accessory Muscle Use


Extremities: No: Cold


Edema: No


Neurological: No: Alert, Oriented


Psychiatric: No: Agitated


Labs: 


 CBC, BMP





 09/07/19 06:55 





 09/07/19 06:55 





 INR, PTT











INR  1.00  (0.82-1.09)   08/22/19  17:22    














Assessment/Plan





IMP:


Sinus tachycardia:


- remains mild-mod tachy, likely sec to ongoing GI process


- no med mgmt indicated


- echo unremarkable


- trop neg, no ischemia on EKG - unlikely ACS





SBO:


- manage per surgery, ngt remains in





Seizure disorder:


- manage per primary

## 2019-09-07 NOTE — PN
Progress Note, Physician


Chief Complaint: 





patient did not sleep all night and was in some pain per family.  also having 

some episodes of restlessness. 


s/p 1 unit of prbc with appropriate response.


family in room, and POC discussed with them, they are in agreement





Spoke to Dr. Shailesh Rodriguez ([facility] MD) on 9/5/2019.  He is currently 

covering for Dr Segal.  Spoke to him in presence of  Shelly regarding Mrs Wearing hospitalization, surgery notes as well as 

current status.  made him aware that we will need legal paperwork if patient 

was to be made a dnr/dni or if consents need to be sign for this patient.  Dr. Shailesh Rodriguez informed me that he will be reaching out to the  and will ask 

them to either call me or social work.


History of Present Illness: 





Patient is a 59 year old female (resident of [facility]) with a PMHx 

significant for profound mental retardation, cerebral palsy, congenital 

quadriplegia, severe scoliosis, seizure disorder, and GERD.  Transferred from 

[place] to [facility] for further management of SBO, sepsis and tachycardia.  





palliative care notes reviewed and noted that if patient is to be made a dnr, 

there are forms that need to be filled out and special criteria listed.  There 

are 9 siblings as well as MHLS that would have to be involved.  patient is a 

Full Code.








- Current Medication List


Current Medications: 


Active Medications





Acetaminophen (Ofirmev Injection -)  500 mg IVPB Q6H PRN


   PRN Reason: PAIN OR FEVER


   Last Admin: 09/04/19 18:20 Dose:  500 mg


Albuterol/Ipratropium (Duoneb -)  1 amp NEB Q4H PRN


   PRN Reason: SHORTNESS OF BREATH


   Last Admin: 09/07/19 06:46 Dose:  1 amp


Metronidazole (Flagyl 500mg Premixed Ivpb -)  500 mg in 100 mls @ 100 mls/hr 

IVPB Q8H [facility]


   Last Admin: 09/07/19 04:28 Dose:  100 mls/hr


Piperacillin Sod/Tazobactam (Sod 3.375 gm/ Dextrose)  50 mls @ 100 mls/hr IVPB 

Q8H [facility]; Protocol


   Last Admin: 09/07/19 03:23 Dose:  100 mls/hr


Potassium Chloride 20 meq/ (Amino Acids)  1,010 mls @ 60 mls/hr IVPB Q12H [facility]


   Last Admin: 09/07/19 00:10 Dose:  60 mls/hr


Fat Emulsion Intravenous (Intralipid -)  250 mls @ 20.833 mls/hr IV Q2D@2200 [facility]


   Last Admin: 09/06/19 01:03 Dose:  20.833 mls/hr


Ketorolac Tromethamine (Toradol Injection -)  15 mg IVPUSH Q6H PRN


   PRN Reason: PAIN LEVEL 4 - 6


   Stop: 09/09/19 15:23


   Last Admin: 09/07/19 05:48 Dose:  15 mg


Levetiracetam (Keppra Injection -)  500 mg IVPB BID [facility]


   Last Admin: 09/06/19 22:47 Dose:  500 mg


Lorazepam (Ativan Injection -)  0.25 mg IVPUSH Q6H PRN


   PRN Reason: ANXIETY


Metoclopramide HCl (Reglan Injection -)  10 mg IVPUSH Q6H PRN


   PRN Reason: NAUSEA AND/OR VOMITING


   Last Admin: 09/04/19 03:22 Dose:  10 mg


Morphine Sulfate (Morphine Sulfate)  1 mg IVPUSH Q4H PRN


   PRN Reason: PAIN LEVEL 6-10


   Last Admin: 09/07/19 07:29 Dose:  1 mg


Ondansetron HCl (Zofran Injection)  4 mg IVPUSH Q6H PRN


   PRN Reason: NAUSEA


   Last Admin: 09/03/19 23:36 Dose:  4 mg


Pantoprazole Sodium (Protonix Iv)  40 mg IVPUSH DAILY [facility]


   Last Admin: 09/06/19 10:09 Dose:  40 mg


Scopolamine HBr (Transderm-Scop -)  1 patch TD Q72H [facility]


   Last Admin: 09/05/19 00:58 Dose:  1 patch











- Objective


Vital Signs: 


 Vital Signs











Temperature  98.9 F   09/07/19 05:00


 


Pulse Rate  121 H  09/07/19 05:00


 


Respiratory Rate  20   09/06/19 22:00


 


Blood Pressure  146/93   09/07/19 05:00


 


O2 Sat by Pulse Oximetry (%)  100   09/06/19 21:00











Constitutional: Yes: No Distress, Calm


Eyes: Yes: Conjunctiva Clear


HENT: Yes: Atraumatic


Neck: Yes: Supple


Cardiovascular: Yes: Tachycardia


Respiratory: Yes: On Nasal O2, Rhonchi


Gastrointestinal: Yes: Tenderness (ngt), Other


...Rectal Exam: Yes: Deferred


Musculoskeletal: Yes: Other (contracted)


Edema: No


Wound/Incision: Yes: Clean/Dry


Neurological: Yes: Alert


Labs: 


 CBC, BMP





 09/07/19 06:55 





 INR, PTT











INR  1.00  (0.82-1.09)   08/22/19  17:22    














Problem List





- Problems


(1) SBO (small bowel obstruction)


Assessment/Plan: 


NGT to low intermittent suction, still draining apx 200cc overnight.  

hypoactive bowel sounds.


NGT was removed on 8/31/2019 but had to be placed back emergently on 9/2/19 as 

patient had vomiting and worsening pain with respiratory distress and vomiting.

  Once reinserted, immediate drainage of 1L bilious content was noted.  


abd xray with sbo, persists.  surgery notes reviewed and palliative care was 

recommended.  


abdomen continues to be distended, absent bowel sounds.  


monitor drain output daily.  maintain with NGT.


surgery following


Code(s): K56.609 - UNSP INTESTNL OBST, UNSP AS TO PARTIAL VERSUS COMPLETE OBST 

  





(2) Sepsis


Assessment/Plan: 


on zosyn, vanco and flagyl per ID.  


id following


vanco trough today


Code(s): A41.9 - SEPSIS, UNSPECIFIED ORGANISM   





(3) Aspiration into airway


Assessment/Plan: 


chest xray shows left lower lobe infiltrate.  on zosyn per ID.  


support with 2 liters of nasal cannula to ease work of breathing. 


monitor oxygen levels and titrate off oxygen as tolerated.  not home oxygen 

dependent. 


on allbuterol prn for acute shortness of breath


maintain oxygen levels above 92% is goal


Code(s): T17.908A - UNSP FB IN RESP TRACT, PART UNSP CAUSING OTH INJURY, INIT   





(4) Iron deficiency anemia


Assessment/Plan: 


s/p 1 unit of prbc with appropriate response.   


Code(s): D50.9 - IRON DEFICIENCY ANEMIA, UNSPECIFIED   





(5) Decreased oral intake


Assessment/Plan: 


NPO.  on clinimax, lipids every other day


dietary following   


Code(s): R63.8 - OTHER SYMPTOMS AND SIGNS CONCERNING FOOD AND FLUID INTAKE   





(6) Seizure


Assessment/Plan: 


on keppra BID.


on tegratol in NH, continue keppra iv bid.  once no longer npo, can restart 

tegretol. 





Code(s): R56.9 - UNSPECIFIED CONVULSIONS   





(7) Hypernatremia


Assessment/Plan: 


renal consulted for electrolyte imbalance.  recommendations appreciated. 


Code(s): E87.0 - HYPEROSMOLALITY AND HYPERNATREMIA   





(8) Hyperkalemia


Assessment/Plan: 


resolved.  monitor K daily   


Code(s): E87.5 - HYPERKALEMIA   





(9) Profound mental handicap


Assessment/Plan: 


supportive care.





Code(s): F73 - PROFOUND INTELLECTUAL DISABILITIES   





(10) Tachycardia


Assessment/Plan: 


tachycardia likely in the setting of abdominal pain, anxiety, NGT- improving. 


can d/c tele per cardiology


 


Code(s): R00.0 - TACHYCARDIA, UNSPECIFIED   





(11) Left pulmonary infiltrate on CXR


Assessment/Plan: 


apply oxygen 2 liters.  patient noted to be congested and breathing rate 24s


on zosyn


 


Code(s): R91.8 - OTHER NONSPECIFIC ABNORMAL FINDING OF LUNG FIELD   





(12) Low serum phosphorus for age


Assessment/Plan: 


repeat phos levels. has been getting supplementation


repeat daily


Code(s): R79.0 - ABNORMAL LEVEL OF BLOOD MINERAL   





(13) Palliative care status


Assessment/Plan: 


seen by palliative care, notes reviewed.  





Code(s): Z51.5 - ENCOUNTER FOR PALLIATIVE CARE   





(14) Prophylactic measure


Assessment/Plan: 


fen


npo


on clinimax and lipids


monitor electrolytes





full code





Code(s): Z29.9 - ENCOUNTER FOR PROPHYLACTIC MEASURES, UNSPECIFIED   





Visit type





- Emergency Visit


Emergency Visit: Yes


ED Registration Date: 08/22/19


Care time: The patient presented to the Emergency Department on the above date 

and was hospitalized for further evaluation of their emergent condition.





- New Patient


This patient is new to me today: No





- Critical Care


Critical Care patient: No





- Discharge Referral


Referred to [facility] No

## 2019-09-08 LAB
ALBUMIN SERPL-MCNC: 1.6 G/DL (ref 3.4–5)
ALP SERPL-CCNC: 117 U/L (ref 45–117)
ALT SERPL-CCNC: 7 U/L (ref 13–61)
ANION GAP SERPL CALC-SCNC: 4 MMOL/L (ref 8–16)
ANISOCYTOSIS BLD QL: (no result)
AST SERPL-CCNC: 20 U/L (ref 15–37)
BASOPHILS # BLD: 0.3 % (ref 0–2)
BILIRUB SERPL-MCNC: 0.5 MG/DL (ref 0.2–1)
BUN SERPL-MCNC: 9.6 MG/DL (ref 7–18)
CALCIUM SERPL-MCNC: 8 MG/DL (ref 8.5–10.1)
CHLORIDE SERPL-SCNC: 105 MMOL/L (ref 98–107)
CO2 SERPL-SCNC: 31 MMOL/L (ref 21–32)
CREAT SERPL-MCNC: < 0.2 MG/DL (ref 0.55–1.3)
DEPRECATED RDW RBC AUTO: 15.7 % (ref 11.6–15.6)
EOSINOPHIL # BLD: 0 % (ref 0–4.5)
GLUCOSE SERPL-MCNC: 120 MG/DL (ref 74–106)
HCT VFR BLD CALC: 28.6 % (ref 32.4–45.2)
HGB BLD-MCNC: 9.5 GM/DL (ref 10.7–15.3)
LYMPHOCYTES # BLD: 5.9 % (ref 8–40)
MACROCYTES BLD QL: 0
MAGNESIUM SERPL-MCNC: 1.7 MG/DL (ref 1.8–2.4)
MCH RBC QN AUTO: 28 PG (ref 25.7–33.7)
MCHC RBC AUTO-ENTMCNC: 33.3 G/DL (ref 32–36)
MCV RBC: 84.1 FL (ref 80–96)
MONOCYTES # BLD AUTO: 5.7 % (ref 3.8–10.2)
NEUTROPHILS # BLD: 88.1 % (ref 42.8–82.8)
PHOSPHATE SERPL-MCNC: 0.9 MG/DL (ref 2.5–4.9)
PLATELET # BLD AUTO: 207 K/MM3 (ref 134–434)
PLATELET BLD QL SMEAR: NORMAL
PMV BLD: 8.9 FL (ref 7.5–11.1)
POTASSIUM SERPLBLD-SCNC: 3.9 MMOL/L (ref 3.5–5.1)
PROT SERPL-MCNC: 4.4 G/DL (ref 6.4–8.2)
RBC # BLD AUTO: 3.4 M/MM3 (ref 3.6–5.2)
SODIUM SERPL-SCNC: 140 MMOL/L (ref 136–145)
WBC # BLD AUTO: 9.2 K/MM3 (ref 4–10)

## 2019-09-08 RX ADMIN — MORPHINE SULFATE PRN MG: 2 INJECTION, SOLUTION INTRAMUSCULAR; INTRAVENOUS at 21:03

## 2019-09-08 RX ADMIN — NYSTATIN SCH APPLIC: 100000 POWDER TOPICAL at 22:59

## 2019-09-08 RX ADMIN — POTASSIUM CHLORIDE SCH MLS/HR: 149 INJECTION, SOLUTION, CONCENTRATE INTRAVENOUS at 20:28

## 2019-09-08 RX ADMIN — ACETAMINOPHEN PRN MG: 10 INJECTION, SOLUTION INTRAVENOUS at 06:19

## 2019-09-08 RX ADMIN — NYSTATIN SCH APPLIC: 100000 POWDER TOPICAL at 12:55

## 2019-09-08 RX ADMIN — LEVETIRACETAM SCH MG: 100 INJECTION, SOLUTION, CONCENTRATE INTRAVENOUS at 22:56

## 2019-09-08 RX ADMIN — LEVETIRACETAM SCH MG: 100 INJECTION, SOLUTION, CONCENTRATE INTRAVENOUS at 11:51

## 2019-09-08 RX ADMIN — MORPHINE SULFATE PRN MG: 2 INJECTION, SOLUTION INTRAMUSCULAR; INTRAVENOUS at 03:33

## 2019-09-08 RX ADMIN — MORPHINE SULFATE PRN MG: 2 INJECTION, SOLUTION INTRAMUSCULAR; INTRAVENOUS at 17:50

## 2019-09-08 RX ADMIN — KETOROLAC TROMETHAMINE PRN MG: 30 INJECTION INTRAMUSCULAR; INTRAVENOUS at 02:42

## 2019-09-08 RX ADMIN — PIPERACILLIN SODIUM,TAZOBACTAM SODIUM SCH MLS/HR: 3; .375 INJECTION, POWDER, FOR SOLUTION INTRAVENOUS at 02:40

## 2019-09-08 RX ADMIN — MORPHINE SULFATE PRN MG: 2 INJECTION, SOLUTION INTRAMUSCULAR; INTRAVENOUS at 08:06

## 2019-09-08 RX ADMIN — MORPHINE SULFATE PRN MG: 2 INJECTION, SOLUTION INTRAMUSCULAR; INTRAVENOUS at 14:49

## 2019-09-08 RX ADMIN — IPRATROPIUM BROMIDE AND ALBUTEROL SULFATE SCH AMP: .5; 3 SOLUTION RESPIRATORY (INHALATION) at 23:49

## 2019-09-08 RX ADMIN — KETOROLAC TROMETHAMINE PRN MG: 30 INJECTION INTRAMUSCULAR; INTRAVENOUS at 16:46

## 2019-09-08 RX ADMIN — PIPERACILLIN SODIUM,TAZOBACTAM SODIUM SCH MLS/HR: 3; .375 INJECTION, POWDER, FOR SOLUTION INTRAVENOUS at 11:51

## 2019-09-08 RX ADMIN — POTASSIUM CHLORIDE SCH: 149 INJECTION, SOLUTION, CONCENTRATE INTRAVENOUS at 11:52

## 2019-09-08 RX ADMIN — PANTOPRAZOLE SODIUM SCH MG: 40 INJECTION, POWDER, FOR SOLUTION INTRAVENOUS at 11:51

## 2019-09-08 RX ADMIN — PIPERACILLIN SODIUM,TAZOBACTAM SODIUM SCH MLS/HR: 3; .375 INJECTION, POWDER, FOR SOLUTION INTRAVENOUS at 19:53

## 2019-09-08 RX ADMIN — MORPHINE SULFATE PRN MG: 2 INJECTION, SOLUTION INTRAMUSCULAR; INTRAVENOUS at 11:49

## 2019-09-08 RX ADMIN — IPRATROPIUM BROMIDE AND ALBUTEROL SULFATE SCH AMP: .5; 3 SOLUTION RESPIRATORY (INHALATION) at 20:00

## 2019-09-08 NOTE — PN
Progress Note, Physician


Chief Complaint: 





family reports that patient is staying up all night, uncomfortable despite the 

morphine.  patient reported to be crying most of the night that they feel can 

either be from anxiety or pain.





from previous noted.


Spoke to Dr. Shailesh Rodriguez ([place] covering MD) on 9/5/2019.  He is currently 

covering for Dr Segal.  Spoke to him in presence of  Shelly regarding Mrs Wearing hospitalization, surgery notes as well as 

current status.  made him aware that we will need legal paperwork if patient 

was to be made a dnr/dni or if consents need to be sign for this patient.  Dr. Shailesh Rodriguez informed me that he will be reaching out to the  and will ask 

them to either call me or social work.


History of Present Illness: 





Patient is a 59 year old female (resident of [facility]) with a PMHx 

significant for profound mental retardation, cerebral palsy, congenital 

quadriplegia, severe scoliosis, seizure disorder, and GERD.  Transferred from 

[place] to [facility] for further management of SBO, sepsis and tachycardia.  





palliative care notes reviewed and noted that if patient is to be made a dnr, 

there are forms that need to be filled out and special criteria listed.  There 

are 9 siblings as well as MHLS that would have to be involved.  


patient is a Full Code.





will order the following


ativan 0.25 mg scheduled for midnight as well as prn for anxiety.


schedule duonebs q4


morphine 1mg q 3 instead of q 4 for worsening pain








- Current Medication List


Current Medications: 


Active Medications





Acetaminophen (Ofirmev Injection -)  500 mg IVPB Q6H PRN


   PRN Reason: PAIN OR FEVER


   Last Admin: 09/08/19 06:19 Dose:  500 mg


Albuterol/Ipratropium (Duoneb -)  1 amp NEB Q4H PRN


   PRN Reason: SHORTNESS OF BREATH


   Last Admin: 09/07/19 21:10 Dose:  1 amp


Metronidazole (Flagyl 500mg Premixed Ivpb -)  500 mg in 100 mls @ 100 mls/hr 

IVPB Q8H [facility]


   Last Admin: 09/08/19 11:51 Dose:  100 mls/hr


Piperacillin Sod/Tazobactam (Sod 3.375 gm/ Dextrose)  50 mls @ 100 mls/hr IVPB 

Q8H [facility]; Protocol


   Last Admin: 09/08/19 11:51 Dose:  100 mls/hr


Potassium Chloride 20 meq/ (Amino Acids)  1,010 mls @ 60 mls/hr IVPB Q12H [facility]


   Last Admin: 09/08/19 11:52 Dose:  Not Given


Fat Emulsion Intravenous (Intralipid -)  250 mls @ 20.833 mls/hr IV Q2D@2200 [facility]


   Last Admin: 09/07/19 23:14 Dose:  20.833 mls/hr


Potassium Phosphate 45 mm/ (Sodium Chloride)  265 mls @ 62.5 mls/hr IVPB ONCE 

ONE


   Stop: 09/08/19 17:16


Ketorolac Tromethamine (Toradol Injection -)  15 mg IVPUSH Q6H PRN


   PRN Reason: PAIN LEVEL 4 - 6


   Stop: 09/09/19 15:23


   Last Admin: 09/08/19 02:42 Dose:  15 mg


Levetiracetam (Keppra Injection -)  500 mg IVPB BID [facility]


   Last Admin: 09/08/19 11:51 Dose:  500 mg


Lorazepam (Ativan Injection -)  0.25 mg IVPUSH Q6H PRN


   PRN Reason: ANXIETY


   Last Admin: 09/07/19 22:42 Dose:  0.25 mg


Lorazepam (Ativan Injection -)  0.25 mg IVPUSH ONCE ONE


   Stop: 09/09/19 00:01


Metoclopramide HCl (Reglan Injection -)  10 mg IVPUSH Q6H PRN


   PRN Reason: NAUSEA AND/OR VOMITING


   Last Admin: 09/04/19 03:22 Dose:  10 mg


Morphine Sulfate (Morphine Sulfate)  1 mg IVPUSH Q3H PRN


   PRN Reason: PAIN LEVEL 6-10


Nystatin (Nystop Powder -)  1 applic TP BID [facility]


   Last Admin: 09/08/19 12:55 Dose:  1 applic


Ondansetron HCl (Zofran Injection)  4 mg IVPUSH Q6H PRN


   PRN Reason: NAUSEA


   Last Admin: 09/03/19 23:36 Dose:  4 mg


Pantoprazole Sodium (Protonix Iv)  40 mg IVPUSH DAILY [facility]


   Last Admin: 09/08/19 11:51 Dose:  40 mg


Petrolatum (Chapstick -)  1 applic TP ONCE ONE


   Stop: 09/08/19 13:03


Scopolamine HBr (Transderm-Scop -)  1 patch TD Q72H [facility]


   Last Admin: 09/07/19 23:48 Dose:  1 patch











- Objective


Vital Signs: 


 Vital Signs











Temperature  99.3 F   09/07/19 21:30


 


Pulse Rate  125 H  09/07/19 21:30


 


Respiratory Rate  20   09/07/19 21:30


 


Blood Pressure  144/88   09/07/19 21:30


 


O2 Sat by Pulse Oximetry (%)  100   09/07/19 22:00











Labs: 


 CBC, BMP





 09/08/19 07:10 





 09/08/19 07:10 





 INR, PTT











INR  1.00  (0.82-1.09)   08/22/19  17:22    














Problem List





- Problems


(1) SBO (small bowel obstruction)


Assessment/Plan: 


NGT to low intermittent suction, still draining apx 100cc overnight.  

hypoactive bowel sounds.


NGT was removed on 8/31/2019 but had to be placed back emergently on 9/2/19 as 

patient had vomiting and worsening pain with respiratory distress and vomiting.

  monitor drain output daily.  maintain with NGT.


surgery following


Code(s): K56.609 - UNSP INTESTNL OBST, UNSP AS TO PARTIAL VERSUS COMPLETE OBST 

  





(2) Sepsis


Assessment/Plan: 


on zosyn, and flagyl per ID.  


id following


Code(s): A41.9 - SEPSIS, UNSPECIFIED ORGANISM   





(3) Aspiration into airway


Assessment/Plan: 


chest xray shows left lower lobe infiltrate.  on zosyn per ID.  support with 2 

liters of nasal cannula to ease work of breathing. 


on allbuterol scheduled for acute shortness of breath


maintain oxygen levels above 92% is goal


Code(s): T17.908A - UNSP FB IN RESP TRACT, PART UNSP CAUSING OTH INJURY, INIT   





(4) Iron deficiency anemia


Assessment/Plan: 


s/p 1 unit of prbc with appropriate response.   


Code(s): D50.9 - IRON DEFICIENCY ANEMIA, UNSPECIFIED   





(5) Decreased oral intake


Assessment/Plan: 


NPO.  on clinimax, lipids every other day


dietary following   


Code(s): R63.8 - OTHER SYMPTOMS AND SIGNS CONCERNING FOOD AND FLUID INTAKE   





(6) Seizure


Assessment/Plan: 


on keppra BID.


on tegratol in NH, continue keppra iv bid.  once no longer npo, can restart 

tegretol. 





Code(s): R56.9 - UNSPECIFIED CONVULSIONS   





(7) Hypernatremia


Assessment/Plan: 


renal consulted for electrolyte imbalance.  recommendations appreciated. 


Code(s): E87.0 - HYPEROSMOLALITY AND HYPERNATREMIA   





(8) Hyperkalemia


Assessment/Plan: 


resolved.  monitor K daily   


Code(s): E87.5 - HYPERKALEMIA   





(9) Profound mental handicap


Assessment/Plan: 


supportive care.





Code(s): F73 - PROFOUND INTELLECTUAL DISABILITIES   





(10) Tachycardia


Assessment/Plan: 


tachycardia likely in the setting of abdominal pain, anxiety, NGT- improving. 


can d/c tele per cardiology


 


Code(s): R00.0 - TACHYCARDIA, UNSPECIFIED   





(11) Left pulmonary infiltrate on CXR


Assessment/Plan: 


apply oxygen 2 liters.  patient noted to be congested and breathing rate 24s


on zosyn.  schedule duonebs.  on scapolamine patch for increased congestion.


 


Code(s): R91.8 - OTHER NONSPECIFIC ABNORMAL FINDING OF LUNG FIELD   





(12) Low serum phosphorus for age


Assessment/Plan: 


repeat phos levels. has been getting supplementation


repeat daily


Code(s): R79.0 - ABNORMAL LEVEL OF BLOOD MINERAL   





(13) Palliative care status


Assessment/Plan: 


seen by palliative care, notes reviewed.  


Code(s): Z51.5 - ENCOUNTER FOR PALLIATIVE CARE   





(14) Prophylactic measure


Assessment/Plan: 


fen


npo


on clinimax and lipids


monitor electrolytes





full code





Code(s): Z29.9 - ENCOUNTER FOR PROPHYLACTIC MEASURES, UNSPECIFIED   





Visit type





- Emergency Visit


Emergency Visit: Yes


ED Registration Date: 08/22/19


Care time: The patient presented to the Emergency Department on the above date 

and was hospitalized for further evaluation of their emergent condition.





- New Patient


This patient is new to me today: No





- Critical Care


Critical Care patient: No





- Discharge Referral


Referred to [facility] No

## 2019-09-08 NOTE — PN
Progress Note, Physician





- Current Medication List


Current Medications: 


Active Medications





Acetaminophen (Ofirmev Injection -)  500 mg IVPB Q6H PRN


   PRN Reason: PAIN OR FEVER


   Last Admin: 09/08/19 06:19 Dose:  500 mg


Albuterol/Ipratropium (Duoneb -)  1 amp NEB RQ4H [facility]


Metronidazole (Flagyl 500mg Premixed Ivpb -)  500 mg in 100 mls @ 100 mls/hr 

IVPB Q8H [facility]


   Last Admin: 09/08/19 11:51 Dose:  100 mls/hr


Piperacillin Sod/Tazobactam (Sod 3.375 gm/ Dextrose)  50 mls @ 100 mls/hr IVPB 

Q8H [facility]; Protocol


   Last Admin: 09/08/19 11:51 Dose:  100 mls/hr


Potassium Chloride 20 meq/ (Amino Acids)  1,010 mls @ 60 mls/hr IVPB Q12H [facility]


   Last Admin: 09/08/19 11:52 Dose:  Not Given


Fat Emulsion Intravenous (Intralipid -)  250 mls @ 20.833 mls/hr IV Q2D@2200 [facility]


   Last Admin: 09/07/19 23:14 Dose:  20.833 mls/hr


Potassium Phosphate 45 mm/ (Sodium Chloride)  515 mls @ 62.5 mls/hr IVPB ONCE 

ONE


   Stop: 09/08/19 21:59


Ketorolac Tromethamine (Toradol Injection -)  15 mg IVPUSH Q6H PRN


   PRN Reason: PAIN LEVEL 4 - 6


   Stop: 09/09/19 15:23


   Last Admin: 09/08/19 02:42 Dose:  15 mg


Levetiracetam (Keppra Injection -)  500 mg IVPB BID [facility]


   Last Admin: 09/08/19 11:51 Dose:  500 mg


Lorazepam (Ativan Injection -)  0.25 mg IVPUSH Q6H PRN


   PRN Reason: ANXIETY


   Last Admin: 09/07/19 22:42 Dose:  0.25 mg


Lorazepam (Ativan Injection -)  0.25 mg IVPUSH ONCE ONE


   Stop: 09/09/19 00:01


Metoclopramide HCl (Reglan Injection -)  10 mg IVPUSH Q6H PRN


   PRN Reason: NAUSEA AND/OR VOMITING


   Last Admin: 09/04/19 03:22 Dose:  10 mg


Morphine Sulfate (Morphine Sulfate)  1 mg IVPUSH Q3H PRN


   PRN Reason: PAIN LEVEL 6-10


   Last Admin: 09/08/19 14:49 Dose:  1 mg


Nystatin (Nystop Powder -)  1 applic TP BID [facility]


   Last Admin: 09/08/19 12:55 Dose:  1 applic


Ondansetron HCl (Zofran Injection)  4 mg IVPUSH Q6H PRN


   PRN Reason: NAUSEA


   Last Admin: 09/03/19 23:36 Dose:  4 mg


Pantoprazole Sodium (Protonix Iv)  40 mg IVPUSH DAILY [facility]


   Last Admin: 09/08/19 11:51 Dose:  40 mg


Scopolamine HBr (Transderm-Scop -)  1 patch TD Q72H [facility]


   Last Admin: 09/07/19 23:48 Dose:  1 patch











- Objective


Vital Signs: 


 Vital Signs











Temperature  99.3 F   09/08/19 13:47


 


Pulse Rate  125 H  09/08/19 13:47


 


Respiratory Rate  20   09/08/19 13:47


 


Blood Pressure  144/88   09/07/19 21:30


 


O2 Sat by Pulse Oximetry (%)  100   09/07/19 22:00











Labs: 


 CBC, BMP





 09/08/19 07:10 





 09/08/19 07:10 





 INR, PTT











INR  1.00  (0.82-1.09)   08/22/19  17:22

## 2019-09-09 LAB
ALBUMIN SERPL-MCNC: 1.6 G/DL (ref 3.4–5)
ALP SERPL-CCNC: 123 U/L (ref 45–117)
ALT SERPL-CCNC: < 6 U/L (ref 13–61)
ANION GAP SERPL CALC-SCNC: 4 MMOL/L (ref 8–16)
AST SERPL-CCNC: 16 U/L (ref 15–37)
BASOPHILS # BLD: 0.3 % (ref 0–2)
BILIRUB SERPL-MCNC: 0.5 MG/DL (ref 0.2–1)
BUN SERPL-MCNC: 10.3 MG/DL (ref 7–18)
CALCIUM SERPL-MCNC: 7.6 MG/DL (ref 8.5–10.1)
CHLORIDE SERPL-SCNC: 106 MMOL/L (ref 98–107)
CO2 SERPL-SCNC: 32 MMOL/L (ref 21–32)
CREAT SERPL-MCNC: < 0.2 MG/DL (ref 0.55–1.3)
DEPRECATED RDW RBC AUTO: 15.4 % (ref 11.6–15.6)
EOSINOPHIL # BLD: 0 % (ref 0–4.5)
GLUCOSE SERPL-MCNC: 92 MG/DL (ref 74–106)
HCT VFR BLD CALC: 27.8 % (ref 32.4–45.2)
HGB BLD-MCNC: 9.2 GM/DL (ref 10.7–15.3)
LYMPHOCYTES # BLD: 5.6 % (ref 8–40)
MAGNESIUM SERPL-MCNC: 1.7 MG/DL (ref 1.8–2.4)
MCH RBC QN AUTO: 27.9 PG (ref 25.7–33.7)
MCHC RBC AUTO-ENTMCNC: 33 G/DL (ref 32–36)
MCV RBC: 84.5 FL (ref 80–96)
MONOCYTES # BLD AUTO: 5.8 % (ref 3.8–10.2)
NEUTROPHILS # BLD: 88.3 % (ref 42.8–82.8)
PHOSPHATE SERPL-MCNC: 1.5 MG/DL (ref 2.5–4.9)
PLATELET # BLD AUTO: 202 K/MM3 (ref 134–434)
PMV BLD: 8.6 FL (ref 7.5–11.1)
POTASSIUM SERPLBLD-SCNC: 3.8 MMOL/L (ref 3.5–5.1)
PROT SERPL-MCNC: 4.5 G/DL (ref 6.4–8.2)
RBC # BLD AUTO: 3.29 M/MM3 (ref 3.6–5.2)
SODIUM SERPL-SCNC: 142 MMOL/L (ref 136–145)
WBC # BLD AUTO: 8.4 K/MM3 (ref 4–10)

## 2019-09-09 RX ADMIN — MORPHINE SULFATE PRN MG: 2 INJECTION, SOLUTION INTRAMUSCULAR; INTRAVENOUS at 03:29

## 2019-09-09 RX ADMIN — IPRATROPIUM BROMIDE AND ALBUTEROL SULFATE SCH AMP: .5; 3 SOLUTION RESPIRATORY (INHALATION) at 12:01

## 2019-09-09 RX ADMIN — POTASSIUM CHLORIDE SCH: 149 INJECTION, SOLUTION, CONCENTRATE INTRAVENOUS at 10:15

## 2019-09-09 RX ADMIN — KETOROLAC TROMETHAMINE PRN MG: 30 INJECTION INTRAMUSCULAR; INTRAVENOUS at 02:01

## 2019-09-09 RX ADMIN — ACETAMINOPHEN PRN MG: 10 INJECTION, SOLUTION INTRAVENOUS at 23:01

## 2019-09-09 RX ADMIN — I.V. FAT EMULSION SCH MLS/HR: 20 EMULSION INTRAVENOUS at 21:12

## 2019-09-09 RX ADMIN — LORAZEPAM PRN MG: 2 INJECTION INTRAMUSCULAR; INTRAVENOUS at 19:00

## 2019-09-09 RX ADMIN — MORPHINE SULFATE PRN MG: 2 INJECTION, SOLUTION INTRAMUSCULAR; INTRAVENOUS at 17:55

## 2019-09-09 RX ADMIN — PANTOPRAZOLE SODIUM SCH MG: 40 INJECTION, POWDER, FOR SOLUTION INTRAVENOUS at 10:16

## 2019-09-09 RX ADMIN — MORPHINE SULFATE PRN MG: 2 INJECTION, SOLUTION INTRAMUSCULAR; INTRAVENOUS at 13:09

## 2019-09-09 RX ADMIN — HEPARIN SODIUM SCH UNIT: 5000 INJECTION, SOLUTION INTRAVENOUS; SUBCUTANEOUS at 21:11

## 2019-09-09 RX ADMIN — PIPERACILLIN SODIUM,TAZOBACTAM SODIUM SCH MLS/HR: 3; .375 INJECTION, POWDER, FOR SOLUTION INTRAVENOUS at 11:48

## 2019-09-09 RX ADMIN — KETOROLAC TROMETHAMINE PRN MG: 30 INJECTION INTRAMUSCULAR; INTRAVENOUS at 10:03

## 2019-09-09 RX ADMIN — LORAZEPAM PRN MG: 2 INJECTION INTRAMUSCULAR; INTRAVENOUS at 11:59

## 2019-09-09 RX ADMIN — IPRATROPIUM BROMIDE AND ALBUTEROL SULFATE SCH AMP: .5; 3 SOLUTION RESPIRATORY (INHALATION) at 07:01

## 2019-09-09 RX ADMIN — LEVETIRACETAM SCH MG: 100 INJECTION, SOLUTION, CONCENTRATE INTRAVENOUS at 21:11

## 2019-09-09 RX ADMIN — MORPHINE SULFATE PRN MG: 2 INJECTION, SOLUTION INTRAMUSCULAR; INTRAVENOUS at 21:11

## 2019-09-09 RX ADMIN — IPRATROPIUM BROMIDE AND ALBUTEROL SULFATE SCH: .5; 3 SOLUTION RESPIRATORY (INHALATION) at 23:04

## 2019-09-09 RX ADMIN — HEPARIN SODIUM SCH UNIT: 5000 INJECTION, SOLUTION INTRAVENOUS; SUBCUTANEOUS at 10:17

## 2019-09-09 RX ADMIN — POTASSIUM CHLORIDE SCH MLS/HR: 149 INJECTION, SOLUTION, CONCENTRATE INTRAVENOUS at 20:54

## 2019-09-09 RX ADMIN — ACETAMINOPHEN PRN MG: 10 INJECTION, SOLUTION INTRAVENOUS at 03:22

## 2019-09-09 RX ADMIN — MORPHINE SULFATE PRN MG: 2 INJECTION, SOLUTION INTRAMUSCULAR; INTRAVENOUS at 06:47

## 2019-09-09 RX ADMIN — IPRATROPIUM BROMIDE AND ALBUTEROL SULFATE SCH AMP: .5; 3 SOLUTION RESPIRATORY (INHALATION) at 16:39

## 2019-09-09 RX ADMIN — KETOROLAC TROMETHAMINE PRN MG: 30 INJECTION INTRAMUSCULAR; INTRAVENOUS at 22:38

## 2019-09-09 RX ADMIN — IPRATROPIUM BROMIDE AND ALBUTEROL SULFATE SCH: .5; 3 SOLUTION RESPIRATORY (INHALATION) at 04:02

## 2019-09-09 RX ADMIN — NYSTATIN SCH APPLIC: 100000 POWDER TOPICAL at 21:12

## 2019-09-09 RX ADMIN — MORPHINE SULFATE PRN MG: 2 INJECTION, SOLUTION INTRAMUSCULAR; INTRAVENOUS at 00:29

## 2019-09-09 RX ADMIN — IPRATROPIUM BROMIDE AND ALBUTEROL SULFATE SCH AMP: .5; 3 SOLUTION RESPIRATORY (INHALATION) at 20:32

## 2019-09-09 RX ADMIN — PIPERACILLIN SODIUM,TAZOBACTAM SODIUM SCH MLS/HR: 3; .375 INJECTION, POWDER, FOR SOLUTION INTRAVENOUS at 03:27

## 2019-09-09 RX ADMIN — PIPERACILLIN SODIUM,TAZOBACTAM SODIUM SCH MLS/HR: 3; .375 INJECTION, POWDER, FOR SOLUTION INTRAVENOUS at 18:49

## 2019-09-09 RX ADMIN — MORPHINE SULFATE PRN MG: 2 INJECTION, SOLUTION INTRAMUSCULAR; INTRAVENOUS at 23:50

## 2019-09-09 RX ADMIN — LEVETIRACETAM SCH MG: 100 INJECTION, SOLUTION, CONCENTRATE INTRAVENOUS at 10:42

## 2019-09-09 RX ADMIN — NYSTATIN SCH APPLIC: 100000 POWDER TOPICAL at 10:18

## 2019-09-09 NOTE — PN
Progress Note, Physician


History of Present Illness: 





Pt seen and examined at bedside. She is getting fluids. Mental status at 

baseline. 





- Current Medication List


Current Medications: 


Active Medications





Acetaminophen (Ofirmev Injection -)  500 mg IVPB Q6H PRN


   PRN Reason: PAIN OR FEVER


   Last Admin: 09/09/19 03:22 Dose:  500 mg


Albuterol/Ipratropium (Duoneb -)  1 amp NEB RQ4H [facility]


   Last Admin: 09/09/19 04:02 Dose:  Not Given


Heparin Sodium (Porcine) (Heparin -)  5,000 unit SQ BID [facility]


   Last Admin: 09/09/19 10:17 Dose:  5,000 unit


Metronidazole (Flagyl 500mg Premixed Ivpb -)  500 mg in 100 mls @ 100 mls/hr 

IVPB Q8H [facility]


   Last Admin: 09/09/19 04:22 Dose:  100 mls/hr


Piperacillin Sod/Tazobactam (Sod 3.375 gm/ Dextrose)  50 mls @ 100 mls/hr IVPB 

Q8H [facility]; Protocol


   Last Admin: 09/09/19 11:48 Dose:  100 mls/hr


Potassium Chloride 20 meq/ (Amino Acids)  1,010 mls @ 60 mls/hr IVPB Q12H [facility]


   Last Admin: 09/09/19 10:15 Dose:  Not Given


Fat Emulsion Intravenous (Intralipid -)  250 mls @ 20.833 mls/hr IV Q2D@2200 [facility]


   Last Admin: 09/07/19 23:14 Dose:  20.833 mls/hr


Potassium Phosphate 45 mm/ (Sodium Chloride)  515 mls @ 62.5 mls/hr IVPB ONCE 

ONE


   Stop: 09/09/19 18:14


   Last Admin: 09/09/19 11:49 Dose:  62.5 mls/hr


Ketorolac Tromethamine (Toradol Injection -)  15 mg IVPUSH Q6H PRN


   PRN Reason: PAIN LEVEL 4 - 6


   Stop: 09/09/19 15:23


   Last Admin: 09/09/19 10:03 Dose:  15 mg


Levetiracetam (Keppra Injection -)  500 mg IVPB BID [facility]


   Last Admin: 09/09/19 10:42 Dose:  500 mg


Lorazepam (Ativan Injection -)  0.25 mg IVPUSH Q6H PRN


   PRN Reason: ANXIETY


   Last Admin: 09/07/19 22:42 Dose:  0.25 mg


Metoclopramide HCl (Reglan Injection -)  10 mg IVPUSH Q6H PRN


   PRN Reason: NAUSEA AND/OR VOMITING


   Last Admin: 09/04/19 03:22 Dose:  10 mg


Morphine Sulfate (Morphine Sulfate)  1 mg IVPUSH Q3H PRN


   PRN Reason: PAIN LEVEL 6-10


   Last Admin: 09/09/19 06:47 Dose:  1 mg


Nystatin (Nystop Powder -)  1 applic TP BID [facility]


   Last Admin: 09/09/19 10:18 Dose:  1 applic


Ondansetron HCl (Zofran Injection)  4 mg IVPUSH Q6H PRN


   PRN Reason: NAUSEA


   Last Admin: 09/03/19 23:36 Dose:  4 mg


Pantoprazole Sodium (Protonix Iv)  40 mg IVPUSH DAILY [facility]


   Last Admin: 09/09/19 10:16 Dose:  40 mg


Scopolamine HBr (Transderm-Scop -)  1 patch TD Q72H [facility]


   Last Admin: 09/07/19 23:48 Dose:  1 patch











- Objective


Vital Signs: 


 Vital Signs











Temperature  98.1 F   09/09/19 10:00


 


Pulse Rate  112 H  09/09/19 10:00


 


Respiratory Rate  22 H  09/09/19 10:00


 


Blood Pressure  137/89   09/09/19 10:00


 


O2 Sat by Pulse Oximetry (%)  100   09/08/19 21:00











Constitutional: Yes: Mild Distress


Eyes: Yes: Conjunctiva Clear


HENT: Yes: Atraumatic


Neck: Yes: Supple


Cardiovascular: Yes: S1, S2


Respiratory: Yes: On Nasal O2


Gastrointestinal: Yes: Soft


Genitourinary: Yes: Incontinence


Musculoskeletal: Yes: Muscle Weakness


Edema: No


Neurological: Yes: Pre-Existing Deficit


Labs: 


 CBC, BMP





 09/09/19 07:00 





 09/09/19 07:00 





 INR, PTT











INR  1.00  (0.82-1.09)   08/22/19  17:22    














Problem List





- Problems


(1) Hyperkalemia


Code(s): E87.5 - HYPERKALEMIA   





(2) Hypernatremia


Code(s): E87.0 - HYPEROSMOLALITY AND HYPERNATREMIA   





(3) SBO (small bowel obstruction)


Code(s): K56.609 - UNSP INTESTNL OBST, UNSP AS TO PARTIAL VERSUS COMPLETE OBST 

  





(4) Seizure


Code(s): R56.9 - UNSPECIFIED CONVULSIONS   





Assessment/Plan


 Current Medications











Generic Name Dose Route Start Last Admin





  Trade Name Freq  PRN Reason Stop Dose Admin


 


Acetaminophen  500 mg  09/01/19 18:53  09/09/19 03:22





  Ofirmev Injection -  IVPB   500 mg





  Q6H PRN   Administration





  PAIN OR FEVER   





     





     





     


 


Albuterol/Ipratropium  1 amp  09/08/19 13:15  09/09/19 04:02





  Duoneb -  NEB   Not Given





  RQ4H [facility]   





     





     





     





     


 


Heparin Sodium (Porcine)  5,000 unit  09/09/19 10:00  09/09/19 10:17





  Heparin -  SQ   5,000 unit





  BID [facility]   Administration





     





     





     





     


 


Metronidazole  500 mg in 100 mls @ 100 mls/hr  09/01/19 19:30  09/09/19 04:22





  Flagyl 500mg Premixed Ivpb -  IVPB   100 mls/hr





  Q8H [facility]   Administration





     





     





     





     


 


Piperacillin Sod/Tazobactam  50 mls @ 100 mls/hr  09/01/19 19:30  09/09/19 11:48





  Sod 3.375 gm/ Dextrose  IVPB   100 mls/hr





  Q8H [facility]   Administration





     





     





  Protocol   





     


 


Potassium Chloride 20 meq/  1,010 mls @ 60 mls/hr  09/01/19 20:00  09/09/19 10:

15





  Amino Acids  IVPB   Not Given





  Q12H [facility]   





     





     





     





     


 


Fat Emulsion Intravenous  250 mls @ 20.833 mls/hr  09/05/19 22:00  09/07/19 23:

14





  Intralipid -  IV   20.833 mls/hr





  Q2D@2200 [facility]   Administration





     





     





     





     


 


Potassium Phosphate 45 mm/  515 mls @ 62.5 mls/hr  09/09/19 10:00  09/09/19 11:

49





  Sodium Chloride  IVPB  09/09/19 18:14  62.5 mls/hr





  ONCE ONE   Administration





     





     





     





     


 


Ketorolac Tromethamine  15 mg  09/04/19 15:24  09/09/19 10:03





  Toradol Injection -  IVPUSH  09/09/19 15:23  15 mg





  Q6H PRN   Administration





  PAIN LEVEL 4 - 6   





     





     





     


 


Levetiracetam  500 mg  09/01/19 22:00  09/09/19 10:42





  Keppra Injection -  IVPB   500 mg





  BID [facility]   Administration





     





     





     





     


 


Lorazepam  0.25 mg  09/07/19 09:05  09/07/19 22:42





  Ativan Injection -  IVPUSH   0.25 mg





  Q6H PRN   Administration





  ANXIETY   





     





     





     


 


Metoclopramide HCl  10 mg  09/01/19 18:55  09/04/19 03:22





  Reglan Injection -  IVPUSH   10 mg





  Q6H PRN   Administration





  NAUSEA AND/OR VOMITING   





     





     





     


 


Morphine Sulfate  1 mg  09/08/19 13:06  09/09/19 06:47





  Morphine Sulfate  IVPUSH   1 mg





  Q3H PRN   Administration





  PAIN LEVEL 6-10   





     





     





     


 


Nystatin  1 applic  09/07/19 10:00  09/09/19 10:18





  Nystop Powder -  TP   1 applic





  BID [facility]   Administration





     





     





     





     


 


Ondansetron HCl  4 mg  09/01/19 18:55  09/03/19 23:36





  Zofran Injection  IVPUSH   4 mg





  Q6H PRN   Administration





  NAUSEA   





     





     





     


 


Pantoprazole Sodium  40 mg  09/05/19 10:00  09/09/19 10:16





  Protonix Iv  IVPUSH   40 mg





  DAILY [facility]   Administration





     





     





     





     


 


Scopolamine HBr  1 patch  09/04/19 23:00  09/07/19 23:48





  Transderm-Scop -  TD   1 patch





  Q72H [facility]   Administration





     





     





     





     











Impression


1. hypernatremia


2. hyperkalemia - resolved


3. SBO


4. epilepsy


5. developemental delay


6. hypokalemia





Plan


- cont fluids


- replace lytes


- phos improving


- replace mag


- family discussing GOC


- will follow PRN

## 2019-09-09 NOTE — PN
Progress Note, Physician


Chief Complaint: 





family reports that patient slept better overnight and was less agitated.  

seemed to respond better to the ativan at hs.


will continue this regimen for now.  they offer no other complaints and are 

relieved that patient had a better night.





from previous note.


Spoke to Dr. Shailesh Rodriguez ([place] covering MD) on 9/5/2019.  He is currently 

covering for Dr Segal.  Spoke to him in presence of  Shelly regarding Mrs Wearing hospitalization, surgery notes as well as 

current status.  made him aware that we will need legal paperwork if patient 

was to be made a dnr/dni or if consents need to be sign for this patient.  Dr. Shailesh Rodriguez informed me that he will be reaching out to the  and will ask 

them to either call me or social work.





 


History of Present Illness: 





Patient is a 59 year old female (resident of [facility]) with a PMHx 

significant for profound mental retardation, cerebral palsy, congenital 

quadriplegia, severe scoliosis, seizure disorder, and GERD.  Transferred from 

[place] to [facility] for further management of SBO, sepsis and tachycardia.  





palliative care notes reviewed and noted that if patient is to be made a dnr, 

there are forms that need to be filled out and special criteria listed.  There 

are 9 siblings as well as MHLS that would have to be involved.  


patient is a Full Code.


 


chest xray: mild increase in pulmonary congestion.  will give lasix 20mg x 1.  


she has mild edema of bilateral feet and labial swelling.





overnight had a low grade temp and elevated BP





Patient has been on clinimax since 9/1/19.  albumin 1.7.  defer to renal to 

consider starting TPN if prolonged NPO.








- Current Medication List


Current Medications: 


Active Medications





Acetaminophen (Ofirmev Injection -)  500 mg IVPB Q6H PRN


   PRN Reason: PAIN OR FEVER


   Last Admin: 09/09/19 03:22 Dose:  500 mg


Albuterol/Ipratropium (Duoneb -)  1 amp NEB RQ4H [facility]


   Last Admin: 09/09/19 04:02 Dose:  Not Given


Metronidazole (Flagyl 500mg Premixed Ivpb -)  500 mg in 100 mls @ 100 mls/hr 

IVPB Q8H [facility]


   Last Admin: 09/09/19 04:22 Dose:  100 mls/hr


Piperacillin Sod/Tazobactam (Sod 3.375 gm/ Dextrose)  50 mls @ 100 mls/hr IVPB 

Q8H [facility]; Protocol


   Last Admin: 09/09/19 03:27 Dose:  100 mls/hr


Potassium Chloride 20 meq/ (Amino Acids)  1,010 mls @ 60 mls/hr IVPB Q12H [facility]


   Last Admin: 09/08/19 20:28 Dose:  60 mls/hr


Fat Emulsion Intravenous (Intralipid -)  250 mls @ 20.833 mls/hr IV Q2D@2200 [facility]


   Last Admin: 09/07/19 23:14 Dose:  20.833 mls/hr


Ketorolac Tromethamine (Toradol Injection -)  15 mg IVPUSH Q6H PRN


   PRN Reason: PAIN LEVEL 4 - 6


   Stop: 09/09/19 15:23


   Last Admin: 09/09/19 02:01 Dose:  15 mg


Levetiracetam (Keppra Injection -)  500 mg IVPB BID [facility]


   Last Admin: 09/08/19 22:56 Dose:  500 mg


Lorazepam (Ativan Injection -)  0.25 mg IVPUSH Q6H PRN


   PRN Reason: ANXIETY


   Last Admin: 09/07/19 22:42 Dose:  0.25 mg


Metoclopramide HCl (Reglan Injection -)  10 mg IVPUSH Q6H PRN


   PRN Reason: NAUSEA AND/OR VOMITING


   Last Admin: 09/04/19 03:22 Dose:  10 mg


Morphine Sulfate (Morphine Sulfate)  1 mg IVPUSH Q3H PRN


   PRN Reason: PAIN LEVEL 6-10


   Last Admin: 09/09/19 06:47 Dose:  1 mg


Nystatin (Nystop Powder -)  1 applic TP BID [facility]


   Last Admin: 09/08/19 22:59 Dose:  1 applic


Ondansetron HCl (Zofran Injection)  4 mg IVPUSH Q6H PRN


   PRN Reason: NAUSEA


   Last Admin: 09/03/19 23:36 Dose:  4 mg


Pantoprazole Sodium (Protonix Iv)  40 mg IVPUSH DAILY [facility]


   Last Admin: 09/08/19 11:51 Dose:  40 mg


Scopolamine HBr (Transderm-Scop -)  1 patch TD Q72H [facility]


   Last Admin: 09/07/19 23:48 Dose:  1 patch











- Objective


Vital Signs: 


 Vital Signs











Temperature  97.9 F   09/09/19 06:00


 


Pulse Rate  120 H  09/09/19 06:00


 


Respiratory Rate  22 H  09/09/19 06:00


 


Blood Pressure  148/96   09/09/19 06:00


 


O2 Sat by Pulse Oximetry (%)  100   09/08/19 21:00











Constitutional: Yes: Calm


Eyes: Yes: WNL


HENT: Yes: Atraumatic


Neck: Yes: Supple


Cardiovascular: Yes: Tachycardia


Respiratory: Yes: Rhonchi


Gastrointestinal: Yes: Other (NGT to low intermittent suction.)


Extremities: Yes: Deformity


Edema: Yes


Edema: LLE: 1+, RLE: 1+


Integumentary: Yes: Erythema


Neurological: Yes: Alert


Psychiatric: Yes: Alert


Labs: 


 CBC, BMP





 09/09/19 07:00 





 INR, PTT











INR  1.00  (0.82-1.09)   08/22/19  17:22    














- ....Imaging


Chest X-ray: Report Reviewed





Problem List





- Problems


(1) SBO (small bowel obstruction)


Assessment/Plan: 


NGT to low intermittent suction, still draining apx 100-150cc overnight.  

hypoactive bowel sounds.


NGT was removed on 8/31/2019 but had to be placed back emergently on 9/2/19 as 

patient had vomiting and worsening pain with respiratory distress and vomiting.

  monitor drain output daily.  maintain with NGT. surgery following


Code(s): K56.609 - UNSP INTESTNL OBST, UNSP AS TO PARTIAL VERSUS COMPLETE OBST 

  





(2) Sepsis


Assessment/Plan: 


on zosyn, and flagyl per ID.  


id following


Code(s): A41.9 - SEPSIS, UNSPECIFIED ORGANISM   





(3) Aspiration into airway


Assessment/Plan: 


chest xray shows mild increase in pulmonary congestion.  will give lasix 20mg x 

1.  continue on support with 2 liters of nasal cannula to ease work of 

breathing. 


on allbuterol scheduled for acute shortness of breath.  maintain oxygen levels 

above 92% is goal


Code(s): T17.908A - UNSP FB IN RESP TRACT, PART UNSP CAUSING OTH INJURY, INIT   





(4) Iron deficiency anemia


Assessment/Plan: 


s/p 1 unit of prbc with appropriate response.   


monitor cbc daily.  


Code(s): D50.9 - IRON DEFICIENCY ANEMIA, UNSPECIFIED   





(5) Decreased oral intake


Assessment/Plan: 


NPO.  on clinimax daily and lipids every other day


dietary following  


if continues to be prolonged NPO, may need to be started on TPN.


Code(s): R63.8 - OTHER SYMPTOMS AND SIGNS CONCERNING FOOD AND FLUID INTAKE   





(6) Seizure


Assessment/Plan: 


on keppra IV BID.


on tegratol in NH, continue keppra iv bid.  once no longer npo, can restart 

tegretol. 





Code(s): R56.9 - UNSPECIFIED CONVULSIONS   





(7) Hypernatremia


Assessment/Plan: 


renal consulted for electrolyte imbalance.  recommendations appreciated. 


Code(s): E87.0 - HYPEROSMOLALITY AND HYPERNATREMIA   





(8) Hyperkalemia


Assessment/Plan: 


resolved.  monitor K daily   


Code(s): E87.5 - HYPERKALEMIA   





(9) Profound mental handicap


Assessment/Plan: 


supportive care.





Code(s): F73 - PROFOUND INTELLECTUAL DISABILITIES   





(10) Tachycardia


Assessment/Plan: 


tachycardia likely in the setting of abdominal pain, anxiety, NGT- improving. 


can d/c tele per cardiology


 


Code(s): R00.0 - TACHYCARDIA, UNSPECIFIED   





(11) Left pulmonary infiltrate on CXR


Assessment/Plan: 


apply oxygen 2 liters.  patient noted to be congested and breathing rate 24s


on zosyn.  schedule duonebs.  on scapolamine patch for increased congestion.


 


Code(s): R91.8 - OTHER NONSPECIFIC ABNORMAL FINDING OF LUNG FIELD   





(12) Low serum phosphorus for age


Assessment/Plan: 


repeat phos levels. has been getting supplementation


repeat daily


Code(s): R79.0 - ABNORMAL LEVEL OF BLOOD MINERAL   





(13) Palliative care status


Assessment/Plan: 


seen by palliative care, notes reviewed.  


Code(s): Z51.5 - ENCOUNTER FOR PALLIATIVE CARE   





(14) Swelling of labia


Assessment/Plan: 


monitor.


Code(s): N94.89 - OTH COND ASSOC W FEMALE GENITAL ORGANS AND MENSTRUAL CYCLE   





(15) Fungal infection of the groin


Assessment/Plan: 


nystatin bid


Code(s): B35.6 - TINEA CRURIS   





(16) Prophylactic measure


Assessment/Plan: 


fen


npo


on clinimax and lipids 


monitor electrolytes


heparin/scds





full code





Code(s): Z29.9 - ENCOUNTER FOR PROPHYLACTIC MEASURES, UNSPECIFIED   





Visit type





- Emergency Visit


Emergency Visit: Yes


ED Registration Date: 08/22/19


Care time: The patient presented to the Emergency Department on the above date 

and was hospitalized for further evaluation of their emergent condition.





- New Patient


This patient is new to me today: No





- Critical Care


Critical Care patient: No





- Discharge Referral


Referred to [facility] No

## 2019-09-10 LAB
ALBUMIN SERPL-MCNC: 1.9 G/DL (ref 3.4–5)
ALP SERPL-CCNC: 175 U/L (ref 45–117)
ALT SERPL-CCNC: < 6 U/L (ref 13–61)
ANION GAP SERPL CALC-SCNC: 11 MMOL/L (ref 8–16)
AST SERPL-CCNC: 24 U/L (ref 15–37)
BASOPHILS # BLD: 0.2 % (ref 0–2)
BILIRUB SERPL-MCNC: 0.7 MG/DL (ref 0.2–1)
BUN SERPL-MCNC: 11.8 MG/DL (ref 7–18)
CALCIUM SERPL-MCNC: 8.4 MG/DL (ref 8.5–10.1)
CHLORIDE SERPL-SCNC: 104 MMOL/L (ref 98–107)
CO2 SERPL-SCNC: 29 MMOL/L (ref 21–32)
CREAT SERPL-MCNC: < 0.2 MG/DL (ref 0.55–1.3)
DEPRECATED RDW RBC AUTO: 15.4 % (ref 11.6–15.6)
EOSINOPHIL # BLD: 0 % (ref 0–4.5)
GLUCOSE SERPL-MCNC: 57 MG/DL (ref 74–106)
HCT VFR BLD CALC: 31.2 % (ref 32.4–45.2)
HGB BLD-MCNC: 9.9 GM/DL (ref 10.7–15.3)
LYMPHOCYTES # BLD: 2.6 % (ref 8–40)
MAGNESIUM SERPL-MCNC: 2.1 MG/DL (ref 1.8–2.4)
MCH RBC QN AUTO: 26.7 PG (ref 25.7–33.7)
MCHC RBC AUTO-ENTMCNC: 31.6 G/DL (ref 32–36)
MCV RBC: 84.4 FL (ref 80–96)
MONOCYTES # BLD AUTO: 3.3 % (ref 3.8–10.2)
NEUTROPHILS # BLD: 93.9 % (ref 42.8–82.8)
PLATELET # BLD AUTO: 258 K/MM3 (ref 134–434)
PMV BLD: 8.7 FL (ref 7.5–11.1)
POTASSIUM SERPLBLD-SCNC: 3.9 MMOL/L (ref 3.5–5.1)
PROT SERPL-MCNC: 5.4 G/DL (ref 6.4–8.2)
RBC # BLD AUTO: 3.7 M/MM3 (ref 3.6–5.2)
SODIUM SERPL-SCNC: 143 MMOL/L (ref 136–145)
WBC # BLD AUTO: 18.2 K/MM3 (ref 4–10)

## 2019-09-10 PROCEDURE — B513ZZA FLUOROSCOPY OF RIGHT JUGULAR VEINS, GUIDANCE: ICD-10-PCS | Performed by: NURSE PRACTITIONER

## 2019-09-10 PROCEDURE — B543ZZA ULTRASONOGRAPHY OF RIGHT JUGULAR VEINS, GUIDANCE: ICD-10-PCS | Performed by: NURSE PRACTITIONER

## 2019-09-10 PROCEDURE — 05HM33Z INSERTION OF INFUSION DEVICE INTO RIGHT INTERNAL JUGULAR VEIN, PERCUTANEOUS APPROACH: ICD-10-PCS | Performed by: NURSE PRACTITIONER

## 2019-09-10 RX ADMIN — PIPERACILLIN SODIUM,TAZOBACTAM SODIUM SCH MLS/HR: 3; .375 INJECTION, POWDER, FOR SOLUTION INTRAVENOUS at 02:33

## 2019-09-10 RX ADMIN — IPRATROPIUM BROMIDE AND ALBUTEROL SULFATE SCH AMP: .5; 3 SOLUTION RESPIRATORY (INHALATION) at 16:17

## 2019-09-10 RX ADMIN — IPRATROPIUM BROMIDE AND ALBUTEROL SULFATE SCH AMP: .5; 3 SOLUTION RESPIRATORY (INHALATION) at 12:17

## 2019-09-10 RX ADMIN — IPRATROPIUM BROMIDE AND ALBUTEROL SULFATE SCH AMP: .5; 3 SOLUTION RESPIRATORY (INHALATION) at 04:10

## 2019-09-10 RX ADMIN — PANTOPRAZOLE SODIUM SCH: 40 INJECTION, POWDER, FOR SOLUTION INTRAVENOUS at 18:07

## 2019-09-10 RX ADMIN — LORAZEPAM PRN MG: 2 INJECTION INTRAMUSCULAR; INTRAVENOUS at 02:32

## 2019-09-10 RX ADMIN — LEVETIRACETAM SCH MG: 100 INJECTION, SOLUTION, CONCENTRATE INTRAVENOUS at 18:05

## 2019-09-10 RX ADMIN — HEPARIN SODIUM SCH UNIT: 5000 INJECTION, SOLUTION INTRAVENOUS; SUBCUTANEOUS at 21:43

## 2019-09-10 RX ADMIN — LEVETIRACETAM SCH: 100 INJECTION, SOLUTION, CONCENTRATE INTRAVENOUS at 21:36

## 2019-09-10 RX ADMIN — IPRATROPIUM BROMIDE AND ALBUTEROL SULFATE SCH AMP: .5; 3 SOLUTION RESPIRATORY (INHALATION) at 08:00

## 2019-09-10 RX ADMIN — POTASSIUM CHLORIDE SCH MLS/HR: 7.46 INJECTION, SOLUTION INTRAVENOUS at 18:04

## 2019-09-10 RX ADMIN — IPRATROPIUM BROMIDE AND ALBUTEROL SULFATE SCH AMP: .5; 3 SOLUTION RESPIRATORY (INHALATION) at 00:15

## 2019-09-10 RX ADMIN — MORPHINE SULFATE PRN MG: 2 INJECTION, SOLUTION INTRAMUSCULAR; INTRAVENOUS at 03:02

## 2019-09-10 RX ADMIN — IPRATROPIUM BROMIDE AND ALBUTEROL SULFATE SCH AMP: .5; 3 SOLUTION RESPIRATORY (INHALATION) at 20:40

## 2019-09-10 RX ADMIN — POTASSIUM CHLORIDE SCH MLS/HR: 149 INJECTION, SOLUTION, CONCENTRATE INTRAVENOUS at 00:30

## 2019-09-10 RX ADMIN — LEVETIRACETAM SCH: 100 INJECTION, SOLUTION, CONCENTRATE INTRAVENOUS at 18:06

## 2019-09-10 RX ADMIN — POTASSIUM CHLORIDE SCH MLS/HR: 149 INJECTION, SOLUTION, CONCENTRATE INTRAVENOUS at 21:42

## 2019-09-10 RX ADMIN — LORAZEPAM SCH: 2 INJECTION INTRAMUSCULAR; INTRAVENOUS at 23:01

## 2019-09-10 RX ADMIN — PANTOPRAZOLE SODIUM SCH MG: 40 INJECTION, POWDER, FOR SOLUTION INTRAVENOUS at 18:03

## 2019-09-10 RX ADMIN — MORPHINE SULFATE PRN MG: 2 INJECTION, SOLUTION INTRAMUSCULAR; INTRAVENOUS at 18:04

## 2019-09-10 RX ADMIN — MORPHINE SULFATE PRN MG: 2 INJECTION, SOLUTION INTRAMUSCULAR; INTRAVENOUS at 05:36

## 2019-09-10 RX ADMIN — NYSTATIN SCH APPLIC: 100000 POWDER TOPICAL at 10:27

## 2019-09-10 RX ADMIN — POTASSIUM CHLORIDE SCH: 149 INJECTION, SOLUTION, CONCENTRATE INTRAVENOUS at 19:26

## 2019-09-10 RX ADMIN — PIPERACILLIN SODIUM,TAZOBACTAM SODIUM SCH: 3; .375 INJECTION, POWDER, FOR SOLUTION INTRAVENOUS at 19:12

## 2019-09-10 RX ADMIN — HEPARIN SODIUM SCH UNIT: 5000 INJECTION, SOLUTION INTRAVENOUS; SUBCUTANEOUS at 09:31

## 2019-09-10 RX ADMIN — PIPERACILLIN SODIUM,TAZOBACTAM SODIUM SCH MLS/HR: 3; .375 INJECTION, POWDER, FOR SOLUTION INTRAVENOUS at 18:03

## 2019-09-10 RX ADMIN — POTASSIUM CHLORIDE SCH: 7.46 INJECTION, SOLUTION INTRAVENOUS at 18:05

## 2019-09-10 RX ADMIN — POTASSIUM CHLORIDE SCH MLS/HR: 7.46 INJECTION, SOLUTION INTRAVENOUS at 19:41

## 2019-09-10 RX ADMIN — NYSTATIN SCH APPLIC: 100000 POWDER TOPICAL at 21:44

## 2019-09-10 NOTE — HOSP
Subjective





- Review of Symptoms


Events since last encounter: 


 patient on Clinimad and lipids, however noted with leaking from TLC, patient 

repositioned,  cleaned and redressed x2, still noted with leaking, will hold 

now until 8 am, PMD to follow up in AM, can given morphine, Ativan as ordered.











Subjective: 


 unable to obtain








Physical Examination


Vital Signs: 


 Vital Signs











Temperature  99.2 F   09/09/19 18:00


 


Pulse Rate  132 H  09/09/19 18:00


 


Respiratory Rate  28 H  09/09/19 21:00


 


Blood Pressure  144/78   09/09/19 18:00


 


O2 Sat by Pulse Oximetry (%)  98   09/09/19 22:00











Constitutional: Yes: Anxious


Eyes: Yes: EOM Intact


HENT: Yes: Atraumatic


Neck: Yes: Supple


Cardiovascular: Yes: Regular Rate and Rhythm


Respiratory: Yes: CTA Bilaterally


Gastrointestinal: Yes: Normal Bowel Sounds, Soft


Labs: 


 CBC, BMP





 09/09/19 07:00 





 09/09/19 07:00 











Hospitalist Encounter


Assessment: 





# Leaking TLC access 


#Decreased oral intake


NPO, on clinimax daily and lipids ( noted TLC leaking)


- will hold until 8 am


- PMD to follow in AM 


- dietary following

## 2019-09-10 NOTE — PN
Progress Note, Physician


History of Present Illness: 





Pt seen and examined at bedside. She is not verbal. She appears to be in mild 

distress. 





- Current Medication List


Current Medications: 


Active Medications





Albuterol/Ipratropium (Duoneb -)  1 amp NEB RQ4H [facility]


   Last Admin: 09/10/19 08:00 Dose:  1 amp


Heparin Sodium (Porcine) (Heparin -)  5,000 unit SQ BID [facility]


   Last Admin: 09/10/19 09:31 Dose:  5,000 unit


Metronidazole (Flagyl 500mg Premixed Ivpb -)  500 mg in 100 mls @ 100 mls/hr 

IVPB Q8H [facility]


   Last Admin: 09/10/19 02:32 Dose:  100 mls/hr


Piperacillin Sod/Tazobactam (Sod 3.375 gm/ Dextrose)  50 mls @ 100 mls/hr IVPB 

Q8H [facility]; Protocol


   Last Admin: 09/10/19 02:33 Dose:  100 mls/hr


Fat Emulsion Intravenous (Intralipid -)  250 mls @ 20.833 mls/hr IV Q2D@2200 [facility]


   Last Admin: 09/09/19 21:12 Dose:  20.833 mls/hr


Potassium Chloride 20 meq/ (Amino Acids)  1,010 mls @ 50 mls/hr IVPB Q12H [facility]


   Last Admin: 09/10/19 00:30 Dose:  50 mls/hr


Ketorolac Tromethamine (Toradol Injection -)  15 mg IVPUSH Q6H PRN


   PRN Reason: PAIN LEVEL 4 - 6


   Stop: 09/14/19 19:41


   Last Admin: 09/09/19 22:38 Dose:  15 mg


Levetiracetam (Keppra Injection -)  500 mg IVPB BID [facility]


   Last Admin: 09/09/19 21:11 Dose:  500 mg


Lorazepam (Ativan Injection -)  0.25 mg IVPUSH Q6H PRN


   PRN Reason: ANXIETY


   Last Admin: 09/10/19 02:32 Dose:  0.25 mg


Lorazepam (Ativan Injection -)  0.25 mg IVPUSH DAILY@2300 [facility]


Metoclopramide HCl (Reglan Injection -)  10 mg IVPUSH Q6H PRN


   PRN Reason: NAUSEA AND/OR VOMITING


   Last Admin: 09/04/19 03:22 Dose:  10 mg


Morphine Sulfate (Morphine Sulfate)  1 mg IVPUSH Q3H PRN


   PRN Reason: PAIN LEVEL 6-10


   Last Admin: 09/10/19 05:36 Dose:  1 mg


Nystatin (Nystop Powder -)  1 applic TP BID [facility]


   Last Admin: 09/09/19 21:12 Dose:  1 applic


Ondansetron HCl (Zofran Injection)  4 mg IVPUSH Q6H PRN


   PRN Reason: NAUSEA


   Last Admin: 09/03/19 23:36 Dose:  4 mg


Pantoprazole Sodium (Protonix Iv)  40 mg IVPUSH DAILY [facility]


   Last Admin: 09/09/19 10:16 Dose:  40 mg


Scopolamine HBr (Transderm-Scop -)  1 patch TD Q72H [facility]


   Last Admin: 09/07/19 23:48 Dose:  1 patch











- Objective


Vital Signs: 


 Vital Signs











Temperature  99.8 F H  09/10/19 09:40


 


Pulse Rate  144 H  09/10/19 09:40


 


Respiratory Rate  35 H  09/10/19 09:40


 


Blood Pressure  126/73   09/10/19 09:40


 


O2 Sat by Pulse Oximetry (%)  98   09/10/19 09:00











Constitutional: Yes: Mild Distress


Eyes: Yes: Conjunctiva Clear


Cardiovascular: Yes: S1, S2


Respiratory: Yes: On Nasal O2


Gastrointestinal: Yes: Other (ng tube)


Genitourinary: Yes: Incontinence


Musculoskeletal: Yes: Muscle Weakness, Other (contracted)


Edema: No


Neurological: Yes: Pre-Existing Deficit


Labs: 


 CBC, BMP





 09/09/19 07:00 





 09/09/19 07:00 





 INR, PTT











INR  1.00  (0.82-1.09)   08/22/19  17:22    














Problem List





- Problems


(1) Hyperkalemia


Code(s): E87.5 - HYPERKALEMIA   





(2) Hypernatremia


Code(s): E87.0 - HYPEROSMOLALITY AND HYPERNATREMIA   





(3) SBO (small bowel obstruction)


Code(s): K56.609 - UNSP INTESTNL OBST, UNSP AS TO PARTIAL VERSUS COMPLETE OBST 

  





(4) Seizure


Code(s): R56.9 - UNSPECIFIED CONVULSIONS   





Assessment/Plan


 Current Medications











Generic Name Dose Route Start Last Admin





  Trade Name Freq  PRN Reason Stop Dose Admin


 


Albuterol/Ipratropium  1 amp  09/08/19 13:15  09/10/19 08:00





  Duoneb -  NEB   1 amp





  RQ4H [facility]   Administration





     





     





     





     


 


Heparin Sodium (Porcine)  5,000 unit  09/09/19 10:00  09/10/19 09:31





  Heparin -  SQ   5,000 unit





  BID [facility]   Administration





     





     





     





     


 


Metronidazole  500 mg in 100 mls @ 100 mls/hr  09/01/19 19:30  09/10/19 02:32





  Flagyl 500mg Premixed Ivpb -  IVPB   100 mls/hr





  Q8H [facility]   Administration





     





     





     





     


 


Piperacillin Sod/Tazobactam  50 mls @ 100 mls/hr  09/01/19 19:30  09/10/19 02:33





  Sod 3.375 gm/ Dextrose  IVPB   100 mls/hr





  Q8H [facility]   Administration





     





     





  Protocol   





     


 


Fat Emulsion Intravenous  250 mls @ 20.833 mls/hr  09/05/19 22:00  09/09/19 21:

12





  Intralipid -  IV   20.833 mls/hr





  Q2D@2200 [facility]   Administration





     





     





     





     


 


Potassium Chloride 20 meq/  1,010 mls @ 50 mls/hr  09/10/19 00:27  09/10/19 00:

30





  Amino Acids  IVPB   50 mls/hr





  Q12H [facility]   Administration





     





     





     





     


 


Ketorolac Tromethamine  15 mg  09/09/19 19:42  09/09/19 22:38





  Toradol Injection -  IVPUSH  09/14/19 19:41  15 mg





  Q6H PRN   Administration





  PAIN LEVEL 4 - 6   





     





     





     


 


Levetiracetam  500 mg  09/01/19 22:00  09/09/19 21:11





  Keppra Injection -  IVPB   500 mg





  BID ROSE MARIE   Administration





     





     





     





     


 


Lorazepam  0.25 mg  09/07/19 09:05  09/10/19 02:32





  Ativan Injection -  IVPUSH   0.25 mg





  Q6H PRN   Administration





  ANXIETY   





     





     





     


 


Lorazepam  0.25 mg  09/10/19 23:00  





  Ativan Injection -  IVPUSH   





  DAILY@2300 [facility]   





     





     





     





     


 


Metoclopramide HCl  10 mg  09/01/19 18:55  09/04/19 03:22





  Reglan Injection -  IVPUSH   10 mg





  Q6H PRN   Administration





  NAUSEA AND/OR VOMITING   





     





     





     


 


Morphine Sulfate  1 mg  09/08/19 13:06  09/10/19 05:36





  Morphine Sulfate  IVPUSH   1 mg





  Q3H PRN   Administration





  PAIN LEVEL 6-10   





     





     





     


 


Nystatin  1 applic  09/07/19 10:00  09/09/19 21:12





  Nystop Powder -  TP   1 applic





  BID ROSE MARIE   Administration





     





     





     





     


 


Ondansetron HCl  4 mg  09/01/19 18:55  09/03/19 23:36





  Zofran Injection  IVPUSH   4 mg





  Q6H PRN   Administration





  NAUSEA   





     





     





     


 


Pantoprazole Sodium  40 mg  09/05/19 10:00  09/09/19 10:16





  Protonix Iv  IVPUSH   40 mg





  DAILY ROSE MARIE   Administration





     





     





     





     


 


Scopolamine HBr  1 patch  09/04/19 23:00  09/07/19 23:48





  Transderm-Scop -  TD   1 patch





  Q72H ROSE MARIE   Administration





     





     





     





     











Impression


1. hypernatremia


2. hyperkalemia - resolved


3. SBO


4. epilepsy


5. developemental delay


6. hypokalemia





Plan


- no new labs


- primary team achieving iv access


- monitor lytes


- discussed with medical team


- surgery follow up

## 2019-09-10 NOTE — CON.PULM
Consult


Consult Specialty:: PULM/CCM 


Referred by:: HONEY


Reason for Consultation:: SOB





- History of Present Illness


Chief Complaint: abdominal distention


History of Present Illness: 


59 F, developmental delay, epilepsy, GERD, scoliosis, and total care. Admitted 

via the ER due to abdominal distention.  Diagnosed and being managed for SBO.  





Patient is non-verbal and be bound at baseline so she cannot provide any 

history. 





CXR & Abdominal xrays reviewed: significant bowel distention / no free air / 

bibasilar atelectasis and layering pleural effusions. 





TLC was recently adjusted and the most recent CXR reveals no PTX and the 

catheter appears to be in the SVC.  There is no PTX. 





The TLC is OK for use. 











- History Source


History Provided By: Medical Record


Limitations to Obtaining History: Clinical Condition





- Past Medical History


CNS: Yes: Seizure, Other (developemental delay)


Pulmonary: Yes: Pneumonia.  No: Asthma, Bronchitis, Cancer, COPD, O2 Dependent, 

Pulmonary Embolus, Pulmonary Fibrosis, Sleep Apnea


Gastrointestinal: Yes: GERD





- Alcohol/Substance Use


Hx Alcohol Use: No





- Smoking History


Smoking history: Never smoked


Have you smoked in the past 12 months: No


Aproximately how many cigarettes per day: 0





Home Medications





- Allergies


Allergies/Adverse Reactions: 


 Allergies











Allergy/AdvReac Type Severity Reaction Status Date / Time


 


phenytoin sodium AdvReac Mild  Verified 08/22/19 18:36





[From Dilantin]     


 


phenytoin sodium extended AdvReac Mild  Verified 08/22/19 18:36





[From Dilantin]     


 


SEAFOOD Allergy   Uncoded 08/26/19 08:10


 


sellfish Allergy   Uncoded 08/22/19 18:36














- Home Medications


Home Medications: 


Ambulatory Orders





Carbamazepine Xr [Tegretol Xr -] 400 mg PO AM 07/24/14 


Polyethylene Glycol 3350 [Miralax 119 gm Btl -] 17 gm PO DAILY 07/24/14 


Albuterol 0.083% Nebulizer Sol [Ventolin 0.083%] 1 neb NEB Q4H PRN 08/22/19 


Calcium Carbonate/Vitamin D3 [Oyster Shell 500-Vit D3 200 Tb] 1 each PO BID 08/ 22/19 


Carbamazepine [Tegretol -] 200 mg PO HS 08/22/19 


Famotidine [Pepcid] 40 mg PO BID 08/22/19 


Fexofenadine HCl [Allegra Allergy] 30 mg PO BID 08/22/19 


Pedi Multivit 158/Iron/Vit K1 [Cerovite Jr] 1 each PO DAILY 08/22/19 


Simethicone 40 mg PO Q4H 08/22/19 


Wheat Dextrin [Benefiber] 1 each PO DAILY 08/22/19 











Review of Systems


Unable to obtain ROS, reason: cannot provide 





Physical Exam


Vital Sings: 


 Vital Signs











Temperature  99.8 F H  09/10/19 09:40


 


Pulse Rate  144 H  09/10/19 09:40


 


Respiratory Rate  35 H  09/10/19 09:40


 


Blood Pressure  126/73   09/10/19 09:40


 


O2 Sat by Pulse Oximetry (%)  98   09/10/19 09:00











Constitutional: Yes: Mild Distress, Thin


Eyes: Yes: Conjunctiva Clear, EOM Intact


HENT: Yes: Atraumatic


Neck: Yes: Supple, Trachea Midline


Cardiovascular: Yes: Tachycardia


Respiratory: Yes: Diminished, On Nasal O2, Rales, Rhonchi, SOB, SOB on Exertion

, Tachypnea.  No: Stridor, Wheezes


...Inspection: Yes: Scoliosis


...Clubbing: No


Gastrointestinal: Yes: Distention, Hypoactive Bowel Sounds.  No: Palpable Mass, 

Pulsatile Mass


Musculoskeletal: Yes: WNL


Extremities: Yes: WNL


Edema: No


Peripheral Pulses WNL: Yes


Integumentary: Yes: WNL


Neurological: Yes: Pre-Existing Deficit


Labs: 


 CBC, BMP





 09/09/19 07:00 





 09/09/19 07:00 











Imaging





- Results


Chest X-ray: Report Reviewed, Image Reviewed





Problem List





- Problems


(1) Shortness of breath


Code(s): R06.02 - SHORTNESS OF BREATH   





(2) Atelectasis of both lungs


Code(s): J98.11 - ATELECTASIS   





(3) Pleural effusion


Code(s): J90 - PLEURAL EFFUSION, NOT ELSEWHERE CLASSIFIED   





(4) Hypokalemia


Code(s): E87.6 - HYPOKALEMIA   





(5) Hyponatremia


Code(s): E87.1 - HYPO-OSMOLALITY AND HYPONATREMIA   





(6) Iron deficiency anemia


Code(s): D50.9 - IRON DEFICIENCY ANEMIA, UNSPECIFIED   





(7) Profound mental handicap


Code(s): F73 - PROFOUND INTELLECTUAL DISABILITIES   





(8) SBO (small bowel obstruction)


Code(s): K56.609 - UNSP INTESTNL OBST, UNSP AS TO PARTIAL VERSUS COMPLETE OBST 

  





(9) Seizure


Code(s): R56.9 - UNSPECIFIED CONVULSIONS   





(10) Tachycardia


Code(s): R00.0 - TACHYCARDIA, UNSPECIFIED   





(11) Cerebral palsy


Code(s): G80.9 - CEREBRAL PALSY, UNSPECIFIED   





(12) Cough


Code(s): R05 - COUGH   





(13) Decreased oral intake


Code(s): R63.8 - OTHER SYMPTOMS AND SIGNS CONCERNING FOOD AND FLUID INTAKE   





Assessment/Plan


IMP: 


Suspect etiology of the tachypnea is related to the SBO.  She does have some 

small pleural effusions with associated atelectasis. 








PLAN: 


Continue NGT to LWS 


Noted clinimix


NPO 


Aspiration precautions 


Patient unable to perform Incentive Spirometry 


ABX per ID


No indication for thoracentesis at this point  


Patient would be very high risk for surgical intervention 


Would Not utilize NIPPV support as this will insuflate her stomach and worsen 

her overall condition





Will follow 





Thank you. 





Dr Enamorado

## 2019-09-10 NOTE — PN
Progress Note, Physician


Chief Complaint: 





tachypnea





central line adjusted.


repeat CXR reviewed: rotated film. Possible right sided infiltrate.


History of Present Illness: 





NGT in place.





- Current Medication List


Current Medications: 


Active Medications





Albuterol/Ipratropium (Duoneb -)  1 amp NEB RQ4H [facility]


   Last Admin: 09/10/19 08:00 Dose:  1 amp


Heparin Sodium (Porcine) (Heparin -)  5,000 unit SQ BID [facility]


   Last Admin: 09/10/19 09:31 Dose:  5,000 unit


Metronidazole (Flagyl 500mg Premixed Ivpb -)  500 mg in 100 mls @ 100 mls/hr 

IVPB Q8H [facility]


   Last Admin: 09/10/19 02:32 Dose:  100 mls/hr


Piperacillin Sod/Tazobactam (Sod 3.375 gm/ Dextrose)  50 mls @ 100 mls/hr IVPB 

Q8H [facility]; Protocol


   Last Admin: 09/10/19 02:33 Dose:  100 mls/hr


Fat Emulsion Intravenous (Intralipid -)  250 mls @ 20.833 mls/hr IV Q2D@2200 [facility]


   Last Admin: 09/09/19 21:12 Dose:  20.833 mls/hr


Potassium Chloride 20 meq/ (Amino Acids)  1,010 mls @ 50 mls/hr IVPB Q12H [facility]


   Last Admin: 09/10/19 00:30 Dose:  50 mls/hr


Ketorolac Tromethamine (Toradol Injection -)  15 mg IVPUSH Q6H PRN


   PRN Reason: PAIN LEVEL 4 - 6


   Stop: 09/14/19 19:41


   Last Admin: 09/09/19 22:38 Dose:  15 mg


Levetiracetam (Keppra Injection -)  500 mg IVPB BID [facility]


   Last Admin: 09/09/19 21:11 Dose:  500 mg


Lorazepam (Ativan Injection -)  0.25 mg IVPUSH Q6H PRN


   PRN Reason: ANXIETY


   Last Admin: 09/10/19 02:32 Dose:  0.25 mg


Lorazepam (Ativan Injection -)  0.25 mg IVPUSH DAILY@2300 [facility]


Metoclopramide HCl (Reglan Injection -)  10 mg IVPUSH Q6H PRN


   PRN Reason: NAUSEA AND/OR VOMITING


   Last Admin: 09/04/19 03:22 Dose:  10 mg


Morphine Sulfate (Morphine Sulfate)  1 mg IVPUSH Q3H PRN


   PRN Reason: PAIN LEVEL 6-10


   Last Admin: 09/10/19 05:36 Dose:  1 mg


Nystatin (Nystop Powder -)  1 applic TP BID [facility]


   Last Admin: 09/09/19 21:12 Dose:  1 applic


Ondansetron HCl (Zofran Injection)  4 mg IVPUSH Q6H PRN


   PRN Reason: NAUSEA


   Last Admin: 09/03/19 23:36 Dose:  4 mg


Pantoprazole Sodium (Protonix Iv)  40 mg IVPUSH DAILY [facility]


   Last Admin: 09/09/19 10:16 Dose:  40 mg


Scopolamine HBr (Transderm-Scop -)  1 patch TD Q72H [facility]


   Last Admin: 09/07/19 23:48 Dose:  1 patch











- Objective


Vital Signs: 


 Vital Signs











Temperature  99.8 F H  09/10/19 09:40


 


Pulse Rate  144 H  09/10/19 09:40


 


Respiratory Rate  35 H  09/10/19 09:40


 


Blood Pressure  126/73   09/10/19 09:40


 


O2 Sat by Pulse Oximetry (%)  98   09/10/19 09:00











Cardiovascular: Yes: Tachycardia


Respiratory: Yes: Other (b/l rhonchi)


Gastrointestinal: Yes: Soft


Edema: No


Neurological: Yes: Other (nonverbal)


Labs: 


 CBC, BMP





 09/09/19 07:00 





 09/09/19 07:00 





 INR, PTT











INR  1.00  (0.82-1.09)   08/22/19  17:22    














Assessment/Plan





IMP:


Sinus tachycardia:


- remains mild-mod tachy, likely sec to ongoing GI process


- no med mgmt indicated


- echo unremarkable


- trop neg, no ischemia on EKG - unlikely ACS





SBO:


- manage per surgery, ngt remains in





Seizure disorder:


- manage per primary





Tachypnea: possible R infiltrate


-CXR difficult to interpret, rotated


-Pulm consult for further eval

## 2019-09-10 NOTE — PN
Progress Note, Physician


Chief Complaint: 








patient non-verbal. Family at bedside. Respirations labored and appears to be 

in pain


History of Present Illness: 











History of Present Illness: 





Patient is a 59 year old female (resident of [facility]) with a PMHx 

significant for profound mental retardation, cerebral palsy, congenital 

quadriplegia, severe scoliosis, seizure disorder, and GERD.  Transferred from 

[facility] to [facility] for further management of SBO, sepsis and tachycardia.  





- Current Medication List


Current Medications: 


Active Medications





Albuterol/Ipratropium (Duoneb -)  1 amp NEB RQ4H [facility]


   Last Admin: 09/10/19 04:10 Dose:  1 amp


Heparin Sodium (Porcine) (Heparin -)  5,000 unit SQ BID [facility]


   Last Admin: 09/09/19 21:11 Dose:  5,000 unit


Metronidazole (Flagyl 500mg Premixed Ivpb -)  500 mg in 100 mls @ 100 mls/hr 

IVPB Q8H [facility]


   Last Admin: 09/10/19 02:32 Dose:  100 mls/hr


Piperacillin Sod/Tazobactam (Sod 3.375 gm/ Dextrose)  50 mls @ 100 mls/hr IVPB 

Q8H [facility]; Protocol


   Last Admin: 09/10/19 02:33 Dose:  100 mls/hr


Fat Emulsion Intravenous (Intralipid -)  250 mls @ 20.833 mls/hr IV Q2D@2200 [facility]


   Last Admin: 09/09/19 21:12 Dose:  20.833 mls/hr


Potassium Chloride 20 meq/ (Amino Acids)  1,010 mls @ 50 mls/hr IVPB Q12H [facility]


   Last Admin: 09/10/19 00:30 Dose:  50 mls/hr


Potassium Chloride (Potassium Chloride 10 Meq Premix Ivpb -)  10 meq in 100 mls 

@ 100 mls/hr IVPB Q60M [facility]


   Stop: 09/10/19 09:44


Ketorolac Tromethamine (Toradol Injection -)  15 mg IVPUSH Q6H PRN


   PRN Reason: PAIN LEVEL 4 - 6


   Stop: 09/14/19 19:41


   Last Admin: 09/09/19 22:38 Dose:  15 mg


Levetiracetam (Keppra Injection -)  500 mg IVPB BID [facility]


   Last Admin: 09/09/19 21:11 Dose:  500 mg


Lorazepam (Ativan Injection -)  0.25 mg IVPUSH Q6H [facility]N


   PRN Reason: ANXIETY


   Last Admin: 09/10/19 02:32 Dose:  0.25 mg


Lorazepam (Ativan Injection -)  0.25 mg IVPUSH DAILY@2300 [facility]


Magnesium Sulfate (Magnesium Sulfate)  2 gm IVPB ONCE ONE


   Stop: 09/10/19 07:38


Metoclopramide HCl (Reglan Injection -)  10 mg IVPUSH Q6H PRN


   PRN Reason: NAUSEA AND/OR VOMITING


   Last Admin: 09/04/19 03:22 Dose:  10 mg


Morphine Sulfate (Morphine Sulfate)  1 mg IVPUSH Q3H PRN


   PRN Reason: PAIN LEVEL 6-10


   Last Admin: 09/10/19 05:36 Dose:  1 mg


Nystatin (Nystop Powder -)  1 applic TP BID [facility]


   Last Admin: 09/09/19 21:12 Dose:  1 applic


Ondansetron HCl (Zofran Injection)  4 mg IVPUSH Q6H PRN


   PRN Reason: NAUSEA


   Last Admin: 09/03/19 23:36 Dose:  4 mg


Pantoprazole Sodium (Protonix Iv)  40 mg IVPUSH DAILY [facility]


   Last Admin: 09/09/19 10:16 Dose:  40 mg


Scopolamine HBr (Transderm-Scop -)  1 patch TD Q72H [facility]


   Last Admin: 09/07/19 23:48 Dose:  1 patch











- Objective


Vital Signs: 


 Vital Signs











Temperature  98.7 F   09/10/19 06:00


 


Pulse Rate  143 H  09/10/19 06:00


 


Respiratory Rate  20   09/10/19 06:00


 


Blood Pressure  156/92   09/10/19 06:00


 


O2 Sat by Pulse Oximetry (%)  98   09/09/19 22:00











Additional Findings/Remarks: 





Constitutional: Yes: Calm


Eyes: Yes: WNL


HENT: Yes: Atraumatic


Neck: Yes: Supple


Cardiovascular: Yes: Tachycardia


Respiratory: Yes: Rhonchi


Gastrointestinal: Yes: Other (NGT to low intermittent suction.)


Extremities: Yes: Deformity


Edema: Yes


Edema: LLE: 1+, RLE: 1+


Integumentary: Yes: Erythema


Neurological: Yes: Alert


Psychiatric: Yes: Alert


Labs: 


 CBC, BMP





 09/09/19 07:00 





 09/09/19 07:00 





 INR, PTT











INR  1.00  (0.82-1.09)   08/22/19  17:22    














- ....Imaging


Chest X-ray: Report Reviewed (R IJ appears to be in thoracic outlet. Increased 

congestive changes), Image Reviewed





Problem List





- Problems


(1) SBO (small bowel obstruction)


Assessment/Plan: 


NGT to low intermittent suction, still draining bilious output 


Strictly NPO.


c/w clinimix


Surgery following





Code(s): K56.609 - UNSP INTESTNL OBST, UNSP AS TO PARTIAL VERSUS COMPLETE OBST 

  





(2) Hypokalemia


Assessment/Plan: 


K 3.8, maintaining levels


continue to monitor K, maintain >4.0


c/w clinimix once IV access is achieved


Code(s): E87.6 - HYPOKALEMIA   





(3) Prophylactic measure


Assessment/Plan: 


FEN


NPO


c/w Climinix 


monitor electrolytes





Dispo 


maintain on tele


full code as of this time. Palliative care involved and issue of DNR/DNI  

discussed with family.


discharge planning








DVT


heparin sq bid


Code(s): Z29.9 - ENCOUNTER FOR PROPHYLACTIC MEASURES, UNSPECIFIED   





(4) Seizure


Assessment/Plan: 


c/w keppra 


on tegretol in NH, continue keppra iv bid.  


Code(s): R56.9 - UNSPECIFIED CONVULSIONS   





(5) Sepsis


Assessment/Plan: 


wbc  8.4


Mild effusions with left persistent pulmonary infiltrate on CXR


continue to monitor respiratory status.  


c/w 2 L NC


all cultures NGTD


ID following, cont IV abx (flagyl & zozyn) n


monitor output & temp


Code(s): A41.9 - SEPSIS, UNSPECIFIED ORGANISM   





(6) Aspiration into airway


Assessment/Plan: 


c/w zosyn & flagyl


support with 2 liters of nasal cannula


strictly npo


monitor oxygen levels and titrate off oxygen as tolerated.  not home oxygen 

dependent at home. 


Code(s): T17.908A - UNSP FB IN RESP TRACT, PART UNSP CAUSING OTH INJURY, INIT   





(7) Cerebral palsy


Assessment/Plan: 


supportive care


BL UE severly contracted. IV placed to R AC. IV site is without erythema, not 

tender. IV placement attempted with ultrasound yesterday unsuccessfully and 

family refused to attempt EJ placement. Since IV site is without signs of 

infection and is flushing continue with current IV despite being in for > 72 

hours. Will continue to monitor site. 


Code(s): G80.9 - CEREBRAL PALSY, UNSPECIFIED   





(8) Tachycardia


Assessment/Plan: 


HR remains in 120s despite increased frequency of MSO4


c/w MSO4 1.0mg IV q 6h


most likely related to SBO


 


Code(s): R00.0 - TACHYCARDIA, UNSPECIFIED   





(9) Hyponatremia


Assessment/Plan: 


Na to 142


 will restart Clinimix  when IV access is obtained





Code(s): E87.1 - HYPO-OSMOLALITY AND HYPONATREMIA   





(10) Palliative care status


Assessment/Plan: 


palliative care notes reviewed and noted that if patient is to be made a dnr, 

there are forms that need to be filled out and special criteria listed.  


There are 9 siblings as well as MHLS that would have to be involved.  


Appreciate Palliative care nurse care


patient is a Full Code.


Code(s): Z51.5 - ENCOUNTER FOR PALLIATIVE CARE   





(11) Decreased oral intake


Assessment/Plan: 


 stricftly NPO.  


on clinimax daily and lipids every other day


dietary following  


if continues to be prolonged NPO, may need to be started on TPN.


Code(s): R63.8 - OTHER SYMPTOMS AND SIGNS CONCERNING FOOD AND FLUID INTAKE   





Visit type





- Emergency Visit


Emergency Visit: Yes


ED Registration Date: 08/22/19


Care time: The patient presented to the Emergency Department on the above date 

and was hospitalized for further evaluation of their emergent condition.





- New Patient


This patient is new to me today: No





- Critical Care


Critical Care patient: No





- Discharge Referral


Referred to [facility] No

## 2019-09-10 NOTE — PROC
Central Line Insertion


Indication: Parenteral Nutrition, Poor Venous Access


Risks and Benefits Explained: Yes


Consent on Chart: Yes


Central Line: Triple Lumen Catheter


Anesthesia: 1% Lidocaine


Sterile Technique: Yes


Ultrasound Guided Assistance: Yes


Position: Right Internal Jugular


Post Insertion: Yes: Bilateral  Breath Sounds, Bilateral Chest Expansion, Chest 

X-Ray Ordered


Sterile Dressing Applied: Yes


Remarks: 





Tolerated well.  Blood return from all 3 ports. CXR confirmed placement in SVC. 

OK to use

## 2019-09-11 LAB
ALBUMIN SERPL-MCNC: 1.7 G/DL (ref 3.4–5)
ALP SERPL-CCNC: 134 U/L (ref 45–117)
ALT SERPL-CCNC: < 6 U/L (ref 13–61)
ANION GAP SERPL CALC-SCNC: 6 MMOL/L (ref 8–16)
AST SERPL-CCNC: 19 U/L (ref 15–37)
BASOPHILS # BLD: 0.2 % (ref 0–2)
BILIRUB SERPL-MCNC: 1 MG/DL (ref 0.2–1)
BUN SERPL-MCNC: 17.8 MG/DL (ref 7–18)
CALCIUM SERPL-MCNC: 8.1 MG/DL (ref 8.5–10.1)
CHLORIDE SERPL-SCNC: 104 MMOL/L (ref 98–107)
CO2 SERPL-SCNC: 30 MMOL/L (ref 21–32)
CREAT SERPL-MCNC: < 0.2 MG/DL (ref 0.55–1.3)
DEPRECATED RDW RBC AUTO: 15.4 % (ref 11.6–15.6)
EOSINOPHIL # BLD: 0 % (ref 0–4.5)
GLUCOSE SERPL-MCNC: 106 MG/DL (ref 74–106)
HCT VFR BLD CALC: 28.2 % (ref 32.4–45.2)
HGB BLD-MCNC: 9.2 GM/DL (ref 10.7–15.3)
LYMPHOCYTES # BLD: 5 % (ref 8–40)
MAGNESIUM SERPL-MCNC: 2 MG/DL (ref 1.8–2.4)
MCH RBC QN AUTO: 27.5 PG (ref 25.7–33.7)
MCHC RBC AUTO-ENTMCNC: 32.7 G/DL (ref 32–36)
MCV RBC: 84.2 FL (ref 80–96)
MONOCYTES # BLD AUTO: 4.6 % (ref 3.8–10.2)
NEUTROPHILS # BLD: 90.2 % (ref 42.8–82.8)
PLATELET # BLD AUTO: 250 K/MM3 (ref 134–434)
PMV BLD: 8.7 FL (ref 7.5–11.1)
POTASSIUM SERPLBLD-SCNC: 4.2 MMOL/L (ref 3.5–5.1)
PROT SERPL-MCNC: 4.9 G/DL (ref 6.4–8.2)
RBC # BLD AUTO: 3.35 M/MM3 (ref 3.6–5.2)
SODIUM SERPL-SCNC: 140 MMOL/L (ref 136–145)
WBC # BLD AUTO: 13.8 K/MM3 (ref 4–10)

## 2019-09-11 RX ADMIN — IPRATROPIUM BROMIDE AND ALBUTEROL SULFATE SCH: .5; 3 SOLUTION RESPIRATORY (INHALATION) at 20:20

## 2019-09-11 RX ADMIN — LORAZEPAM PRN MG: 2 INJECTION INTRAMUSCULAR; INTRAVENOUS at 03:54

## 2019-09-11 RX ADMIN — LORAZEPAM PRN MG: 2 INJECTION INTRAMUSCULAR; INTRAVENOUS at 12:22

## 2019-09-11 RX ADMIN — NYSTATIN SCH APPLIC: 100000 POWDER TOPICAL at 12:57

## 2019-09-11 RX ADMIN — IPRATROPIUM BROMIDE AND ALBUTEROL SULFATE SCH AMP: .5; 3 SOLUTION RESPIRATORY (INHALATION) at 04:30

## 2019-09-11 RX ADMIN — LEVETIRACETAM SCH MG: 100 INJECTION, SOLUTION, CONCENTRATE INTRAVENOUS at 09:38

## 2019-09-11 RX ADMIN — HEPARIN SODIUM SCH UNIT: 5000 INJECTION, SOLUTION INTRAVENOUS; SUBCUTANEOUS at 09:39

## 2019-09-11 RX ADMIN — LORAZEPAM SCH: 2 INJECTION INTRAMUSCULAR; INTRAVENOUS at 23:16

## 2019-09-11 RX ADMIN — PIPERACILLIN SODIUM,TAZOBACTAM SODIUM SCH MLS/HR: 3; .375 INJECTION, POWDER, FOR SOLUTION INTRAVENOUS at 20:30

## 2019-09-11 RX ADMIN — IPRATROPIUM BROMIDE AND ALBUTEROL SULFATE SCH AMP: .5; 3 SOLUTION RESPIRATORY (INHALATION) at 08:10

## 2019-09-11 RX ADMIN — PANTOPRAZOLE SODIUM SCH MG: 40 INJECTION, POWDER, FOR SOLUTION INTRAVENOUS at 09:39

## 2019-09-11 RX ADMIN — LEVETIRACETAM SCH MG: 100 INJECTION, SOLUTION, CONCENTRATE INTRAVENOUS at 23:20

## 2019-09-11 RX ADMIN — I.V. FAT EMULSION SCH MLS/HR: 20 EMULSION INTRAVENOUS at 23:20

## 2019-09-11 RX ADMIN — IPRATROPIUM BROMIDE AND ALBUTEROL SULFATE SCH AMP: .5; 3 SOLUTION RESPIRATORY (INHALATION) at 11:30

## 2019-09-11 RX ADMIN — MORPHINE SULFATE PRN MG: 2 INJECTION, SOLUTION INTRAMUSCULAR; INTRAVENOUS at 17:35

## 2019-09-11 RX ADMIN — POTASSIUM CHLORIDE SCH MLS/HR: 149 INJECTION, SOLUTION, CONCENTRATE INTRAVENOUS at 12:59

## 2019-09-11 RX ADMIN — PIPERACILLIN SODIUM,TAZOBACTAM SODIUM SCH MLS/HR: 3; .375 INJECTION, POWDER, FOR SOLUTION INTRAVENOUS at 02:34

## 2019-09-11 RX ADMIN — HEPARIN SODIUM SCH UNIT: 5000 INJECTION, SOLUTION INTRAVENOUS; SUBCUTANEOUS at 23:19

## 2019-09-11 RX ADMIN — IPRATROPIUM BROMIDE AND ALBUTEROL SULFATE SCH: .5; 3 SOLUTION RESPIRATORY (INHALATION) at 15:55

## 2019-09-11 RX ADMIN — NYSTATIN SCH APPLIC: 100000 POWDER TOPICAL at 23:21

## 2019-09-11 RX ADMIN — MORPHINE SULFATE PRN MG: 2 INJECTION, SOLUTION INTRAMUSCULAR; INTRAVENOUS at 09:38

## 2019-09-11 RX ADMIN — IPRATROPIUM BROMIDE AND ALBUTEROL SULFATE SCH AMP: .5; 3 SOLUTION RESPIRATORY (INHALATION) at 00:37

## 2019-09-11 RX ADMIN — POTASSIUM CHLORIDE SCH: 149 INJECTION, SOLUTION, CONCENTRATE INTRAVENOUS at 00:29

## 2019-09-11 RX ADMIN — MORPHINE SULFATE PRN MG: 2 INJECTION, SOLUTION INTRAMUSCULAR; INTRAVENOUS at 02:42

## 2019-09-11 RX ADMIN — MORPHINE SULFATE PRN MG: 2 INJECTION, SOLUTION INTRAMUSCULAR; INTRAVENOUS at 05:55

## 2019-09-11 RX ADMIN — PIPERACILLIN SODIUM,TAZOBACTAM SODIUM SCH MLS/HR: 3; .375 INJECTION, POWDER, FOR SOLUTION INTRAVENOUS at 12:58

## 2019-09-11 RX ADMIN — IPRATROPIUM BROMIDE AND ALBUTEROL SULFATE SCH: .5; 3 SOLUTION RESPIRATORY (INHALATION) at 23:42

## 2019-09-11 NOTE — PN
Progress Note, Physician


History of Present Illness: 





PULMONARY





AWAKE,TACHYPNEIC ON VM





- Current Medication List


Current Medications: 


Active Medications





Acetaminophen (Ofirmev Injection -)  500 mg IVPB Q6H [facility]


   PRN Reason: PAIN OR FEVER


   Last Admin: 09/10/19 18:10 Dose:  500 mg


Albuterol/Ipratropium (Duoneb -)  1 amp NEB RQ4H [facility]


   Last Admin: 09/11/19 08:10 Dose:  1 amp


Heparin Sodium (Porcine) (Heparin -)  5,000 unit SQ BID [facility]


   Last Admin: 09/11/19 09:39 Dose:  5,000 unit


Metronidazole (Flagyl 500mg Premixed Ivpb -)  500 mg in 100 mls @ 100 mls/hr 

IVPB Q8H [facility]


   Last Admin: 09/11/19 12:58 Dose:  100 mls/hr


Piperacillin Sod/Tazobactam (Sod 3.375 gm/ Dextrose)  50 mls @ 100 mls/hr IVPB 

Q8H [facility]; Protocol


   Last Admin: 09/11/19 12:58 Dose:  100 mls/hr


Fat Emulsion Intravenous (Intralipid -)  250 mls @ 20.833 mls/hr IV Q2D@2200 [facility]


   Last Admin: 09/09/19 21:12 Dose:  20.833 mls/hr


Potassium Chloride 20 meq/ (Amino Acids)  1,010 mls @ 50 mls/hr IVPB Q12H [facility]


   Last Admin: 09/11/19 12:59 Dose:  50 mls/hr


Ketorolac Tromethamine (Toradol Injection -)  15 mg IVPUSH Q6H [facility]


   PRN Reason: PAIN LEVEL 4 - 6


   Stop: 09/14/19 19:41


   Last Admin: 09/09/19 22:38 Dose:  15 mg


Levetiracetam (Keppra Injection -)  500 mg IVPB BID [facility]


   Last Admin: 09/11/19 09:38 Dose:  500 mg


Lorazepam (Ativan Injection -)  0.25 mg IVPUSH Q6H [facility]


   PRN Reason: ANXIETY


   Last Admin: 09/11/19 12:22 Dose:  0.25 mg


Lorazepam (Ativan Injection -)  0.25 mg IVPUSH DAILY@2300 [facility]


   Last Admin: 09/10/19 23:01 Dose:  Not Given


Metoclopramide HCl (Reglan Injection -)  10 mg IVPUSH Q6H [facility]


   PRN Reason: NAUSEA AND/OR VOMITING


   Last Admin: 09/04/19 03:22 Dose:  10 mg


Morphine Sulfate (Morphine Sulfate)  1 mg IVPUSH Q3H [facility]


   PRN Reason: PAIN LEVEL 6-10


   Last Admin: 09/11/19 09:38 Dose:  1 mg


Nystatin (Nystop Powder -)  1 applic TP BID [facility]


   Last Admin: 09/11/19 12:57 Dose:  1 applic


Ondansetron HCl (Zofran Injection)  4 mg IVPUSH Q6H [facility]


   PRN Reason: NAUSEA


   Last Admin: 09/03/19 23:36 Dose:  4 mg


Pantoprazole Sodium (Protonix Iv)  40 mg IVPUSH DAILY [facility]


   Last Admin: 09/11/19 09:39 Dose:  40 mg


Scopolamine HBr (Transderm-Scop -)  1 patch TD Q72H [facility]


   Last Admin: 09/07/19 23:48 Dose:  1 patch











- Objective


Vital Signs: 


 Vital Signs











Temperature  99.1 F   09/11/19 08:00


 


Pulse Rate  122 H  09/11/19 08:00


 


Respiratory Rate  18   09/11/19 09:00


 


Blood Pressure  136/82   09/11/19 08:00


 


O2 Sat by Pulse Oximetry (%)  95   09/11/19 09:00











Constitutional: Yes: Moderate Distress, Other (AWAKE)


Eyes: Yes: WNL


HENT: Yes: WNL


Neck: Yes: WNL


Cardiovascular: Yes: Tachycardia, S1, S2


Respiratory: Yes: Rhonchi


Gastrointestinal: Yes: Distention


Extremities: Yes: Shortened, Other (CONTRACTED)


Edema: Yes


Labs: 


 CBC, BMP





 09/11/19 06:00 





 09/11/19 06:00 





 








Assessment/Plan





Problem List





- Problems


(1) Shortness of breath


Code(s): R06.02 - SHORTNESS OF BREATH   





(2) Atelectasis of both lungs


Code(s): J98.11 - ATELECTASIS   





(3) Pleural effusion


Code(s): J90 - PLEURAL EFFUSION, NOT ELSEWHERE CLASSIFIED   





(4) Hypokalemia


Code(s): E87.6 - HYPOKALEMIA   





(5) Hyponatremia


Code(s): E87.1 - HYPO-OSMOLALITY AND HYPONATREMIA   





(6) Iron deficiency anemia


Code(s): D50.9 - IRON DEFICIENCY ANEMIA, UNSPECIFIED   





(7) Profound mental handicap


Code(s): F73 - PROFOUND INTELLECTUAL DISABILITIES   





(8) SBO (small bowel obstruction)


Code(s): K56.609 - UNSP INTESTNL OBST, UNSP AS TO PARTIAL VERSUS COMPLETE OBST 

  





(9) Seizure


Code(s): R56.9 - UNSPECIFIED CONVULSIONS   





(10) Tachycardia


Code(s): R00.0 - TACHYCARDIA, UNSPECIFIED   





(11) Cerebral palsy


Code(s): G80.9 - CEREBRAL PALSY, UNSPECIFIED   





(12) Cough


Code(s): R05 - COUGH   





(13) Decreased oral intake


Code(s): R63.8 - OTHER SYMPTOMS AND SIGNS CONCERNING FOOD AND FLUID INTAKE   





Assessment/Plan


IMP: 


respiratory distress


tachypnea is related to the SBO.  She does have some 


small pleural effusions with associated atelectasis. 








PLAN: 


Continue NGT to LWS 


clinimix


NPO 


Aspiration precautions 


Patient unable to perform Incentive Spirometry 


ABX per ID


No indication for thoracentesis at this point  


Patient would be very high risk for surgical intervention 


Would Not utilize NIPPV support as this will insuflate her stomach and worsen 

her overall condition





DR HAMPTON

## 2019-09-11 NOTE — PN
Progress Note (short form)





- Note


Progress Note: 





Attending Surgeon





This 60 y/o non-verbal resident of [facility] presented to the 

[facility]  ED August 22,2019 with signs and symptoms of intestinal 

obstruction. The patient has a history of an unknown type of previous abdominal 

surgery. She was subsequently transferred to [facility] for on 

going care. Despite multiple efforts to treat her obstruction conservatively 

with nasogastric suction and intravenous fluids and bowel rest in hopes of 

resolution this has not come to fruition; she may have an obstruction as a 

result of a different pathology other than simple adhesions. Despite all 

attempts to define the site nature of the obstruction with various imaging 

techniques various patient specific abnormalities which include her contracted 

state among others preclude this.





By history she has a seizure disorder, GERD, scolioses, a deformed pelvis, 

mental retardation, developmental delay and severe four extremity contracture 

and requires total care 24/7.





It is my expert opinion that any operative intervention in this patient to 

relieve her obstruction would be tantamount to medical futility and would 

expose the patient to high risk for complications; in addition surgery would 

not necessarily change her outcome in a meaningful way as her problem may not 

be amenable to corrective or even pallative surgery and consequently might 

diminish her dignity or quality of time left in her life.





In an attempt to quantify her "risk" the ACS NSQIP Surgical Risk Calculator was 

used; out of 13 potential adverse outcomes that can be measured she was found 

to be above average in risk in all 13 categories including death with an 

average risk in the population of 4.6% and in this patient 45.8%. 





Wyatt Alfaro MD FACS

## 2019-09-11 NOTE — PN
Progress Note (short form)





- Note


Progress Note: 


appears comfortable. nonverbal








 Vital Signs











 Period  Temp  Pulse  Resp  BP Sys/Carlson  Pulse Ox


 


 Last 24 Hr  98.1 F-102.4 F    18-28  101-155/64-83  95-95











Cardiovascular: Yes: Tachycardia


Respiratory: Yes: CTAB (poor effort)


Gastrointestinal: Yes: Soft


Edema: No


Neurological: Yes: Other (nonverbal)


not agitated


no jaundice, diaphoresis





Assessment/Plan





IMP:


Sinus tachycardia:


- remains mild-mod tachy, likely sec to ongoing GI process


- no med mgmt indicated


- echo unremarkable


- trop neg, no ischemia on EKG - unlikely ACS





SBO:


- manage per surgery, ngt remains in





Seizure disorder:


- manage per primary





Tachypnea: possible R infiltrate


-CXR difficult to interpret, rotated


-Pulm consult for further eval

## 2019-09-11 NOTE — PN
Progress Note, Physician


Chief Complaint: 








patient non-verbal. Family at bedside and states she had a restful night


History of Present Illness: 











History of Present Illness: 





Patient is a 59 year old female (resident of [facility]) with a PMHx 

significant for profound mental retardation, cerebral palsy, congenital 

quadriplegia, severe scoliosis, seizure disorder, and GERD.  Transferred from 

[place] to [facility] for further management of SBO, sepsis and tachycardia.  





- Current Medication List


Current Medications: 


Active Medications





Acetaminophen (Ofirmev Injection -)  500 mg IVPB Q6H PRN


   PRN Reason: PAIN OR FEVER


   Last Admin: 09/10/19 18:10 Dose:  500 mg


Albuterol/Ipratropium (Duoneb -)  1 amp NEB RQ4H [facility]


   Last Admin: 09/11/19 04:30 Dose:  1 amp


Heparin Sodium (Porcine) (Heparin -)  5,000 unit SQ BID [facility]


   Last Admin: 09/10/19 21:43 Dose:  5,000 unit


Metronidazole (Flagyl 500mg Premixed Ivpb -)  500 mg in 100 mls @ 100 mls/hr 

IVPB Q8H [facility]


   Last Admin: 09/11/19 02:34 Dose:  100 mls/hr


Piperacillin Sod/Tazobactam (Sod 3.375 gm/ Dextrose)  50 mls @ 100 mls/hr IVPB 

Q8H [facility]; Protocol


   Last Admin: 09/11/19 02:34 Dose:  100 mls/hr


Fat Emulsion Intravenous (Intralipid -)  250 mls @ 20.833 mls/hr IV Q2D@2200 [facility]


   Last Admin: 09/09/19 21:12 Dose:  20.833 mls/hr


Potassium Chloride 20 meq/ (Amino Acids)  1,010 mls @ 50 mls/hr IVPB Q12H [facility]


   Last Admin: 09/11/19 00:29 Dose:  Not Given


Ketorolac Tromethamine (Toradol Injection -)  15 mg IVPUSH Q6H PRN


   PRN Reason: PAIN LEVEL 4 - 6


   Stop: 09/14/19 19:41


   Last Admin: 09/09/19 22:38 Dose:  15 mg


Levetiracetam (Keppra Injection -)  500 mg IVPB BID [facility]


   Last Admin: 09/10/19 21:36 Dose:  Not Given


Lorazepam (Ativan Injection -)  0.25 mg IVPUSH Q6H PRN


   PRN Reason: ANXIETY


   Last Admin: 09/11/19 03:54 Dose:  0.25 mg


Lorazepam (Ativan Injection -)  0.25 mg IVPUSH DAILY@2300 [facility]


   Last Admin: 09/10/19 23:01 Dose:  Not Given


Metoclopramide HCl (Reglan Injection -)  10 mg IVPUSH Q6H PRN


   PRN Reason: NAUSEA AND/OR VOMITING


   Last Admin: 09/04/19 03:22 Dose:  10 mg


Morphine Sulfate (Morphine Sulfate)  1 mg IVPUSH Q3H PRN


   PRN Reason: PAIN LEVEL 6-10


   Last Admin: 09/11/19 05:55 Dose:  1 mg


Nystatin (Nystop Powder -)  1 applic TP BID [facility]


   Last Admin: 09/10/19 21:44 Dose:  1 applic


Ondansetron HCl (Zofran Injection)  4 mg IVPUSH Q6H PRN


   PRN Reason: NAUSEA


   Last Admin: 09/03/19 23:36 Dose:  4 mg


Pantoprazole Sodium (Protonix Iv)  40 mg IVPUSH DAILY [facility]


   Last Admin: 09/10/19 18:03 Dose:  40 mg


Scopolamine HBr (Transderm-Scop -)  1 patch TD Q72H [facility]


   Last Admin: 09/07/19 23:48 Dose:  1 patch











- Objective


Vital Signs: 


 Vital Signs











Temperature  99.6 F   09/11/19 06:00


 


Pulse Rate  86   09/11/19 06:00


 


Respiratory Rate  18   09/11/19 06:00


 


Blood Pressure  101/64   09/11/19 06:00


 


O2 Sat by Pulse Oximetry (%)  95   09/10/19 22:00











Additional Findings/Remarks: 





Constitutional: Yes: Calm


Eyes: Yes: WNL


HENT: Yes: Atraumatic


Neck: Yes: Supple


Cardiovascular: Yes: Tachycardia


Respiratory: Yes: Rhonchi


Gastrointestinal: Yes: Other (NGT to low intermittent suction.)


Extremities: Yes: Deformity. Severe contracture to LE


Edema: Yes


Edema: LLE: 1+, RLE: 1+


Integumentary: Yes: Erythema


Neurological: Yes: Alert


Psychiatric: Yes: Alert


Labs: 


 INR, PTT











INR  1.00  (0.82-1.09)   08/22/19  17:22    














Problem List





- Problems


(1) SBO (small bowel obstruction)


Assessment/Plan: 


NGT to low intermittent suction, still draining bilious output 


Strictly NPO.


no improvement in abdominal xrays, passing stool and flatus 


c/w clinimix


Surgery following, not a surgical candidate, high for for surgical complication 

and or death





Code(s): K56.609 - UNSP INTESTNL OBST, UNSP AS TO PARTIAL VERSUS COMPLETE OBST 

  





(2) Hypokalemia


Assessment/Plan: 


K 3.8, maintaining levels


continue to monitor K, maintain >4.0


c/w clinimix


to start TPN tomorrow


Code(s): E87.6 - HYPOKALEMIA   





(3) Prophylactic measure


Assessment/Plan: 


FEN


NPO


c/w Climinix 


monitor electrolytes





Dispo 


maintain on tele


full code as of this time. Palliative care involved and issue of DNR/DNI  

discussed with family.


discharge planning








DVT


heparin sq bid


Code(s): Z29.9 - ENCOUNTER FOR PROPHYLACTIC MEASURES, UNSPECIFIED   





(4) Seizure


Assessment/Plan: 


c/w keppra 


on tegretol in NH, continue keppra iv bid.  


Code(s): R56.9 - UNSPECIFIED CONVULSIONS   





(5) Sepsis


Assessment/Plan: 


wbc  13.4


Mild effusions with left persistent pulmonary infiltrate on CXR


continue to monitor respiratory status.  


c/w 2 L NC


all cultures NGTD


ID following, cont IV abx (flagyl & zozyn) 


monitor output & temp


Code(s): A41.9 - SEPSIS, UNSPECIFIED ORGANISM   





(6) Aspiration into airway


Assessment/Plan: 


c/w zosyn & flagyl


support with 2 liters of nasal cannula


strictly npo


monitor oxygen levels and titrate off oxygen as tolerated.  not home oxygen 

dependent at home. 


Code(s): T17.908A - UNSP FB IN RESP TRACT, PART UNSP CAUSING OTH INJURY, INIT   





(7) Cerebral palsy


Assessment/Plan: 


supportive care





Code(s): G80.9 - CEREBRAL PALSY, UNSPECIFIED   





(8) Tachycardia


Assessment/Plan: 


HR remains in 120s despite increased frequency of MSO4, periods when HR is in 

80s


c/w MSO4 1.0mg IV q 6h


most likely related to SBO


 


Code(s): R00.0 - TACHYCARDIA, UNSPECIFIED   





(9) Hyponatremia


Assessment/Plan: 


Na to 142


c/w clinimax and then TPN


Code(s): E87.1 - HYPO-OSMOLALITY AND HYPONATREMIA   





(10) Palliative care status


Assessment/Plan: 


palliative care notes reviewed and noted that if patient is to be made a dnr, 

there are forms that need to be filled out and special criteria listed.  


There are 9 siblings as well as MHLS that would have to be involved.  


Appreciate Palliative care nurse care


patient is a Full Code.


Code(s): Z51.5 - ENCOUNTER FOR PALLIATIVE CARE   





(11) Decreased oral intake


Assessment/Plan: 


 strictly NPO.  


on clinimax daily and lipids every other day


dietary following  


TPN to start tomorrow.


Code(s): R63.8 - OTHER SYMPTOMS AND SIGNS CONCERNING FOOD AND FLUID INTAKE   





Visit type





- Emergency Visit


Emergency Visit: Yes


ED Registration Date: 08/22/19


Care time: The patient presented to the Emergency Department on the above date 

and was hospitalized for further evaluation of their emergent condition.





- New Patient


This patient is new to me today: No





- Critical Care


Critical Care patient: No





- Discharge Referral


Referred to [facility] No

## 2019-09-11 NOTE — PN
Progress Note, Physician


History of Present Illness: 





Pt seen and examined at bedside. She appears more comfortable today. 





- Current Medication List


Current Medications: 


Active Medications





Acetaminophen (Ofirmev Injection -)  500 mg IVPB Q6H PRN


   PRN Reason: PAIN OR FEVER


   Last Admin: 09/10/19 18:10 Dose:  500 mg


Albuterol/Ipratropium (Duoneb -)  1 amp NEB RQ4H [facility]


   Last Admin: 09/11/19 04:30 Dose:  1 amp


Heparin Sodium (Porcine) (Heparin -)  5,000 unit SQ BID [facility]


   Last Admin: 09/11/19 09:39 Dose:  5,000 unit


Metronidazole (Flagyl 500mg Premixed Ivpb -)  500 mg in 100 mls @ 100 mls/hr 

IVPB Q8H [facility]


   Last Admin: 09/11/19 02:34 Dose:  100 mls/hr


Piperacillin Sod/Tazobactam (Sod 3.375 gm/ Dextrose)  50 mls @ 100 mls/hr IVPB 

Q8H [facility]; Protocol


   Last Admin: 09/11/19 02:34 Dose:  100 mls/hr


Fat Emulsion Intravenous (Intralipid -)  250 mls @ 20.833 mls/hr IV Q2D@2200 [facility]


   Last Admin: 09/09/19 21:12 Dose:  20.833 mls/hr


Potassium Chloride 20 meq/ (Amino Acids)  1,010 mls @ 50 mls/hr IVPB Q12H [facility]


   Last Admin: 09/11/19 00:29 Dose:  Not Given


Ketorolac Tromethamine (Toradol Injection -)  15 mg IVPUSH Q6H PRN


   PRN Reason: PAIN LEVEL 4 - 6


   Stop: 09/14/19 19:41


   Last Admin: 09/09/19 22:38 Dose:  15 mg


Levetiracetam (Keppra Injection -)  500 mg IVPB BID [facility]


   Last Admin: 09/11/19 09:38 Dose:  500 mg


Lorazepam (Ativan Injection -)  0.25 mg IVPUSH Q6H [facility]


   PRN Reason: ANXIETY


   Last Admin: 09/11/19 03:54 Dose:  0.25 mg


Lorazepam (Ativan Injection -)  0.25 mg IVPUSH DAILY@2300 [facility]


   Last Admin: 09/10/19 23:01 Dose:  Not Given


Metoclopramide HCl (Reglan Injection -)  10 mg IVPUSH Q6H PRN


   PRN Reason: NAUSEA AND/OR VOMITING


   Last Admin: 09/04/19 03:22 Dose:  10 mg


Morphine Sulfate (Morphine Sulfate)  1 mg IVPUSH Q3H PRN


   PRN Reason: PAIN LEVEL 6-10


   Last Admin: 09/11/19 09:38 Dose:  1 mg


Nystatin (Nystop Powder -)  1 applic TP BID [facility]


   Last Admin: 09/10/19 21:44 Dose:  1 applic


Ondansetron HCl (Zofran Injection)  4 mg IVPUSH Q6H PRN


   PRN Reason: NAUSEA


   Last Admin: 09/03/19 23:36 Dose:  4 mg


Pantoprazole Sodium (Protonix Iv)  40 mg IVPUSH DAILY [facility]


   Last Admin: 09/11/19 09:39 Dose:  40 mg


Scopolamine HBr (Transderm-Scop -)  1 patch TD Q72H [facility]


   Last Admin: 09/07/19 23:48 Dose:  1 patch











- Objective


Vital Signs: 


 Vital Signs











Temperature  99.6 F   09/11/19 06:00


 


Pulse Rate  86   09/11/19 06:00


 


Respiratory Rate  18   09/11/19 06:00


 


Blood Pressure  101/64   09/11/19 06:00


 


O2 Sat by Pulse Oximetry (%)  95   09/10/19 22:00











Constitutional: Yes: Calm


Eyes: Yes: Conjunctiva Clear


HENT: Yes: Atraumatic


Neck: Yes: Supple


Cardiovascular: Yes: S1, S2


Respiratory: Yes: On Nasal O2


Gastrointestinal: Yes: Soft, Other (ng tube)


Genitourinary: Yes: Incontinence


Musculoskeletal: Yes: Muscle Weakness


Extremities: Yes: Other (contracted)


Edema: No


Neurological: Yes: Pre-Existing Deficit


Labs: 


 CBC, BMP





 09/11/19 06:00 





 09/11/19 06:00 





 INR, PTT











INR  1.00  (0.82-1.09)   08/22/19  17:22    














Problem List





- Problems


(1) Hyperkalemia


Code(s): E87.5 - HYPERKALEMIA   





(2) Hypernatremia


Code(s): E87.0 - HYPEROSMOLALITY AND HYPERNATREMIA   





(3) SBO (small bowel obstruction)


Code(s): K56.609 - UNSP INTESTNL OBST, UNSP AS TO PARTIAL VERSUS COMPLETE OBST 

  





(4) Seizure


Code(s): R56.9 - UNSPECIFIED CONVULSIONS   





Assessment/Plan


 Current Medications











Generic Name Dose Route Start Last Admin





  Trade Name Freq  PRN Reason Stop Dose Admin


 


Acetaminophen  500 mg  09/10/19 17:18  09/10/19 18:10





  Ofirmev Injection -  IVPB   500 mg





  Q6H PRN   Administration





  PAIN OR FEVER   





     





     





     


 


Albuterol/Ipratropium  1 amp  09/08/19 13:15  09/11/19 04:30





  Duoneb -  NEB   1 amp





  RQ4H [facility]   Administration





     





     





     





     


 


Heparin Sodium (Porcine)  5,000 unit  09/09/19 10:00  09/11/19 09:39





  Heparin -  SQ   5,000 unit





  BID [facility]   Administration





     





     





     





     


 


Metronidazole  500 mg in 100 mls @ 100 mls/hr  09/01/19 19:30  09/11/19 02:34





  Flagyl 500mg Premixed Ivpb -  IVPB   100 mls/hr





  Q8H [facility]   Administration





     





     





     





     


 


Piperacillin Sod/Tazobactam  50 mls @ 100 mls/hr  09/01/19 19:30  09/11/19 02:34





  Sod 3.375 gm/ Dextrose  IVPB   100 mls/hr





  Q8H [facility]   Administration





     





     





  Protocol   





     


 


Fat Emulsion Intravenous  250 mls @ 20.833 mls/hr  09/05/19 22:00  09/09/19 21:

12





  Intralipid -  IV   20.833 mls/hr





  Q2D@2200 [facility]   Administration





     





     





     





     


 


Potassium Chloride 20 meq/  1,010 mls @ 50 mls/hr  09/10/19 00:27  09/11/19 00:

29





  Amino Acids  IVPB   Not Given





  Q12H [facility]   





     





     





     





     


 


Ketorolac Tromethamine  15 mg  09/09/19 19:42  09/09/19 22:38





  Toradol Injection -  IVPUSH  09/14/19 19:41  15 mg





  Q6H PRN   Administration





  PAIN LEVEL 4 - 6   





     





     





     


 


Levetiracetam  500 mg  09/10/19 17:00  09/11/19 09:38





  Keppra Injection -  IVPB   500 mg





  BID [facility]   Administration





     





     





     





     


 


Lorazepam  0.25 mg  09/07/19 09:05  09/11/19 03:54





  Ativan Injection -  IVPUSH   0.25 mg





  Q6H PRN   Administration





  ANXIETY   





     





     





     


 


Lorazepam  0.25 mg  09/10/19 23:00  09/10/19 23:01





  Ativan Injection -  IVPUSH   Not Given





  DAILY@2300 [facility]   





     





     





     





     


 


Metoclopramide HCl  10 mg  09/01/19 18:55  09/04/19 03:22





  Reglan Injection -  IVPUSH   10 mg





  Q6H PRN   Administration





  NAUSEA AND/OR VOMITING   





     





     





     


 


Morphine Sulfate  1 mg  09/08/19 13:06  09/11/19 09:38





  Morphine Sulfate  IVPUSH   1 mg





  Q3H PRN   Administration





  PAIN LEVEL 6-10   





     





     





     


 


Nystatin  1 applic  09/07/19 10:00  09/10/19 21:44





  Nystop Powder -  TP   1 applic





  BID [facility]   Administration





     





     





     





     


 


Ondansetron HCl  4 mg  09/01/19 18:55  09/03/19 23:36





  Zofran Injection  IVPUSH   4 mg





  Q6H PRN   Administration





  NAUSEA   





     





     





     


 


Pantoprazole Sodium  40 mg  09/10/19 17:00  09/11/19 09:39





  Protonix Iv  IVPUSH   40 mg





  DAILY [facility]   Administration





     





     





     





     


 


Scopolamine HBr  1 patch  09/04/19 23:00  09/07/19 23:48





  Transderm-Scop -  TD   1 patch





  Q72H [facility]   Administration





     





     





     





     











Impression


1. hypernatremia


2. hyperkalemia - resolved


3. SBO


4. epilepsy


5. developemental delay


6. hypokalemia





Plan


- cont current fluids


- monitor lytes


- check phos


- possible [place], per family


- surgery follow up

## 2019-09-12 VITALS — HEART RATE: 128 BPM | TEMPERATURE: 97.4 F | SYSTOLIC BLOOD PRESSURE: 140 MMHG | DIASTOLIC BLOOD PRESSURE: 87 MMHG

## 2019-09-12 LAB
ALBUMIN SERPL-MCNC: 1.8 G/DL (ref 3.4–5)
ALP SERPL-CCNC: 134 U/L (ref 45–117)
ALT SERPL-CCNC: 7 U/L (ref 13–61)
ANION GAP SERPL CALC-SCNC: 7 MMOL/L (ref 8–16)
AST SERPL-CCNC: 22 U/L (ref 15–37)
BASOPHILS # BLD: 0.3 % (ref 0–2)
BILIRUB SERPL-MCNC: 0.6 MG/DL (ref 0.2–1)
BUN SERPL-MCNC: 14.2 MG/DL (ref 7–18)
CALCIUM SERPL-MCNC: 8.2 MG/DL (ref 8.5–10.1)
CHLORIDE SERPL-SCNC: 102 MMOL/L (ref 98–107)
CO2 SERPL-SCNC: 29 MMOL/L (ref 21–32)
CREAT SERPL-MCNC: < 0.2 MG/DL (ref 0.55–1.3)
DEPRECATED RDW RBC AUTO: 15.2 % (ref 11.6–15.6)
EOSINOPHIL # BLD: 0 % (ref 0–4.5)
GLUCOSE SERPL-MCNC: 121 MG/DL (ref 74–106)
HCT VFR BLD CALC: 27 % (ref 32.4–45.2)
HGB BLD-MCNC: 9 GM/DL (ref 10.7–15.3)
LYMPHOCYTES # BLD: 5 % (ref 8–40)
MAGNESIUM SERPL-MCNC: 1.8 MG/DL (ref 1.8–2.4)
MCH RBC QN AUTO: 28.1 PG (ref 25.7–33.7)
MCHC RBC AUTO-ENTMCNC: 33.4 G/DL (ref 32–36)
MCV RBC: 84 FL (ref 80–96)
MONOCYTES # BLD AUTO: 5.8 % (ref 3.8–10.2)
NEUTROPHILS # BLD: 88.9 % (ref 42.8–82.8)
PHOSPHATE SERPL-MCNC: 1.5 MG/DL (ref 2.5–4.9)
PLATELET # BLD AUTO: 272 K/MM3 (ref 134–434)
PMV BLD: 8.8 FL (ref 7.5–11.1)
POTASSIUM SERPLBLD-SCNC: 3.9 MMOL/L (ref 3.5–5.1)
PROT SERPL-MCNC: 5 G/DL (ref 6.4–8.2)
RBC # BLD AUTO: 3.22 M/MM3 (ref 3.6–5.2)
SODIUM SERPL-SCNC: 138 MMOL/L (ref 136–145)
WBC # BLD AUTO: 14.4 K/MM3 (ref 4–10)

## 2019-09-12 PROCEDURE — 5A12012 PERFORMANCE OF CARDIAC OUTPUT, SINGLE, MANUAL: ICD-10-PCS | Performed by: INTERNAL MEDICINE

## 2019-09-12 RX ADMIN — IPRATROPIUM BROMIDE AND ALBUTEROL SULFATE SCH: .5; 3 SOLUTION RESPIRATORY (INHALATION) at 08:03

## 2019-09-12 RX ADMIN — LEVETIRACETAM SCH MG: 100 INJECTION, SOLUTION, CONCENTRATE INTRAVENOUS at 10:25

## 2019-09-12 RX ADMIN — LORAZEPAM PRN MG: 2 INJECTION INTRAMUSCULAR; INTRAVENOUS at 01:24

## 2019-09-12 RX ADMIN — LEVALBUTEROL HYDROCHLORIDE PRN MG: 0.31 SOLUTION RESPIRATORY (INHALATION) at 16:42

## 2019-09-12 RX ADMIN — PIPERACILLIN SODIUM,TAZOBACTAM SODIUM SCH MLS/HR: 3; .375 INJECTION, POWDER, FOR SOLUTION INTRAVENOUS at 02:45

## 2019-09-12 RX ADMIN — PIPERACILLIN SODIUM,TAZOBACTAM SODIUM SCH MLS/HR: 3; .375 INJECTION, POWDER, FOR SOLUTION INTRAVENOUS at 11:00

## 2019-09-12 RX ADMIN — MORPHINE SULFATE PRN MG: 2 INJECTION, SOLUTION INTRAMUSCULAR; INTRAVENOUS at 07:53

## 2019-09-12 RX ADMIN — HEPARIN SODIUM SCH UNIT: 5000 INJECTION, SOLUTION INTRAVENOUS; SUBCUTANEOUS at 10:25

## 2019-09-12 RX ADMIN — NYSTATIN SCH APPLIC: 100000 POWDER TOPICAL at 10:25

## 2019-09-12 RX ADMIN — LEVALBUTEROL HYDROCHLORIDE PRN MG: 0.31 SOLUTION RESPIRATORY (INHALATION) at 13:02

## 2019-09-12 RX ADMIN — PANTOPRAZOLE SODIUM SCH MG: 40 INJECTION, POWDER, FOR SOLUTION INTRAVENOUS at 10:25

## 2019-09-12 RX ADMIN — POTASSIUM CHLORIDE SCH: 149 INJECTION, SOLUTION, CONCENTRATE INTRAVENOUS at 13:40

## 2019-09-12 RX ADMIN — POTASSIUM CHLORIDE SCH: 149 INJECTION, SOLUTION, CONCENTRATE INTRAVENOUS at 01:28

## 2019-09-12 RX ADMIN — IPRATROPIUM BROMIDE AND ALBUTEROL SULFATE SCH: .5; 3 SOLUTION RESPIRATORY (INHALATION) at 04:11

## 2019-09-12 RX ADMIN — KETOROLAC TROMETHAMINE PRN MG: 30 INJECTION INTRAMUSCULAR; INTRAVENOUS at 03:35

## 2019-09-12 NOTE — PN
Progress Note (short form)





- Note


Progress Note: 





PULMONARY





Tachypneic, uncomfortable. Family at bedside.





 Vital Signs











 Period  Temp  Pulse  Resp  BP Sys/Carlson  Pulse Ox


 


 Last 24 Hr  98.4 F-99.8 F  135-147  18-19  112-157/71-87  100








 Intake & Output











 09/09/19 09/10/19 09/11/19 09/12/19





 23:59 23:59 23:59 23:59


 


Intake Total 2703 450 1180 


 


Output Total   100 


 


Balance 2703 450 1080 








Gen:  tachypneic, accessory muscle use


Heart: tachycardic, regular


Lung: bilateral rhonchi


Abd: distended


Ext: no edema





 CBC, BMP





 09/12/19 05:15 





 09/12/19 05:15 





Active Medications





Acetaminophen (Ofirmev Injection -)  500 mg IVPB Q6H PRN


   PRN Reason: PAIN OR FEVER


   Last Admin: 09/10/19 18:10 Dose:  500 mg


Heparin Sodium (Porcine) (Heparin -)  5,000 unit SQ BID [facility]


   Last Admin: 09/12/19 10:25 Dose:  5,000 unit


Metronidazole (Flagyl 500mg Premixed Ivpb -)  500 mg in 100 mls @ 100 mls/hr 

IVPB Q8H [facility]


   Last Admin: 09/12/19 12:13 Dose:  100 mls/hr


Piperacillin Sod/Tazobactam (Sod 3.375 gm/ Dextrose)  50 mls @ 100 mls/hr IVPB 

Q8H [facility]; Protocol


   Last Admin: 09/12/19 11:00 Dose:  100 mls/hr


Fat Emulsion Intravenous (Intralipid -)  250 mls @ 20.833 mls/hr IV Q2D@2200 [facility]


   Last Admin: 09/11/19 23:20 Dose:  20.833 mls/hr


Potassium Chloride 20 meq/ (Amino Acids)  1,010 mls @ 50 mls/hr IVPB Q12H [facility]


   Last Admin: 09/12/19 13:40 Dose:  Not Given


Ketorolac Tromethamine (Toradol Injection -)  15 mg IVPUSH Q6H PRN


   PRN Reason: PAIN LEVEL 4 - 6


   Stop: 09/14/19 19:41


   Last Admin: 09/12/19 03:35 Dose:  15 mg


Levalbuterol HCl (Xopenex)  0.31 mg IH Q8H PRN


   PRN Reason: ASTHMA


   Last Admin: 09/12/19 13:02 Dose:  0.31 mg


Levetiracetam (Keppra Injection -)  500 mg IVPB BID [facility]


   Last Admin: 09/12/19 10:25 Dose:  500 mg


Lorazepam (Ativan Injection -)  0.25 mg IVPUSH Q6H PRN


   PRN Reason: ANXIETY


   Last Admin: 09/12/19 01:24 Dose:  0.25 mg


Lorazepam (Ativan Injection -)  0.25 mg IVPUSH DAILY@2300 [facility]


   Last Admin: 09/11/19 23:16 Dose:  Not Given


Metoclopramide HCl (Reglan Injection -)  10 mg IVPUSH Q6H PRN


   PRN Reason: NAUSEA AND/OR VOMITING


   Last Admin: 09/04/19 03:22 Dose:  10 mg


Morphine Sulfate (Morphine Sulfate)  2 mg IVPUSH Q3H PRN


   PRN Reason: PAIN LEVEL 6-10


Nystatin (Nystop Powder -)  1 applic TP BID [facility]


   Last Admin: 09/12/19 10:25 Dose:  1 applic


Ondansetron HCl (Zofran Injection)  4 mg IVPUSH Q6H PRN


   PRN Reason: NAUSEA


   Last Admin: 09/03/19 23:36 Dose:  4 mg


Pantoprazole Sodium (Protonix Iv)  40 mg IVPUSH DAILY [facility]


   Last Admin: 09/12/19 10:25 Dose:  40 mg


Scopolamine HBr (Transderm-Scop -)  1 patch TD Q72H [facility]


   Last Admin: 09/07/19 23:48 Dose:  1 patch





A/P


SBO


Acute Respiratory Distress


Atelectasis


Scoliosis/Restrictive Lung Disease


Cerebral Palsy


Seizure Disorder





-  discussed with family at bedside, discussed how uncomfortable pt is and 

struggling as she is likely at the end of her life


-  agreeable to increasing morphine dose and frequency and titrating to her 

comfort


-  would readdress advanced directives

## 2019-09-12 NOTE — PN
Progress Note, Physician


Chief Complaint: 








patient non-verbal. Family at bedside and states patient appears to be in pain 

and not as responsive  to them as usual


History of Present Illness: 











History of Present Illness: 





Patient is a 59 year old female (resident of [facility]) with a PMHx 

significant for profound mental retardation, cerebral palsy, congenital 

quadriplegia, severe scoliosis, seizure disorder, and GERD.  Transferred from 

[place] to [facility] for further management of SBO, sepsis and tachycardia.  





- Current Medication List


Current Medications: 


Active Medications





Acetaminophen (Ofirmev Injection -)  500 mg IVPB Q6H PRN


   PRN Reason: PAIN OR FEVER


   Last Admin: 09/10/19 18:10 Dose:  500 mg


Albuterol/Ipratropium (Duoneb -)  1 amp NEB RQ4H [facility]


   Last Admin: 09/12/19 04:11 Dose:  Not Given


Heparin Sodium (Porcine) (Heparin -)  5,000 unit SQ BID [facility]


   Last Admin: 09/11/19 23:19 Dose:  5,000 unit


Metronidazole (Flagyl 500mg Premixed Ivpb -)  500 mg in 100 mls @ 100 mls/hr 

IVPB Q8H [facility]


   Last Admin: 09/12/19 03:35 Dose:  100 mls/hr


Piperacillin Sod/Tazobactam (Sod 3.375 gm/ Dextrose)  50 mls @ 100 mls/hr IVPB 

Q8H [facility]; Protocol


   Last Admin: 09/12/19 02:45 Dose:  100 mls/hr


Fat Emulsion Intravenous (Intralipid -)  250 mls @ 20.833 mls/hr IV Q2D@2200 [facility]


   Last Admin: 09/11/19 23:20 Dose:  20.833 mls/hr


Potassium Chloride 20 meq/ (Amino Acids)  1,010 mls @ 50 mls/hr IVPB Q12H [facility]


   Last Admin: 09/12/19 01:28 Dose:  Not Given


Ketorolac Tromethamine (Toradol Injection -)  15 mg IVPUSH Q6H PRN


   PRN Reason: PAIN LEVEL 4 - 6


   Stop: 09/14/19 19:41


   Last Admin: 09/12/19 03:35 Dose:  15 mg


Levetiracetam (Keppra Injection -)  500 mg IVPB BID [facility]


   Last Admin: 09/11/19 23:20 Dose:  500 mg


Lorazepam (Ativan Injection -)  0.25 mg IVPUSH Q6H PRN


   PRN Reason: ANXIETY


   Last Admin: 09/12/19 01:24 Dose:  0.25 mg


Lorazepam (Ativan Injection -)  0.25 mg IVPUSH DAILY@2300 [facility]


   Last Admin: 09/11/19 23:16 Dose:  Not Given


Metoclopramide HCl (Reglan Injection -)  10 mg IVPUSH Q6H PRN


   PRN Reason: NAUSEA AND/OR VOMITING


   Last Admin: 09/04/19 03:22 Dose:  10 mg


Morphine Sulfate (Morphine Sulfate)  1 mg IVPUSH Q3H PRN


   PRN Reason: PAIN LEVEL 6-10


   Last Admin: 09/11/19 17:35 Dose:  1 mg


Nystatin (Nystop Powder -)  1 applic TP BID [facility]


   Last Admin: 09/11/19 23:21 Dose:  1 applic


Ondansetron HCl (Zofran Injection)  4 mg IVPUSH Q6H PRN


   PRN Reason: NAUSEA


   Last Admin: 09/03/19 23:36 Dose:  4 mg


Pantoprazole Sodium (Protonix Iv)  40 mg IVPUSH DAILY [facility]


   Last Admin: 09/11/19 09:39 Dose:  40 mg


Scopolamine HBr (Transderm-Scop -)  1 patch TD Q72H [facility]


   Last Admin: 09/07/19 23:48 Dose:  1 patch











- Objective


Vital Signs: 


 Vital Signs











Temperature  98.4 F   09/12/19 06:00


 


Pulse Rate  135 H  09/12/19 06:00


 


Respiratory Rate  19 09/12/19 06:00


 


Blood Pressure  112/71   09/12/19 06:00


 


O2 Sat by Pulse Oximetry (%)  100   09/11/19 21:00











Additional Findings/Remarks: 





Constitutional: Yes: Calm


Eyes: Yes: WNL


HENT: Yes: Atraumatic


Neck: Yes: Supple


Cardiovascular: Yes: Tachycardia


Respiratory: Yes: Rhonchi


Gastrointestinal: Yes: Other (NGT to low intermittent suction.)


Extremities: Yes: Deformity. Severe contracture to LE


Edema: Yes


Edema: LLE: 1+, RLE: 1+


Integumentary: Yes: healed pressure to right trocanter


Neurological: Yes: Alert


Psychiatric: Yes: Alert


Labs: 


 CBC, BMP





 09/12/19 05:15 





 09/12/19 05:15 





 INR, PTT











INR  1.00  (0.82-1.09)   08/22/19  17:22    














Problem List





- Problems


(1) SBO (small bowel obstruction)


Assessment/Plan: 


NGT to low intermittent suction, still draining bilious output 


Strictly NPO.


no improvement in abdominal xrays, passing stool and flatus 


c/w clinimix


Surgery following, not a surgical candidate, high for for surgical complication 

and or death. 





Code(s): K56.609 - UNSP INTESTNL OBST, UNSP AS TO PARTIAL VERSUS COMPLETE OBST 

  





(2) Hypokalemia


Assessment/Plan: 


K 3.9, maintaining levels


continue to monitor K, maintain >4.0


c/w clinimix


Code(s): E87.6 - HYPOKALEMIA   





(3) Prophylactic measure


Assessment/Plan: 


FEN


NPO


c/w Climinix 


monitor electrolytes





Dispo 


maintain on tele


full code as of this time. Palliative care involved and issue of DNR/DNI  

discussed with family.


discharge planning








DVT


heparin sq bid


Code(s): Z29.9 - ENCOUNTER FOR PROPHYLACTIC MEASURES, UNSPECIFIED   





(4) Seizure


Assessment/Plan: 


c/w keppra 


on tegretol in [place], continue keppra iv bid.  


Code(s): R56.9 - UNSPECIFIED CONVULSIONS   





(5) Sepsis


Assessment/Plan: 


wbc  14.4


Mild effusions with left persistent pulmonary infiltrate on CXR


continue to monitor respiratory status.  


c/w 2 L NC


all cultures NGTD


ID following, cont IV abx (flagyl & zozyn) 


monitor output & temp


Code(s): A41.9 - SEPSIS, UNSPECIFIED ORGANISM   





(6) Aspiration into airway


Assessment/Plan: 


c/w zosyn & flagyl


support with 2 liters of nasal cannula


strictly npo


monitor oxygen levels and titrate off oxygen as tolerated.  not home oxygen 

dependent at home. 


Code(s): T17.908A - UNSP FB IN RESP TRACT, PART UNSP CAUSING OTH INJURY, INIT   





(7) Cerebral palsy


Code(s): G80.9 - CEREBRAL PALSY, UNSPECIFIED   





(8) Tachycardia


Assessment/Plan: 


HR remains in 120s despite increased frequency of MSO4, periods when HR is in 

80s


c/w MSO4 1.0mg IV q 6h


most likely related to SBO


 


Code(s): R00.0 - TACHYCARDIA, UNSPECIFIED   





(9) Hyponatremia


Assessment/Plan: 


Na to 138


c/w clinimax 


Code(s): E87.1 - HYPO-OSMOLALITY AND HYPONATREMIA   





(10) Palliative care status


Assessment/Plan: 


palliative care notes reviewed and noted that if patient is to be made a dnr, 

there are forms that need to be filled out and special criteria listed.  


There are 9 siblings as well as MHLS that are to be involved.  


Appreciate Palliative care nurse care


patient is a Full Code.


Code(s): Z51.5 - ENCOUNTER FOR PALLIATIVE CARE   





(11) Decreased oral intake


Assessment/Plan: 


 strictly NPO.  


on clinimax daily and lipids every other day


dietary following  





Code(s): R63.8 - OTHER SYMPTOMS AND SIGNS CONCERNING FOOD AND FLUID INTAKE   





Visit type





- Emergency Visit


Emergency Visit: Yes


ED Registration Date: 08/22/19


Care time: The patient presented to the Emergency Department on the above date 

and was hospitalized for further evaluation of their emergent condition.





- New Patient


This patient is new to me today: No





- Critical Care


Critical Care patient: No





- Discharge Referral


Referred to [facility] No

## 2019-09-12 NOTE — PN
Progress Note, Physician


History of Present Illness: 





Pt seen and examined at bedside. She had a small bowel movement. 





- Current Medication List


Current Medications: 


Active Medications





Acetaminophen (Ofirmev Injection -)  500 mg IVPB Q6H PRN


   PRN Reason: PAIN OR FEVER


   Last Admin: 09/10/19 18:10 Dose:  500 mg


Heparin Sodium (Porcine) (Heparin -)  5,000 unit SQ BID [facility]


   Last Admin: 09/12/19 10:25 Dose:  5,000 unit


Metronidazole (Flagyl 500mg Premixed Ivpb -)  500 mg in 100 mls @ 100 mls/hr 

IVPB Q8H [facility]


   Last Admin: 09/12/19 12:13 Dose:  100 mls/hr


Piperacillin Sod/Tazobactam (Sod 3.375 gm/ Dextrose)  50 mls @ 100 mls/hr IVPB 

Q8H [facility]; Protocol


   Last Admin: 09/12/19 11:00 Dose:  100 mls/hr


Fat Emulsion Intravenous (Intralipid -)  250 mls @ 20.833 mls/hr IV Q2D@2200 [facility]


   Last Admin: 09/11/19 23:20 Dose:  20.833 mls/hr


Potassium Chloride 20 meq/ (Amino Acids)  1,010 mls @ 50 mls/hr IVPB Q12H [facility]


   Last Admin: 09/12/19 13:40 Dose:  Not Given


Ketorolac Tromethamine (Toradol Injection -)  15 mg IVPUSH Q6H PRN


   PRN Reason: PAIN LEVEL 4 - 6


   Stop: 09/14/19 19:41


   Last Admin: 09/12/19 03:35 Dose:  15 mg


Levalbuterol HCl (Xopenex)  0.31 mg IH Q8H PRN


   PRN Reason: ASTHMA


   Last Admin: 09/12/19 13:02 Dose:  0.31 mg


Levetiracetam (Keppra Injection -)  500 mg IVPB BID [facility]


   Last Admin: 09/12/19 10:25 Dose:  500 mg


Lorazepam (Ativan Injection -)  0.25 mg IVPUSH Q6H PRN


   PRN Reason: ANXIETY


   Last Admin: 09/12/19 01:24 Dose:  0.25 mg


Lorazepam (Ativan Injection -)  0.25 mg IVPUSH DAILY@2300 [facility]


   Last Admin: 09/11/19 23:16 Dose:  Not Given


Metoclopramide HCl (Reglan Injection -)  10 mg IVPUSH Q6H PRN


   PRN Reason: NAUSEA AND/OR VOMITING


   Last Admin: 09/04/19 03:22 Dose:  10 mg


Morphine Sulfate (Morphine Sulfate)  2 mg IVPUSH Q2H PRN


   PRN Reason: PAIN LEVEL 6-10


Nystatin (Nystop Powder -)  1 applic TP BID [facility]


   Last Admin: 09/12/19 10:25 Dose:  1 applic


Ondansetron HCl (Zofran Injection)  4 mg IVPUSH Q6H PRN


   PRN Reason: NAUSEA


   Last Admin: 09/03/19 23:36 Dose:  4 mg


Pantoprazole Sodium (Protonix Iv)  40 mg IVPUSH DAILY [facility]


   Last Admin: 09/12/19 10:25 Dose:  40 mg


Scopolamine HBr (Transderm-Scop -)  1 patch TD Q72H [facility]


   Last Admin: 09/07/19 23:48 Dose:  1 patch











- Objective


Vital Signs: 


 Vital Signs











Temperature  97.2 F L  09/12/19 14:10


 


Pulse Rate  129 H  09/12/19 14:10


 


Respiratory Rate  22 H  09/12/19 14:10


 


Blood Pressure  136/82   09/12/19 14:10


 


O2 Sat by Pulse Oximetry (%)  95   09/12/19 10:00











Constitutional: Yes: Calm


Eyes: Yes: Conjunctiva Clear


HENT: Yes: Atraumatic


Neck: Yes: Supple


Cardiovascular: Yes: S1, S2


Respiratory: Yes: On Venti-Mask


Gastrointestinal: Yes: Soft


Genitourinary: Yes: Incontinence


Musculoskeletal: Yes: Muscle Weakness


Edema: No


Integumentary: Yes: WNL


Neurological: Yes: Pre-Existing Deficit


Labs: 


 CBC, BMP





 09/12/19 05:15 





 09/12/19 05:15 





 INR, PTT











INR  1.00  (0.82-1.09)   08/22/19  17:22    














Problem List





- Problems


(1) Hyperkalemia


Code(s): E87.5 - HYPERKALEMIA   





(2) Hypernatremia


Code(s): E87.0 - HYPEROSMOLALITY AND HYPERNATREMIA   





(3) SBO (small bowel obstruction)


Code(s): K56.609 - UNSP INTESTNL OBST, UNSP AS TO PARTIAL VERSUS COMPLETE OBST 

  





(4) Seizure


Code(s): R56.9 - UNSPECIFIED CONVULSIONS   





Assessment/Plan


 Current Medications











Generic Name Dose Route Start Last Admin





  Trade Name Freq  PRN Reason Stop Dose Admin


 


Acetaminophen  500 mg  09/10/19 17:18  09/10/19 18:10





  Ofirmev Injection -  IVPB   500 mg





  Q6H PRN   Administration





  PAIN OR FEVER   





     





     





     


 


Heparin Sodium (Porcine)  5,000 unit  09/09/19 10:00  09/12/19 10:25





  Heparin -  SQ   5,000 unit





  BID ROSE MARIE   Administration





     





     





     





     


 


Metronidazole  500 mg in 100 mls @ 100 mls/hr  09/01/19 19:30  09/12/19 12:13





  Flagyl 500mg Premixed Ivpb -  IVPB   100 mls/hr





  Q8H ROSE MARIE   Administration





     





     





     





     


 


Piperacillin Sod/Tazobactam  50 mls @ 100 mls/hr  09/01/19 19:30  09/12/19 11:00





  Sod 3.375 gm/ Dextrose  IVPB   100 mls/hr





  Q8H ROSE MARIE   Administration





     





     





  Protocol   





     


 


Fat Emulsion Intravenous  250 mls @ 20.833 mls/hr  09/05/19 22:00  09/11/19 23:

20





  Intralipid -  IV   20.833 mls/hr





  Q2D@2200 ROSE MARIE   Administration





     





     





     





     


 


Potassium Chloride 20 meq/  1,010 mls @ 50 mls/hr  09/10/19 00:27  09/12/19 13:

40





  Amino Acids  IVPB   Not Given





  Q12H [facility]   





     





     





     





     


 


Ketorolac Tromethamine  15 mg  09/09/19 19:42  09/12/19 03:35





  Toradol Injection -  IVPUSH  09/14/19 19:41  15 mg





  Q6H PRN   Administration





  PAIN LEVEL 4 - 6   





     





     





     


 


Levalbuterol HCl  0.31 mg  09/12/19 10:08  09/12/19 13:02





  Xopenex  IH   0.31 mg





  Q8H PRN   Administration





  ASTHMA   





     





     





     


 


Levetiracetam  500 mg  09/10/19 17:00  09/12/19 10:25





  Keppra Injection -  IVPB   500 mg





  BID ROSE MARIE   Administration





     





     





     





     


 


Lorazepam  0.25 mg  09/07/19 09:05  09/12/19 01:24





  Ativan Injection -  IVPUSH   0.25 mg





  Q6H PRN   Administration





  ANXIETY   





     





     





     


 


Lorazepam  0.25 mg  09/10/19 23:00  09/11/19 23:16





  Ativan Injection -  IVPUSH   Not Given





  DAILY@2300 ROSE MARIE   





     





     





     





     


 


Metoclopramide HCl  10 mg  09/01/19 18:55  09/04/19 03:22





  Reglan Injection -  IVPUSH   10 mg





  Q6H PRN   Administration





  NAUSEA AND/OR VOMITING   





     





     





     


 


Morphine Sulfate  2 mg  09/12/19 13:48  





  Morphine Sulfate  IVPUSH   





  Q2H PRN   





  PAIN LEVEL 6-10   





     





     





     


 


Nystatin  1 applic  09/07/19 10:00  09/12/19 10:25





  Nystop Powder -  TP   1 applic





  BID ROSE MARIE   Administration





     





     





     





     


 


Ondansetron HCl  4 mg  09/01/19 18:55  09/03/19 23:36





  Zofran Injection  IVPUSH   4 mg





  Q6H PRN   Administration





  NAUSEA   





     





     





     


 


Pantoprazole Sodium  40 mg  09/10/19 17:00  09/12/19 10:25





  Protonix Iv  IVPUSH   40 mg





  DAILY ROSE MARIE   Administration





     





     





     





     


 


Scopolamine HBr  1 patch  09/04/19 23:00  09/07/19 23:48





  Transderm-Scop -  TD   1 patch





  Q72H ROSE MARIE   Administration





     





     





     





     











Impression


1. hypernatremia


2. hyperkalemia - resolved


3. SBO


4. epilepsy


5. developemental delay


6. hypokalemia





Plan


- replace phos


- cont clinimix


- surgery follow up


- pt had bowel movement

## 2019-09-12 NOTE — PN
Progress Note (short form)





- Note


Progress Note: 


S: nonverbal


  Current Medications











Generic Name Dose Route Start Last Admin





  Trade Name Freq  PRN Reason Stop Dose Admin


 


Acetaminophen  500 mg  09/10/19 17:18  09/10/19 18:10





  Ofirmev Injection -  IVPB   500 mg





  Q6H PRN   Administration





  PAIN OR FEVER   





     





     





     


 


Heparin Sodium (Porcine)  5,000 unit  09/09/19 10:00  09/12/19 10:25





  Heparin -  SQ   5,000 unit





  BID ROSE MARIE   Administration





     





     





     





     


 


Metronidazole  500 mg in 100 mls @ 100 mls/hr  09/01/19 19:30  09/12/19 03:35





  Flagyl 500mg Premixed Ivpb -  IVPB   100 mls/hr





  Q8H ROSE MARIE   Administration





     





     





     





     


 


Piperacillin Sod/Tazobactam  50 mls @ 100 mls/hr  09/01/19 19:30  09/12/19 02:45





  Sod 3.375 gm/ Dextrose  IVPB   100 mls/hr





  Q8H ROSE MARIE   Administration





     





     





  Protocol   





     


 


Fat Emulsion Intravenous  250 mls @ 20.833 mls/hr  09/05/19 22:00  09/11/19 23:

20





  Intralipid -  IV   20.833 mls/hr





  Q2D@2200 ROSE MARIE   Administration





     





     





     





     


 


Potassium Chloride 20 meq/  1,010 mls @ 50 mls/hr  09/10/19 00:27  09/12/19 01:

28





  Amino Acids  IVPB   Not Given





  Q12H [facility]   





     





     





     





     


 


Ketorolac Tromethamine  15 mg  09/09/19 19:42  09/12/19 03:35





  Toradol Injection -  IVPUSH  09/14/19 19:41  15 mg





  Q6H PRN   Administration





  PAIN LEVEL 4 - 6   





     





     





     


 


Levalbuterol HCl  0.31 mg  09/12/19 10:08  





  Xopenex  IH   





  Q8H PRN   





  ASTHMA   





     





     





     


 


Levetiracetam  500 mg  09/10/19 17:00  09/12/19 10:25





  Keppra Injection -  IVPB   500 mg





  BID ROSE MARIE   Administration





     





     





     





     


 


Lorazepam  0.25 mg  09/07/19 09:05  09/12/19 01:24





  Ativan Injection -  IVPUSH   0.25 mg





  Q6H PRN   Administration





  ANXIETY   





     





     





     


 


Lorazepam  0.25 mg  09/10/19 23:00  09/11/19 23:16





  Ativan Injection -  IVPUSH   Not Given





  DAILY@2300 [facility]   





     





     





     





     


 


Metoclopramide HCl  10 mg  09/01/19 18:55  09/04/19 03:22





  Reglan Injection -  IVPUSH   10 mg





  Q6H PRN   Administration





  NAUSEA AND/OR VOMITING   





     





     





     


 


Morphine Sulfate  1 mg  09/08/19 13:06  09/12/19 07:53





  Morphine Sulfate  IVPUSH   1 mg





  Q3H PRN   Administration





  PAIN LEVEL 6-10   





     





     





     


 


Nystatin  1 applic  09/07/19 10:00  09/12/19 10:25





  Nystop Powder -  TP   1 applic





  BID ROSE MARIE   Administration





     





     





     





     


 


Ondansetron HCl  4 mg  09/01/19 18:55  09/03/19 23:36





  Zofran Injection  IVPUSH   4 mg





  Q6H PRN   Administration





  NAUSEA   





     





     





     


 


Pantoprazole Sodium  40 mg  09/10/19 17:00  09/12/19 10:25





  Protonix Iv  IVPUSH   40 mg





  DAILY ROSE MARIE   Administration





     





     





     





     


 


Scopolamine HBr  1 patch  09/04/19 23:00  09/07/19 23:48





  Transderm-Scop -  TD   1 patch





  Q72H ROSE MARIE   Administration





     





     





     





     








 Vital Signs











 Period  Temp  Pulse  Resp  BP Sys/Carlson  Pulse Ox


 


 Last 24 Hr  98.4 F-99.8 F  135-147  18-19  112-157/71-87  100











Gen: NAD


Cardiovascular: Yes: Regular Rate and Rhythm


Respiratory: Yes: Rhonchi


Gastrointestinal: Yes: Soft


Edema: No


no jaundice diaphoresis


not agitated





- ....Imaging


EKG: Image Reviewed





  CBC, BMP





 09/12/19 05:15 





 09/12/19 05:15 

















Assessment/Plan











echo 8/2019  nl LV function, E/A reversal, RV not well visualized, mild MR





IMP:


Sinus tachycardia:


- echo unremarkable


- likely in setting of underlying pain, anxiety, SBO


- trop neg, no ischemia on EKG - unlikely ACS





SBO:


- manage per surgery, ngt remains in





Seizure disorder:


- manage per primary

## 2019-09-13 NOTE — RAPID
Physical Examination


Vital Signs: 


 Vital Signs











Temperature  97.4 F L  09/12/19 18:00


 


Pulse Rate  128 H  09/12/19 18:00


 


Respiratory Rate  22 H  09/12/19 18:00


 


Blood Pressure  140/87   09/12/19 18:00


 


O2 Sat by Pulse Oximetry (%)  95   09/12/19 10:00











Findings/Remarks: 


Code 99 was called overhead at 7:55PM. On call team arrived to find the patient 

non-responsive, not breathing, and pulseless. 


CPR was intiated as per ACLS protocol. Please refer to code sheet for further 

details.





Time of death was called at 8:19PM 





On examination:





Eyes: Pupils were fixed and non-responsive to light


Resp: No respiratory effort


CVS: No heart sounds, no pulses


CNS: No response to tactile stimuli





Family was at the bedside, and emotional support was provided.































































































Labs: 


 CBC, BMP





 09/12/19 05:15 





 09/12/19 05:15

## 2020-02-10 NOTE — HOSP
Subjective





- Review of Symptoms


Events since last encounter: 





59 year-old female resident of [facility], with a PMH significant for 

profound mental retardation, cerebral palsy, congenital quadriplegia, severe 

scoliosis, seizure disorder, and GERD. Aides have observed patient becoming 

progressively constipated after starting famotidine for GERD. 








Received pt from [facility] currently being treated for SBO


   -- abdominal xray series 


   -- Zosyn IV q 8 hours 


   --NPO


   --IV fluids


   --NGT in place, connected to low wall suction





Upon arrival family stated patient was tachy in EMS, vitals checked  [facility]


followed up with EKG 


EKG: shows sinus tachy, T wave abnormality, ? consider anterolateral ischemia 


last EKG compare to June, 2019 sinus tachy, septal infarct 


Upon exam no acute pain, grimacing noted 


will follow up with cardiac profile


if enzymes,trops elevated  will consider tele and cardiology follow up 


patient was also on morphine for pain, will start morphine 0.5 mg q 8 hours PRN 





 








Physical Examination


Vital Signs: 


 Vital Signs











Temperature  98.8 F   08/23/19 19:01


 


Pulse Rate  130 H  08/23/19 19:01


 


Respiratory Rate  18   08/23/19 17:49


 


Blood Pressure  138/92   08/23/19 19:01


 


O2 Sat by Pulse Oximetry (%)  98   08/23/19 08:50











Constitutional: Yes: Cachectic


Eyes: Yes: WNL


HENT: Yes: Atraumatic, Normocephalic


Neck: Yes: Supple


Cardiovascular: Yes: Tachycardia


Respiratory: Yes: Regular


Gastrointestinal: Yes: Soft, Other (+NGT, low wall suction)


Labs: 


 CBC, BMP





 08/22/19 17:22 





 08/22/19 17:22 Constitutional: Well appearing NAD non toxic.  Head: NCAT   ENMT: PERRLA conjunctiva nml. No nasal discharge. MMM. No oropharyngeal erythema edema exudate lesions. L TM erythema.   Neck: supple, non tender, full ROM.   Cardiac: RRR no murmurs  Resp: CTA b/l.   Abd: s NT ND +BS.   Skin: no rash, abrasions, or lesions.  Ext: well perfused x4, moving all extremities, no edema. 2+ equal pulses throughout.